# Patient Record
Sex: FEMALE | Race: WHITE | Employment: FULL TIME | URBAN - METROPOLITAN AREA
[De-identification: names, ages, dates, MRNs, and addresses within clinical notes are randomized per-mention and may not be internally consistent; named-entity substitution may affect disease eponyms.]

---

## 2017-02-09 ENCOUNTER — OFFICE VISIT (OUTPATIENT)
Dept: FAMILY MEDICINE CLINIC | Age: 57
End: 2017-02-09

## 2017-02-09 VITALS
SYSTOLIC BLOOD PRESSURE: 97 MMHG | OXYGEN SATURATION: 99 % | HEART RATE: 61 BPM | DIASTOLIC BLOOD PRESSURE: 53 MMHG | BODY MASS INDEX: 28.71 KG/M2 | RESPIRATION RATE: 16 BRPM | WEIGHT: 156 LBS | TEMPERATURE: 97.8 F | HEIGHT: 62 IN

## 2017-02-09 DIAGNOSIS — Z00.00 PHYSICAL EXAM, ANNUAL: Primary | ICD-10-CM

## 2017-02-09 DIAGNOSIS — Z11.59 SCREENING FOR VIRAL DISEASE: ICD-10-CM

## 2017-02-09 DIAGNOSIS — G89.29 CHRONIC HIP PAIN, UNSPECIFIED LATERALITY: ICD-10-CM

## 2017-02-09 DIAGNOSIS — M25.559 CHRONIC HIP PAIN, UNSPECIFIED LATERALITY: ICD-10-CM

## 2017-02-09 RX ORDER — MELOXICAM 15 MG/1
TABLET ORAL
Qty: 30 TAB | Refills: 2 | Status: SHIPPED | OUTPATIENT
Start: 2017-02-09 | End: 2018-01-17

## 2017-02-09 NOTE — PROGRESS NOTES
HISTORY OF PRESENT ILLNESS  Valentino Walls is a 61 y.o. female. HPI Comments: Pt comes in today for CPE. She says that she has been seeing Ortho for her chronic hip pain and has been getting injections. She says that she will see them again next week. She says that she has been seeing GI and will see them again next week. She denies fever, chills, sweats, N/V, chest pain, SOB, dizziness. Physical   Pertinent negatives include no chest pain, no abdominal pain, no headaches and no shortness of breath. Review of Systems   Constitutional: Negative for chills, diaphoresis, fever and malaise/fatigue. HENT: Negative for congestion, ear pain, hearing loss, nosebleeds and sore throat. Eyes: Negative for blurred vision, double vision, pain and redness. Respiratory: Negative for cough, hemoptysis, sputum production, shortness of breath and wheezing. Cardiovascular: Negative for chest pain, palpitations and leg swelling. Gastrointestinal: Negative for abdominal pain, blood in stool, constipation, diarrhea, nausea and vomiting. Genitourinary: Negative for dysuria and hematuria. Musculoskeletal: Positive for joint pain. Negative for back pain, myalgias and neck pain. Skin: Negative for rash. Neurological: Negative for dizziness, tingling, sensory change, seizures, loss of consciousness and headaches. Endo/Heme/Allergies: Does not bruise/bleed easily. Psychiatric/Behavioral: Negative for depression, memory loss and substance abuse. The patient is not nervous/anxious. History reviewed. No pertinent past medical history. History reviewed. No pertinent family history. History reviewed. No pertinent past surgical history. Social History     Social History    Marital status:      Spouse name: N/A    Number of children: N/A    Years of education: N/A     Occupational History    Not on file.      Social History Main Topics    Smoking status: Former Smoker     Years: 10. 00     Types: Cigarettes     Quit date: 11/19/1995    Smokeless tobacco: Never Used    Alcohol use Yes      Comment: occassionally    Drug use: No    Sexual activity: Yes     Partners: Male     Other Topics Concern    Not on file     Social History Narrative       Current Outpatient Prescriptions   Medication Sig Dispense Refill    meloxicam (MOBIC) 15 mg tablet take 1 tablet by mouth once daily with food 30 Tab 2    levothyroxine (SYNTHROID) 75 mcg tablet Take 1 Tab by mouth Daily (before breakfast). 90 Tab 1    simvastatin (ZOCOR) 10 mg tablet Take 1 Tab by mouth nightly. 90 Tab 1    CHOLECALCIFEROL, VITAMIN D3, (VITAMIN D3 PO) Take  by mouth.  omega-3 fatty acids (FISH OIL) cap 3,000 mg.      Glucosamine &Chondroit-MV-Min3 815-097-15-0.5 mg tab Take  by mouth. No Known Allergies    Visit Vitals    BP 97/53 (BP 1 Location: Right arm, BP Patient Position: Sitting)    Pulse 61    Temp 97.8 °F (36.6 °C) (Oral)    Resp 16    Ht 5' 2\" (1.575 m)    Wt 156 lb (70.8 kg)    SpO2 99%    BMI 28.53 kg/m2       Physical Exam   Constitutional: She is oriented to person, place, and time. She appears well-developed and well-nourished. No distress. HENT:   Head: Normocephalic and atraumatic. Right Ear: External ear normal.   Left Ear: External ear normal.   Nose: Nose normal.   Mouth/Throat: Oropharynx is clear and moist. No oropharyngeal exudate. Eyes: Conjunctivae are normal. Pupils are equal, round, and reactive to light. Right eye exhibits no discharge. Left eye exhibits no discharge. Neck: Normal range of motion. Neck supple. No tracheal deviation present. No thyromegaly present. Cardiovascular: Normal rate, regular rhythm and normal heart sounds. Exam reveals no gallop and no friction rub. No murmur heard. Pulmonary/Chest: Effort normal and breath sounds normal. No respiratory distress. She has no wheezes. She has no rales. Abdominal: Soft.  Bowel sounds are normal. She exhibits no distension and no mass. There is no tenderness. There is no rebound and no guarding. Musculoskeletal: Normal range of motion. She exhibits no edema or tenderness. Lymphadenopathy:     She has no cervical adenopathy. Neurological: She is alert and oriented to person, place, and time. Coordination normal.   Skin: Skin is warm and dry. No rash noted. She is not diaphoretic. No erythema. Psychiatric: She has a normal mood and affect. Her behavior is normal. Judgment and thought content normal.       ASSESSMENT and PLAN  1. Physical exam, annual  - VITAMIN D, 25 HYDROXY; Future  - HEMOGLOBIN A1C WITH EAG; Future  - other blood work done in Dec 2016 and was normal    2. Chronic hip pain, unspecified laterality  - meloxicam (MOBIC) 15 mg tablet; take 1 tablet by mouth once daily with food  Dispense: 30 Tab; Refill: 2    3. Screening for viral disease  - HEPATITIS C AB; Future    - will call with results, follow up as needed  - pt up to date on health maintenance    Plan and reference materials were reviewed with the patient and the patient expressed understanding. Patient instructed that if symptoms/condition worsens or fails to resolve to come back to the office or go to the emergency room.     Danilo Quezada

## 2017-02-09 NOTE — PATIENT INSTRUCTIONS
Well Visit, Women 48 to 72: Care Instructions  Your Care Instructions  Physical exams can help you stay healthy. Your doctor has checked your overall health and may have suggested ways to take good care of yourself. He or she also may have recommended tests. At home, you can help prevent illness with healthy eating, regular exercise, and other steps. Follow-up care is a key part of your treatment and safety. Be sure to make and go to all appointments, and call your doctor if you are having problems. It's also a good idea to know your test results and keep a list of the medicines you take. How can you care for yourself at home? · Reach and stay at a healthy weight. This will lower your risk for many problems, such as obesity, diabetes, heart disease, and high blood pressure. · Get at least 30 minutes of exercise on most days of the week. Walking is a good choice. You also may want to do other activities, such as running, swimming, cycling, or playing tennis or team sports. · Do not smoke. Smoking can make health problems worse. If you need help quitting, talk to your doctor about stop-smoking programs and medicines. These can increase your chances of quitting for good. · Protect your skin from too much sun. When you're outdoors from 10 a.m. to 4 p.m., stay in the shade or cover up with clothing and a hat with a wide brim. Wear sunglasses that block UV rays. Even when it's cloudy, put broad-spectrum sunscreen (SPF 30 or higher) on any exposed skin. · See a dentist one or two times a year for checkups and to have your teeth cleaned. · Wear a seat belt in the car. · Limit alcohol to 1 drink a day. Too much alcohol can cause health problems. Follow your doctor's advice about when to have certain tests. These tests can spot problems early. · Cholesterol.  Your doctor will tell you how often to have this done based on your age, family history, or other things that can increase your risk for heart attack and stroke. · Blood pressure. Have your blood pressure checked during a routine doctor visit. Your doctor will tell you how often to check your blood pressure based on your age, your blood pressure results, and other factors. · Mammogram. Ask your doctor how often you should have a mammogram, which is an X-ray of your breasts. A mammogram can spot breast cancer before it can be felt and when it is easiest to treat. · Pap test and pelvic exam. Ask your doctor how often you should have a Pap test. You may not need to have a Pap test as often as you used to. · Vision. Have your eyes checked every year or two or as often as your doctor suggests. Some experts recommend that you have yearly exams for glaucoma and other age-related eye problems starting at age 48. · Hearing. Tell your doctor if you notice any change in your hearing. You can have tests to find out how well you hear. · Diabetes. Ask your doctor whether you should have tests for diabetes. · Colon cancer. You should begin tests for colon cancer at age 48. You may have one of several tests. Your doctor will tell you how often to have tests based on your age and risk. Risks include whether you already had a precancerous polyp removed from your colon or whether your parents, sisters and brothers, or children have had colon cancer. · Thyroid disease. Talk to your doctor about whether to have your thyroid checked as part of a regular physical exam. Women have an increased chance of a thyroid problem. · Osteoporosis. You should begin tests for bone density at age 72. If you are younger than 72, ask your doctor whether you have factors that may increase your risk for this disease. You may want to have this test before age 72. · Heart attack and stroke risk. At least every 4 to 6 years, you should have your risk for heart attack and stroke assessed.  Your doctor uses factors such as your age, blood pressure, cholesterol, and whether you smoke or have diabetes to show what your risk for a heart attack or stroke is over the next 10 years. When should you call for help? Watch closely for changes in your health, and be sure to contact your doctor if you have any problems or symptoms that concern you. Where can you learn more? Go to http://george-rajiv.info/. Enter D206 in the search box to learn more about \"Well Visit, Women 50 to 72: Care Instructions. \"  Current as of: July 19, 2016  Content Version: 11.1  © 6076-2984 [employer]. Care instructions adapted under license by [employer] (which disclaims liability or warranty for this information). If you have questions about a medical condition or this instruction, always ask your healthcare professional. [address] any warranty or liability for your use of this information.

## 2017-02-09 NOTE — MR AVS SNAPSHOT
Visit Information Date & Time Provider Department Dept. Phone Encounter #  
 2/9/2017  3:00 PM Reed Hooker, [address] [facility] [phone] [id number] Follow-up Instructions Return if symptoms worsen or fail to improve. Upcoming Health Maintenance Date Due DTaP/Tdap/Td series (1 - Tdap) 2/23/1980 PAP AKA CERVICAL CYTOLOGY 8/18/2018 BREAST CANCER SCRN MAMMOGRAM 12/14/2018 COLONOSCOPY 6/1/2019 Allergies as of 2/9/2017  Review Complete On: 2/9/2017 By: NAOMI Salmeron No Known Allergies Current Immunizations  Reviewed on 12/12/2016 Name Date Influenza Vaccine 11/20/2014 Influenza Vaccine (Quad) PF 12/12/2016 Zoster Vaccine, Live 11/20/2014 Not reviewed this visit You Were Diagnosed With   
  
 Codes Comments Physical exam, annual    -  Primary ICD-10-CM: Z00.00 ICD-9-CM: V70.0 Chronic hip pain, unspecified laterality     ICD-10-CM: M25.559, G89.29 ICD-9-CM: 719.45, 338.29 Vitals BP Pulse Temp Resp Height(growth percentile) Weight(growth percentile) 97/53 (BP 1 Location: Right arm, BP Patient Position: Sitting) 61 97.8 °F (36.6 °C) (Oral) 16 5' 2\" (1.575 m) 156 lb (70.8 kg) SpO2 BMI OB Status Smoking Status 99% 28.53 kg/m2 Postmenopausal Former Smoker BMI and BSA Data Body Mass Index Body Surface Area 28.53 kg/m 2 1.76 m 2 Preferred Pharmacy Pharmacy Name Phone [address] [phone] Your Updated Medication List  
  
   
This list is accurate as of: 2/9/17  4:02 PM.  Always use your most recent med list.  
  
  
  
  
 Payal Lime Generic drug:  omega-3 fatty acids 3,000 mg.  
  
 Glucosamine &Chondroit-MV-Min3 572-438-49-0.5 mg Tab Take  by mouth.  
  
 levothyroxine 75 mcg tablet Commonly known as:  SYNTHROID Take 1 Tab by mouth Daily (before breakfast). meloxicam 15 mg tablet Commonly known as:  MOBIC  
take 1 tablet by mouth once daily with food  
  
 simvastatin 10 mg tablet Commonly known as:  ZOCOR Take 1 Tab by mouth nightly. VITAMIN D3 PO Take  by mouth. Prescriptions Sent to Pharmacy Refills  
 meloxicam (MOBIC) 15 mg tablet 2 Sig: take 1 tablet by mouth once daily with food Class: Normal  
 Pharmacy: [address] [place] [address] #: [phone] Follow-up Instructions Return if symptoms worsen or fail to improve. To-Do List   
 02/09/2017 Lab:  HEMOGLOBIN A1C WITH EAG   
  
 02/09/2017 Lab:  VITAMIN D, 25 HYDROXY Patient Instructions Well Visit, Women 48 to 72: Care Instructions Your Care Instructions Physical exams can help you stay healthy. Your doctor has checked your overall health and may have suggested ways to take good care of yourself. He or she also may have recommended tests. At home, you can help prevent illness with healthy eating, regular exercise, and other steps. Follow-up care is a key part of your treatment and safety. Be sure to make and go to all appointments, and call your doctor if you are having problems. It's also a good idea to know your test results and keep a list of the medicines you take. How can you care for yourself at home? · Reach and stay at a healthy weight. This will lower your risk for many problems, such as obesity, diabetes, heart disease, and high blood pressure. · Get at least 30 minutes of exercise on most days of the week. Walking is a good choice. You also may want to do other activities, such as running, swimming, cycling, or playing tennis or team sports. · Do not smoke. Smoking can make health problems worse. If you need help quitting, talk to your doctor about stop-smoking programs and medicines. These can increase your chances of quitting for good. · Protect your skin from too much sun. When you're outdoors from 10 a.m. to 4 p.m., stay in the shade or cover up with clothing and a hat with a wide brim. Wear sunglasses that block UV rays. Even when it's cloudy, put broad-spectrum sunscreen (SPF 30 or higher) on any exposed skin. · See a dentist one or two times a year for checkups and to have your teeth cleaned. · Wear a seat belt in the car. · Limit alcohol to 1 drink a day. Too much alcohol can cause health problems. Follow your doctor's advice about when to have certain tests. These tests can spot problems early. · Cholesterol. Your doctor will tell you how often to have this done based on your age, family history, or other things that can increase your risk for heart attack and stroke. · Blood pressure. Have your blood pressure checked during a routine doctor visit. Your doctor will tell you how often to check your blood pressure based on your age, your blood pressure results, and other factors. · Mammogram. Ask your doctor how often you should have a mammogram, which is an X-ray of your breasts. A mammogram can spot breast cancer before it can be felt and when it is easiest to treat. · Pap test and pelvic exam. Ask your doctor how often you should have a Pap test. You may not need to have a Pap test as often as you used to. · Vision. Have your eyes checked every year or two or as often as your doctor suggests. Some experts recommend that you have yearly exams for glaucoma and other age-related eye problems starting at age 48. · Hearing. Tell your doctor if you notice any change in your hearing. You can have tests to find out how well you hear. · Diabetes. Ask your doctor whether you should have tests for diabetes. · Colon cancer. You should begin tests for colon cancer at age 48. You may have one of several tests. Your doctor will tell you how often to have tests based on your age and risk.  Risks include whether you already had a precancerous polyp removed from your colon or whether your parents, sisters and brothers, or children have had colon cancer. · Thyroid disease. Talk to your doctor about whether to have your thyroid checked as part of a regular physical exam. Women have an increased chance of a thyroid problem. · Osteoporosis. You should begin tests for bone density at age 72. If you are younger than 72, ask your doctor whether you have factors that may increase your risk for this disease. You may want to have this test before age 72. · Heart attack and stroke risk. At least every 4 to 6 years, you should have your risk for heart attack and stroke assessed. Your doctor uses factors such as your age, blood pressure, cholesterol, and whether you smoke or have diabetes to show what your risk for a heart attack or stroke is over the next 10 years. When should you call for help? Watch closely for changes in your health, and be sure to contact your doctor if you have any problems or symptoms that concern you. Where can you learn more? Go to http://george-rajiv.info/. Enter G547 in the search box to learn more about \"Well Visit, Women 50 to 72: Care Instructions. \" Current as of: July 19, 2016 Content Version: 11.1 © 1734-7329 [employer]. Care instructions adapted under license by [email or web address] (which disclaims liability or warranty for this information). If you have questions about a medical condition or this instruction, always ask your healthcare professional. [place] [address] any warranty or liability for your use of this information. Introducing [facility]! Dear Kaveh Mahmood: Thank you for requesting a [employer] account. Our records indicate that you have previously registered for a [employer] account but its currently inactive. Please call our [employer] support line at [phone]. Additional Information If you have questions, please visit the Frequently Asked Questions section of the [facility] website at [email or web address] Remember, [facility] is NOT to be used for urgent needs. For medical emergencies, dial 911. Now available from your iPhone and Android! Please provide this summary of care documentation to your next provider. Your primary care clinician is listed as Bakari Augustin. If you have any questions after today's visit, please call [phone].

## 2017-02-09 NOTE — PROGRESS NOTES
Austyn Rowan is a 61 y.o. female presents to office for physical      1. Have you been to the ER, urgent care clinic or hospitalized since your last visit? no  2. Have you seen any other providers outside of [facility] since your last visit? yes  3. Have you had a Flu shot this year?  yes       Health Maintenance items with a due date reviewed with patient:  Health Maintenance Due   Topic Date Due    DTaP/Tdap/Td series (1 - Tdap) 02/23/1980

## 2017-02-10 ENCOUNTER — TELEPHONE (OUTPATIENT)
Dept: FAMILY MEDICINE CLINIC | Age: 57
End: 2017-02-10

## 2017-02-10 LAB
25(OH)D3 SERPL-MCNC: 56 NG/ML (ref 32–100)
AVG GLU, 10930: 117 MG/DL (ref 91–123)
HBA1C MFR BLD HPLC: 5.7 % (ref 4.8–5.9)
HCV AB SER IA-ACNC: NORMAL

## 2017-02-10 NOTE — TELEPHONE ENCOUNTER
----- [facility] at 2/10/2017  1:29 PM EST -----  Please call pt and let her know that her labs are normal. Thanks!

## 2017-03-20 ENCOUNTER — TELEPHONE (OUTPATIENT)
Dept: FAMILY MEDICINE CLINIC | Age: 57
End: 2017-03-20

## 2017-03-20 ENCOUNTER — OFFICE VISIT (OUTPATIENT)
Dept: FAMILY MEDICINE CLINIC | Age: 57
End: 2017-03-20

## 2017-03-20 VITALS
TEMPERATURE: 96.9 F | WEIGHT: 153 LBS | SYSTOLIC BLOOD PRESSURE: 121 MMHG | BODY MASS INDEX: 28.16 KG/M2 | RESPIRATION RATE: 18 BRPM | DIASTOLIC BLOOD PRESSURE: 75 MMHG | HEIGHT: 62 IN | HEART RATE: 63 BPM | OXYGEN SATURATION: 98 %

## 2017-03-20 DIAGNOSIS — H11.31 SUBCONJUNCTIVAL HEMORRHAGE OF RIGHT EYE: Primary | ICD-10-CM

## 2017-03-20 NOTE — TELEPHONE ENCOUNTER
Reporting corneal bleed 2 weeks ago, most recent was yesterday same eye R eye. No pain and no change in vision. Per Dr. Hilaria Miranda, pt can be seen in the office to reassure her that there is nothing else going on in her eye or go straight to optometry and be see. If pt is coming in the office and the provider see something that's needs further evaluation then pt will be sent to an eye doctor anyway. Pt wanted to be seen in the office. She is scheduled with provider Florencio at 3:30.

## 2017-03-20 NOTE — MR AVS SNAPSHOT
Visit Information Date & Time Provider Department Dept. Phone Encounter #  
 3/20/2017  3:30 PM Joaquin Silver NP [address] [phone] [id number] Follow-up Instructions Return if symptoms worsen or fail to improve. Upcoming Health Maintenance Date Due DTaP/Tdap/Td series (1 - Tdap) 2/23/1980 PAP AKA CERVICAL CYTOLOGY 8/18/2018 BREAST CANCER SCRN MAMMOGRAM 12/14/2018 COLONOSCOPY 6/1/2019 Allergies as of 3/20/2017  Review Complete On: 3/20/2017 By: Joaquin Silver NP No Known Allergies Current Immunizations  Reviewed on 12/12/2016 Name Date Influenza Vaccine 11/20/2014 Influenza Vaccine (Quad) PF 12/12/2016 Zoster Vaccine, Live 11/20/2014 Not reviewed this visit You Were Diagnosed With   
  
 Codes Comments Subconjunctival hemorrhage of right eye    -  Primary ICD-10-CM: H11.31 
ICD-9-CM: 372.72 Vitals BP Pulse Temp Resp Height(growth percentile) Weight(growth percentile) 121/75 63 96.9 °F (36.1 °C) (Oral) 18 5' 2.01\" (1.575 m) 153 lb (69.4 kg) SpO2 BMI OB Status Smoking Status 98% 27.98 kg/m2 Postmenopausal Former Smoker Vitals History BMI and BSA Data Body Mass Index Body Surface Area  
 27.98 kg/m 2 1.74 m 2 Preferred Pharmacy Pharmacy Name Phone [address] [phone] Your Updated Medication List  
  
   
This list is accurate as of: 3/20/17  4:15 PM.  Always use your most recent med list.  
  
  
  
  
 ALIGN 4 mg Cap Generic drug:  Bifidobacterium Infantis Take  by mouth. FISH OIL Cap Generic drug:  omega-3 fatty acids 3,000 mg.  
  
 Glucosamine &Chondroit-MV-Min3 480-281-01-0.5 mg Tab Take  by mouth.  
  
 levothyroxine 75 mcg tablet Commonly known as:  SYNTHROID Take 1 Tab by mouth Daily (before breakfast). meloxicam 15 mg tablet Commonly known as:  MOBIC  
take 1 tablet by mouth once daily with food  
  
 simvastatin 10 mg tablet Commonly known as:  ZOCOR Take 1 Tab by mouth nightly. VITAMIN D3 PO Take  by mouth. Follow-up Instructions Return if symptoms worsen or fail to improve. Patient Instructions Subconjunctival Hemorrhage: Care Instructions Your Care Instructions Sometimes small blood vessels in the white of the eye can break, causing a red spot or speck. This is called a subconjunctival hemorrhage. The blood vessels may break when you sneeze, cough, vomit, strain, or bend over. Sometimes there is no clear cause. The blood may look alarming, especially if the spot is large. If there is no pain or vision change, there is usually no reason to worry, and the blood slowly will go away on its own in 2 to 3 weeks. Follow-up care is a key part of your treatment and safety. Be sure to make and go to all appointments, and call your doctor if you are having problems. Its also a good idea to know your test results and keep a list of the medicines you take. How can you care for yourself at home? · Watch for changes in your eye. It is normal for the red spot on your eyeball to change color as it heals. Just like a bruise on your skin, it may change from red to brown to purple to yellow. · Do not take aspirin or products that contain aspirin, which can increase bleeding. Use acetaminophen (Tylenol) if you need pain relief for another problem. · Do not take two or more pain medicines at the same time unless the doctor told you to. Many pain medicines have acetaminophen, which is Tylenol. Too much acetaminophen (Tylenol) can be harmful. When should you call for help? Call your doctor now or seek immediate medical care if: 
· You see blood over the black part of your eye (pupil). · You have any changes or problems in your vision. · You have any pain in your eye. · You have any discharge from your eye. Watch closely for changes in your health, and be sure to contact your doctor if: 
· Your eye color is not steadily returning to normal. 
· The blood has not gone away after 2 to 3 weeks. · You develop bruising or bleeding elsewhere, such as the gums or the skin, or you have nosebleeds. Where can you learn more? Go to http://george-rajiv.info/. Enter K600 in the search box to learn more about \"Subconjunctival Hemorrhage: Care Instructions. \" Current as of: May 23, 2016 Content Version: 11.1 © 9814-6047 [employer]. Care instructions adapted under license by [employer] (which disclaims liability or warranty for this information). If you have questions about a medical condition or this instruction, always ask your healthcare professional. [address] any warranty or liability for your use of this information. Introducing [facility]! Dear Berlin Guzman: Thank you for requesting a [facility] account. Our records indicate that you have previously registered for a [facility] account but its currently inactive. Please call our [facility] support line at [phone]. Additional Information If you have questions, please visit the Frequently Asked Questions section of the [facility] website at [email or web address] Remember, [facility] is NOT to be used for urgent needs. For medical emergencies, dial 911. Now available from your iPhone and Android! Please provide this summary of care documentation to your next provider. Your primary care clinician is listed as Luis Miles. If you have any questions after today's visit, please call [phone].

## 2017-03-20 NOTE — PATIENT INSTRUCTIONS
Subconjunctival Hemorrhage: Care Instructions  Your Care Instructions    Sometimes small blood vessels in the white of the eye can break, causing a red spot or speck. This is called a subconjunctival hemorrhage. The blood vessels may break when you sneeze, cough, vomit, strain, or bend over. Sometimes there is no clear cause. The blood may look alarming, especially if the spot is large. If there is no pain or vision change, there is usually no reason to worry, and the blood slowly will go away on its own in 2 to 3 weeks. Follow-up care is a key part of your treatment and safety. Be sure to make and go to all appointments, and call your doctor if you are having problems. Its also a good idea to know your test results and keep a list of the medicines you take. How can you care for yourself at home? · Watch for changes in your eye. It is normal for the red spot on your eyeball to change color as it heals. Just like a bruise on your skin, it may change from red to brown to purple to yellow. · Do not take aspirin or products that contain aspirin, which can increase bleeding. Use acetaminophen (Tylenol) if you need pain relief for another problem. · Do not take two or more pain medicines at the same time unless the doctor told you to. Many pain medicines have acetaminophen, which is Tylenol. Too much acetaminophen (Tylenol) can be harmful. When should you call for help? Call your doctor now or seek immediate medical care if:  · You see blood over the black part of your eye (pupil). · You have any changes or problems in your vision. · You have any pain in your eye. · You have any discharge from your eye. Watch closely for changes in your health, and be sure to contact your doctor if:  · Your eye color is not steadily returning to normal.  · The blood has not gone away after 2 to 3 weeks. · You develop bruising or bleeding elsewhere, such as the gums or the skin, or you have nosebleeds.   Where can you learn more? Go to http://george-rajiv.info/. Enter F606 in the search box to learn more about \"Subconjunctival Hemorrhage: Care Instructions. \"  Current as of: May 23, 2016  Content Version: 11.1  © 6090-7961 [employer]. Care instructions adapted under license by [employer] (which disclaims liability or warranty for this information). If you have questions about a medical condition or this instruction, always ask your healthcare professional. [address] any warranty or liability for your use of this information.

## 2017-03-20 NOTE — PROGRESS NOTES
1. Have you been to the ER, urgent care clinic since your last visit? Hospitalized since your last visit?no    2. Have you seen or consulted any other health care providers outside of the [address] [place] [address] since your last visit? Include any pap smears or colon screening. No    Patient Dea Alpers is a 61 y.o. female presents today for red eye (right).

## 2017-03-20 NOTE — PROGRESS NOTES
Nathalia Crystal is a 61 y.o.  female and presents with    Chief Complaint   Patient presents with    Red Eye       Subjective:  Ms. America Bowman presents today with complaints of redness that appeared more like bleeding to the right eye approximately 2 weeks ago. The redness went away and then yesterday she noticed the redness started again. She denies itching and tearing to the right eye. She denies light sensitivity. She states when the redness started in her eye she felt pressure. The \"redness\" started in the corner of her eye. She states she has not been coughing or lifting anything heavy. She does not have history of hypertension. Additional Concerns: No         Patient Active Problem List   Diagnosis Code    Hyperlipidemia E78.5    Hypothyroidism E03.9    Vitamin D deficiency E55.9    Vitamin B12 deficiency E53.8    Insomnia G47.00     Current Outpatient Prescriptions   Medication Sig Dispense Refill    Bifidobacterium Infantis (ALIGN) 4 mg cap Take  by mouth.  meloxicam (MOBIC) 15 mg tablet take 1 tablet by mouth once daily with food 30 Tab 2    levothyroxine (SYNTHROID) 75 mcg tablet Take 1 Tab by mouth Daily (before breakfast). 90 Tab 1    simvastatin (ZOCOR) 10 mg tablet Take 1 Tab by mouth nightly. 90 Tab 1    CHOLECALCIFEROL, VITAMIN D3, (VITAMIN D3 PO) Take  by mouth.  omega-3 fatty acids (FISH OIL) cap 3,000 mg.      Glucosamine &Chondroit-MV-Min3 225-309-13-0.5 mg tab Take  by mouth. No Known Allergies  History reviewed. No pertinent past medical history. History reviewed. No pertinent surgical history. History reviewed. No pertinent family history.   Social History   Substance Use Topics    Smoking status: Former Smoker     Years: 10.00     Types: Cigarettes     Quit date: 11/19/1995    Smokeless tobacco: Never Used    Alcohol use Yes      Comment: occassionally       ROS   History obtained from the patient  General ROS: negative for - chills or fever  Ophthalmic ROS: negative for - blurry vision, decreased vision, double vision, excessive tearing, eye pain, loss of vision or photophobia  ENT ROS: positive for - right ear pressure    All other systems reviewed and are negative. Objective:  Vitals:    03/20/17 1555   BP: 121/75   Pulse: 63   Resp: 18   Temp: 96.9 °F (36.1 °C)   TempSrc: Oral   SpO2: 98%   Weight: 153 lb (69.4 kg)   Height: 5' 2.01\" (1.575 m)   PainSc:   0 - No pain       General appearance - alert, well appearing, and in no distress  Eyes - pupils equal and reactive, extraocular eye movements intact, focal flat red region to ocular surface of right eye  Ears - left ear with cerumen impaction; right TM normal        Assessment/Plan:    1. Subconjunctival Hemorrhage- discussed with patient common causes; advised to see Optometrist- patient to make appointment for tomorrow; advised to hold mobic until cleared by optometry as mobic can increase bleeding; discussed signs and symptoms to watch out for which include pain in the eye and loss of vision; she verbalized understanding and has no additional questions or concerns at this time. Lab review: no lab studies available for review at time of visit    Today's Visit: Reassurance; hold Mobic; make appointment with Optometrist    Health Maintenance:     I have discussed the diagnosis with the patient and the intended plan as seen in the above orders. The patient has received an after-visit summary and questions were answered concerning future plans. I have discussed medication side effects and warnings with the patient as well. I have reviewed the plan of care with the patient, accepted their input and they are in agreement with the treatment goals. Follow-up Disposition:  Return if symptoms worsen or fail to improve. More than 1/2 of this 15 minute visit was spent in counseling and coordination of care, as described above.     CHRISTA Cooper

## 2017-04-26 ENCOUNTER — TELEPHONE (OUTPATIENT)
Dept: FAMILY MEDICINE CLINIC | Age: 57
End: 2017-04-26

## 2017-04-26 DIAGNOSIS — R92.8 ABNORMAL MAMMOGRAM: Primary | ICD-10-CM

## 2017-04-26 NOTE — TELEPHONE ENCOUNTER
Pt requesting order for a mammogram be put in .  Pt would like to go to [facility]

## 2017-06-14 ENCOUNTER — TELEPHONE (OUTPATIENT)
Dept: FAMILY MEDICINE CLINIC | Age: 57
End: 2017-06-14

## 2017-06-14 DIAGNOSIS — R92.8 ABNORMAL MAMMOGRAM: Primary | ICD-10-CM

## 2017-06-14 NOTE — TELEPHONE ENCOUNTER
Rec'vd call from [facility] requesting an order for a diagnostic f/u exam. Pt has an appt sheri for 6/15 .  Please fax order to [phone]

## 2017-07-06 DIAGNOSIS — E03.9 HYPOTHYROIDISM (ACQUIRED): ICD-10-CM

## 2017-07-06 RX ORDER — LEVOTHYROXINE SODIUM 75 UG/1
75 TABLET ORAL
Qty: 90 TAB | Refills: 0 | Status: SHIPPED | OUTPATIENT
Start: 2017-07-06 | End: 2017-10-05 | Stop reason: SDUPTHER

## 2017-07-06 NOTE — TELEPHONE ENCOUNTER
Pt calling to request medication refill of:    Requested Prescriptions     Pending Prescriptions Disp Refills    levothyroxine (SYNTHROID) 75 mcg tablet 90 Tab 1     Sig: Take 1 Tab by mouth Daily (before breakfast). be sent to [facility]. Pt has about 2 tabs remaining. Pts last appt was 03/20/17. Advised pt of 72 hour time frame for refill requests. Please advise.

## 2017-07-26 DIAGNOSIS — R92.8 ABNORMAL MAMMOGRAM: ICD-10-CM

## 2017-08-03 DIAGNOSIS — E78.5 HYPERLIPIDEMIA, UNSPECIFIED HYPERLIPIDEMIA TYPE: ICD-10-CM

## 2017-08-07 RX ORDER — SIMVASTATIN 10 MG/1
10 TABLET, FILM COATED ORAL
Qty: 30 TAB | Refills: 0 | Status: SHIPPED | OUTPATIENT
Start: 2017-08-07 | End: 2017-10-05 | Stop reason: SDUPTHER

## 2017-09-22 ENCOUNTER — OFFICE VISIT (OUTPATIENT)
Dept: FAMILY MEDICINE CLINIC | Age: 57
End: 2017-09-22

## 2017-09-22 VITALS
WEIGHT: 144 LBS | OXYGEN SATURATION: 98 % | HEIGHT: 62 IN | SYSTOLIC BLOOD PRESSURE: 117 MMHG | BODY MASS INDEX: 26.5 KG/M2 | DIASTOLIC BLOOD PRESSURE: 74 MMHG | RESPIRATION RATE: 16 BRPM | HEART RATE: 67 BPM | TEMPERATURE: 98.3 F

## 2017-09-22 DIAGNOSIS — Z23 ENCOUNTER FOR IMMUNIZATION: ICD-10-CM

## 2017-09-22 DIAGNOSIS — E78.2 MIXED HYPERLIPIDEMIA: ICD-10-CM

## 2017-09-22 DIAGNOSIS — E03.9 ACQUIRED HYPOTHYROIDISM: Primary | ICD-10-CM

## 2017-09-22 DIAGNOSIS — E55.9 VITAMIN D DEFICIENCY: ICD-10-CM

## 2017-09-22 NOTE — PATIENT INSTRUCTIONS
High Cholesterol: Care Instructions  Your Care Instructions  Cholesterol is a type of fat in your blood. It is needed for many body functions, such as making new cells. Cholesterol is made by your body. It also comes from food you eat. High cholesterol means that you have too much of the fat in your blood. This raises your risk of a heart attack and stroke. LDL and HDL are part of your total cholesterol. LDL is the \"bad\" cholesterol. High LDL can raise your risk for heart disease, heart attack, and stroke. HDL is the \"good\" cholesterol. It helps clear bad cholesterol from the body. High HDL is linked with a lower risk of heart disease, heart attack, and stroke. Your cholesterol levels help your doctor find out your risk for having a heart attack or stroke. You and your doctor can talk about whether you need to lower your risk and what treatment is best for you. A heart-healthy lifestyle along with medicines can help lower your cholesterol and your risk. The way you choose to lower your risk will depend on how high your risk is for heart attack and stroke. It will also depend on how you feel about taking medicines. Follow-up care is a key part of your treatment and safety. Be sure to make and go to all appointments, and call your doctor if you are having problems. It's also a good idea to know your test results and keep a list of the medicines you take. How can you care for yourself at home? · Eat a variety of foods every day. Good choices include fruits, vegetables, whole grains (like oatmeal), dried beans and peas, nuts and seeds, soy products (like tofu), and fat-free or low-fat dairy products. · Replace butter, margarine, and hydrogenated or partially hydrogenated oils with olive and canola oils. (Canola oil margarine without trans fat is fine.)  · Replace red meat with fish, poultry, and soy protein (like tofu). · Limit processed and packaged foods like chips, crackers, and cookies.   · Bake, broil, or steam foods. Don't reed them. · Be physically active. Get at least 30 minutes of exercise on most days of the week. Walking is a good choice. You also may want to do other activities, such as running, swimming, cycling, or playing tennis or team sports. · Stay at a healthy weight or lose weight by making the changes in eating and physical activity listed above. Losing just a small amount of weight, even 5 to 10 pounds, can reduce your risk for having a heart attack or stroke. · Do not smoke. When should you call for help? Watch closely for changes in your health, and be sure to contact your doctor if:  · You need help making lifestyle changes. · You have questions about your medicine. Where can you learn more? Go to http://george-rajiv.info/. Enter Z201 in the search box to learn more about \"High Cholesterol: Care Instructions. \"  Current as of: April 3, 2017  Content Version: 11.3  © 7510-5498 [employer]. Care instructions adapted under license by [employer] (which disclaims liability or warranty for this information). If you have questions about a medical condition or this instruction, always ask your healthcare professional. [address] any warranty or liability for your use of this information. Hypothyroidism: Care Instructions  Your Care Instructions  You have hypothyroidism, which means that your body is not making enough thyroid hormone. This hormone helps your body use energy. If your thyroid level is low, you may feel tired, be constipated, have an increase in your blood pressure, or have dry skin or memory problems. You may also get cold easily, even when it is warm. Women with low thyroid levels may have heavy menstrual periods. A blood test to find your thyroid-stimulating hormone (TSH) level is used to check for hypothyroidism. A high TSH level may mean that you have low thyroid.  When your body is not making enough thyroid hormone, TSH levels rise in an effort to make the body produce more. The treatment for hypothyroidism is to take thyroid hormone pills. You should start to feel better in 1 to 2 weeks. But it can take several months to see changes in the TSH level. You will need regular visits with your doctor to make sure you have the right dose of medicine. Most people need treatment for the rest of their lives. You will need to see your doctor regularly to have blood tests and to make sure you are doing well. Follow-up care is a key part of your treatment and safety. Be sure to make and go to all appointments, and call your doctor if you are having problems. Its also a good idea to know your test results and keep a list of the medicines you take. How can you care for yourself at home? · Take your thyroid hormone medicine exactly as prescribed. Call your doctor if you think you are having a problem with your medicine. Most people do not have side effects if they take the right amount of medicine regularly. ¨ Take the medicine 30 minutes before breakfast, and do not take it with calcium, vitamins, or iron. ¨ Do not take extra doses of your thyroid medicine. It will not help you get better any faster, and it may cause side effects. ¨ If you forget to take a dose, do NOT take a double dose of medicine. Take your usual dose the next day. · Tell your doctor about all prescription, herbal, or over-the-counter products you take. · Take care of yourself. Eat a healthy diet, get enough sleep, and get regular exercise. When should you call for help? Call 911 anytime you think you may need emergency care. For example, call if:  · You passed out (lost consciousness). · You have severe trouble breathing. · You have a very slow heartbeat (less than 60 beats a minute). · You have a low body temperature (95°F or below). Call your doctor now or seek immediate medical care if:  · You feel tired, sluggish, or weak.   · You have trouble remembering things or concentrating. · You do not begin to feel better 2 weeks after starting your medicine. Watch closely for changes in your health, and be sure to contact your doctor if you have any problems. Where can you learn more? Go to http://george-rajiv.info/. Enter Z148 in the search box to learn more about \"Hypothyroidism: Care Instructions. \"  Current as of: July 28, 2016  Content Version: 11.3  © 9114-2938 [employer]. Care instructions adapted under license by [employer] (which disclaims liability or warranty for this information). If you have questions about a medical condition or this instruction, always ask your healthcare professional. [address] any warranty or liability for your use of this information.

## 2017-09-22 NOTE — MR AVS SNAPSHOT
Visit Information Date & Time Provider Department Dept. Phone Encounter #  
 9/22/2017  3:00 PM [place] Louise, [address] [facility] [phone] [id number] Upcoming Health Maintenance Date Due DTaP/Tdap/Td series (1 - Tdap) 2/23/1980 INFLUENZA AGE 9 TO ADULT 8/1/2017 PAP AKA CERVICAL CYTOLOGY 8/18/2018 BREAST CANCER SCRN MAMMOGRAM 6/20/2019 COLONOSCOPY 4/12/2027 Allergies as of 9/22/2017  Review Complete On: 9/22/2017 By: NAOMI Villalobos No Known Allergies Current Immunizations  Reviewed on 12/12/2016 Name Date Influenza Vaccine 11/20/2014 Influenza Vaccine (Quad) PF 12/12/2016 Zoster Vaccine, Live 11/20/2014 Not reviewed this visit You Were Diagnosed With   
  
 Codes Comments Acquired hypothyroidism    -  Primary ICD-10-CM: E03.9 ICD-9-CM: 244.9 Mixed hyperlipidemia     ICD-10-CM: E78.2 ICD-9-CM: 272.2 Vitamin D deficiency     ICD-10-CM: E55.9 ICD-9-CM: 268.9 Vitals BP Pulse Temp Resp Height(growth percentile) Weight(growth percentile) 117/74 67 98.3 °F (36.8 °C) (Oral) 16 5' 2.01\" (1.575 m) 144 lb (65.3 kg) SpO2 BMI OB Status Smoking Status 98% 26.33 kg/m2 Postmenopausal Former Smoker Vitals History BMI and BSA Data Body Mass Index Body Surface Area  
 26.33 kg/m 2 1.69 m 2 Preferred Pharmacy Pharmacy Name Phone [address] [phone] Your Updated Medication List  
  
   
This list is accurate as of: 9/22/17  3:37 PM.  Always use your most recent med list.  
  
  
  
  
 ALIGN 4 mg Cap Generic drug:  Bifidobacterium Infantis Take  by mouth. FISH OIL Cap Generic drug:  omega-3 fatty acids 3,000 mg.  
  
 Glucosamine &Chondroit-MV-Min3 177-604-24-0.5 mg Tab Take  by mouth.  
  
 levothyroxine 75 mcg tablet Commonly known as:  SYNTHROID  
 Take 1 Tab by mouth Daily (before breakfast). meloxicam 15 mg tablet Commonly known as:  MOBIC  
take 1 tablet by mouth once daily with food  
  
 simvastatin 10 mg tablet Commonly known as:  ZOCOR Take 1 Tab by mouth nightly. Pt needs to schedule follow up appointment for more refills. VITAMIN D3 PO Take  by mouth. To-Do List   
 09/22/2017 Lab:  LIPID PANEL   
  
 09/22/2017 Lab:  METABOLIC PANEL, COMPREHENSIVE   
  
 09/22/2017 Lab:  TSH 3RD GENERATION   
  
 09/22/2017 Lab:  VITAMIN B12   
  
 09/22/2017 Lab:  VITAMIN D, 1, 25 DIHYDROXY Patient Instructions High Cholesterol: Care Instructions Your Care Instructions Cholesterol is a type of fat in your blood. It is needed for many body functions, such as making new cells. Cholesterol is made by your body. It also comes from food you eat. High cholesterol means that you have too much of the fat in your blood. This raises your risk of a heart attack and stroke. LDL and HDL are part of your total cholesterol. LDL is the \"bad\" cholesterol. High LDL can raise your risk for heart disease, heart attack, and stroke. HDL is the \"good\" cholesterol. It helps clear bad cholesterol from the body. High HDL is linked with a lower risk of heart disease, heart attack, and stroke. Your cholesterol levels help your doctor find out your risk for having a heart attack or stroke. You and your doctor can talk about whether you need to lower your risk and what treatment is best for you. A heart-healthy lifestyle along with medicines can help lower your cholesterol and your risk. The way you choose to lower your risk will depend on how high your risk is for heart attack and stroke. It will also depend on how you feel about taking medicines. Follow-up care is a key part of your treatment and safety.  Be sure to make and go to all appointments, and call your doctor if you are having problems. It's also a good idea to know your test results and keep a list of the medicines you take. How can you care for yourself at home? · Eat a variety of foods every day. Good choices include fruits, vegetables, whole grains (like oatmeal), dried beans and peas, nuts and seeds, soy products (like tofu), and fat-free or low-fat dairy products. · Replace butter, margarine, and hydrogenated or partially hydrogenated oils with olive and canola oils. (Canola oil margarine without trans fat is fine.) · Replace red meat with fish, poultry, and soy protein (like tofu). · Limit processed and packaged foods like chips, crackers, and cookies. · Bake, broil, or steam foods. Don't reed them. · Be physically active. Get at least 30 minutes of exercise on most days of the week. Walking is a good choice. You also may want to do other activities, such as running, swimming, cycling, or playing tennis or team sports. · Stay at a healthy weight or lose weight by making the changes in eating and physical activity listed above. Losing just a small amount of weight, even 5 to 10 pounds, can reduce your risk for having a heart attack or stroke. · Do not smoke. When should you call for help? Watch closely for changes in your health, and be sure to contact your doctor if: 
· You need help making lifestyle changes. · You have questions about your medicine. Where can you learn more? Go to http://george-rajiv.info/. Enter S107 in the search box to learn more about \"High Cholesterol: Care Instructions. \" Current as of: April 3, 2017 Content Version: 11.3 © 3395-4623 [employer]. Care instructions adapted under license by [employer] (which disclaims liability or warranty for this information).  If you have questions about a medical condition or this instruction, always ask your healthcare professional. [address] any warranty or liability for your use of this information. Hypothyroidism: Care Instructions Your Care Instructions You have hypothyroidism, which means that your body is not making enough thyroid hormone. This hormone helps your body use energy. If your thyroid level is low, you may feel tired, be constipated, have an increase in your blood pressure, or have dry skin or memory problems. You may also get cold easily, even when it is warm. Women with low thyroid levels may have heavy menstrual periods. A blood test to find your thyroid-stimulating hormone (TSH) level is used to check for hypothyroidism. A high TSH level may mean that you have low thyroid. When your body is not making enough thyroid hormone, TSH levels rise in an effort to make the body produce more. The treatment for hypothyroidism is to take thyroid hormone pills. You should start to feel better in 1 to 2 weeks. But it can take several months to see changes in the TSH level. You will need regular visits with your doctor to make sure you have the right dose of medicine. Most people need treatment for the rest of their lives. You will need to see your doctor regularly to have blood tests and to make sure you are doing well. Follow-up care is a key part of your treatment and safety. Be sure to make and go to all appointments, and call your doctor if you are having problems. Its also a good idea to know your test results and keep a list of the medicines you take. How can you care for yourself at home? · Take your thyroid hormone medicine exactly as prescribed. Call your doctor if you think you are having a problem with your medicine. Most people do not have side effects if they take the right amount of medicine regularly. ¨ Take the medicine 30 minutes before breakfast, and do not take it with calcium, vitamins, or iron. ¨ Do not take extra doses of your thyroid medicine. It will not help you get better any faster, and it may cause side effects. ¨ If you forget to take a dose, do NOT take a double dose of medicine. Take your usual dose the next day. · Tell your doctor about all prescription, herbal, or over-the-counter products you take. · Take care of yourself. Eat a healthy diet, get enough sleep, and get regular exercise. When should you call for help? Call 911 anytime you think you may need emergency care. For example, call if: 
· You passed out (lost consciousness). · You have severe trouble breathing. · You have a very slow heartbeat (less than 60 beats a minute). · You have a low body temperature (95°F or below). Call your doctor now or seek immediate medical care if: 
· You feel tired, sluggish, or weak. · You have trouble remembering things or concentrating. · You do not begin to feel better 2 weeks after starting your medicine. Watch closely for changes in your health, and be sure to contact your doctor if you have any problems. Where can you learn more? Go to http://george-rajiv.info/. Enter L800 in the search box to learn more about \"Hypothyroidism: Care Instructions. \" Current as of: July 28, 2016 Content Version: 11.3 © 4236-1377 [employer]. Care instructions adapted under license by [employer] (which disclaims liability or warranty for this information). If you have questions about a medical condition or this instruction, always ask your healthcare professional. [place] [address] any warranty or liability for your use of this information. Introducing [facility]! Dear Cady Rapp: Thank you for requesting a [employer] account. Our records indicate that you have previously registered for a [employer] account but its currently inactive. Please call our [employer] support line at [phone]. Additional Information If you have questions, please visit the Frequently Asked Questions section of the [employer] website at [email or web address] Remember, MyChart is NOT to be used for urgent needs. For medical emergencies, dial 911. Now available from your iPhone and Android! Please provide this summary of care documentation to your next provider. Your primary care clinician is listed as Thedora Bloch. If you have any questions after today's visit, please call [phone].

## 2017-09-22 NOTE — PROGRESS NOTES
Jennifer Mendoza is a 61 y.o. female presents in office to be seen for thyroid problem. Health Maintenance Due   Topic Date Due    DTaP/Tdap/Td series (1 - Tdap) 02/23/1980    INFLUENZA AGE 9 TO ADULT  08/01/2017       1. Have you been to the ER, urgent care clinic since your last visit? Hospitalized since your last visit?no    2. Have you seen or consulted any other health care providers outside of the [address] [place] [address] since your last visit? Include any pap smears or colon screening.  no

## 2017-09-22 NOTE — PROGRESS NOTES
HISTORY OF PRESENT ILLNESS  Juventino Starr is a 61 y.o. female who presents for management for hypothyoirdism and hyperlipidemia. She is currently taking 75 mcg of synthroid and zocor daily and tolerating them well. She also has a history of vitamin D deficiency and takes Vitamin D3 daily. She did have a period of feeling fatigued for a couple of weeks about two months ago. Thyroid Problem   The history is provided by the patient. This is a chronic problem. The problem has not changed since onset. Pertinent negatives include no chest pain, no headaches and no shortness of breath. Associated symptoms comments: Denies cold intolerance or recent fatigue. Nothing aggravates the symptoms. The symptoms are relieved by medications. Cholesterol Problem   The history is provided by the patient. This is a chronic problem. The problem has not changed since onset. Pertinent negatives include no chest pain, no headaches and no shortness of breath. Nothing aggravates the symptoms. The symptoms are relieved by medications. Review of Systems   Constitutional: Negative for chills, fever and weight loss. Eyes: Negative for blurred vision. Respiratory: Negative for shortness of breath. Cardiovascular: Negative for chest pain. Musculoskeletal: Negative for myalgias. Neurological: Negative for dizziness and headaches. Past Medical History  1. Hypothyroidism  2. Hyperlipidemia  3. Vitamin D defiency    Objective  Visit Vitals    /74    Pulse 67    Temp 98.3 °F (36.8 °C) (Oral)    Resp 16    Ht 5' 2.01\" (1.575 m)    Wt 144 lb (65.3 kg)    SpO2 98%    BMI 26.33 kg/m2       Physical Exam   Constitutional: She is oriented to person, place, and time. She appears well-developed and well-nourished. HENT:   Head: Normocephalic. Neck: No thyromegaly present. Cardiovascular: Normal rate and regular rhythm. No murmur heard.   Pulmonary/Chest: Effort normal and breath sounds normal. Lymphadenopathy:     She has no cervical adenopathy. Neurological: She is alert and oriented to person, place, and time. Skin: Skin is warm and dry. No pallor. Psychiatric: She has a normal mood and affect. Her behavior is normal.       ASSESSMENT and PLAN    ICD-10-CM ICD-9-CM    1. Acquired hypothyroidism E03.9 244.9 TSH 3RD GENERATION      VITAMIN B12      TSH 3RD GENERATION      VITAMIN B12   2. Mixed hyperlipidemia E78.2 272.2 LIPID PANEL      METABOLIC PANEL, COMPREHENSIVE      LIPID PANEL      METABOLIC PANEL, COMPREHENSIVE   3. Vitamin D deficiency E55.9 268.9 VITAMIN D, 1, 25 DIHYDROXY      VITAMIN D, 1, 25 DIHYDROXY   4. Encounter for immunization Z23 V03.89 INFLUENZA VIRUS VAC QUAD,SPLIT,PRESV FREE SYRINGE IM      MD IMMUNIZ ADMIN,1 SINGLE/COMB VAC/TOXOID     Above labs drawn. We will await results to see if she will continue on current medication dosages since she has almost a week left of her medications. Vitamin B12 level drawn since she did have the period of fatigue. Patient verbalizes understanding and agrees with plan.

## 2017-10-05 DIAGNOSIS — E03.9 HYPOTHYROIDISM (ACQUIRED): ICD-10-CM

## 2017-10-05 DIAGNOSIS — E78.5 HYPERLIPIDEMIA, UNSPECIFIED HYPERLIPIDEMIA TYPE: ICD-10-CM

## 2017-10-05 RX ORDER — LEVOTHYROXINE SODIUM 75 UG/1
75 TABLET ORAL
Qty: 90 TAB | Refills: 1 | Status: SHIPPED | OUTPATIENT
Start: 2017-10-05 | End: 2018-07-16 | Stop reason: SDUPTHER

## 2017-10-05 RX ORDER — SIMVASTATIN 10 MG/1
10 TABLET, FILM COATED ORAL
Qty: 90 TAB | Refills: 1 | Status: SHIPPED | OUTPATIENT
Start: 2017-10-05 | End: 2018-04-14 | Stop reason: SDUPTHER

## 2017-10-05 NOTE — TELEPHONE ENCOUNTER
Medication has been pended. Requested Prescriptions     Pending Prescriptions Disp Refills    simvastatin (ZOCOR) 10 mg tablet 90 Tab 1     Sig: Take 1 Tab by mouth nightly.  levothyroxine (SYNTHROID) 75 mcg tablet 90 Tab 1     Sig: Take 1 Tab by mouth Daily (before breakfast).

## 2017-11-01 ENCOUNTER — HOSPITAL ENCOUNTER (OUTPATIENT)
Dept: PHYSICAL THERAPY | Age: 57
Discharge: HOME | End: 2017-11-01
Payer: COMMERCIAL

## 2017-11-01 PROCEDURE — 97110 THERAPEUTIC EXERCISES: CPT

## 2017-11-01 PROCEDURE — 97161 PT EVAL LOW COMPLEX 20 MIN: CPT

## 2017-11-01 NOTE — PROGRESS NOTES
[facility] PHYSICAL THERAPY AT [facility] [address], [address] - Phone: [phone]  Fax: [phone] / [address] [place] [address] PHYSICAL THERAPY SERVICES  Patient Name: Leandra Lopez : 1960   Medical   Diagnosis: Cervicalgia [M54.2] Treatment Diagnosis: Neck pain   Onset Date:      Referral Source: Xavier Howard NP Start of Care [facility]): 2017   Prior Hospitalization: See medical history Provider #: [id number]   Prior Level of Function: Work activity without pain, ADLs, IADLs without neck pain   Comorbidities: Prior episode of HA, neck pain in , Cervical disc protrusion    Medications: Verified on Patient Summary List     The Plan of Care and following information is based on the information from the initial evaluation.   ===========================================================================================  Assessment / vasquez information:   Leandra Lopez is a 61 y.o.  yo female with Dx of Cervicalgia [M54.2]. .  She currently rates B neck pain as 7/10 at worst, 1-2/10 at best, primarily located at B upper back and R mid-cervical region. She complains of difficulty and increase pain with computer work, rotating head and prolonged positions. She reports after injury in , seen for PT that relieved symptoms. No symptoms since until this episode that started after altering pillow used for sleeping. Objective Findings:  Cervical ROM: Flx  = 30, Ext = 10, Lat Flx; R = 20, L = 25, Rot: R = 45, L =40 . Manual Muscle Testing: B ER and MT, LT at 4/5  Posture: FHP, B elevated scapula. Alignment:  PIVM: reduced downgliding R midcervical and upper thoracic PA hypomobilitly.  Compression/Distraction Test: Distraction reduced symptoms  Pt instructed in HEP and will f/u in clinic for PT.  ===========================================================================================  Eval Complexity: History LOW Complexity : Zero comorbidities / personal factors that will impact the outcome / POC;  Examination  LOW Complexity : 1-2 Standardized tests and measures addressing body structure, function, activity limitation and / or participation in recreation ; Presentation LOW Complexity : Stable, uncomplicated ;  Decision Making MEDIUM Complexity : FOTO score of 26-74; Overall Complexity LOW   Problem List: pain affecting function, decrease ROM, decrease strength, decrease ADL/ functional abilitiies, decrease activity tolerance and decrease flexibility/ joint mobility   Treatment Plan may include any combination of the following: Therapeutic exercise, Therapeutic activities, Neuromuscular re-education, Physical agent/modality, Manual therapy, Patient education and Other: Cervical traction    Patient / Family readiness to learn indicated by: asking questions, trying to perform skills and interest  Persons(s) to be included in education: patient (P)  Barriers to Learning/Limitations: no  Measures taken: None needed   Patient Goal (s): Work with less pain, avoid further headaches   Rehabilitation Potential: good   Short Term Goals: To be accomplished in  1-2  Weeks:Short Term Goals: To be accomplished in  1-2  weeks:  1. Establish HEP for home traction unit usage, flexibility and joint distraction. 2. Pt will demonstrate functional and nonpainful ROM in cervical region to improve use for ADLs. 3. Patient to report work activity x 4 hours without c/o pain > 2/10.  Long Term Goals: To be accomplished in  3-4  weeks:  1. The patient will be independent in HEP for long-term management of symptoms. 2. Improve functional ability as evidenced by a score of > or = 63 on FOTO. 3. Patient will demonstrate functional and nonpainful B cervical rotation and shoulder ER, IR to improve ability to reach [place] for items.   4. Pt will report pain reduction to < 3/10 x 1 week and verb understanding of posture and body mechanics to reduce compression forces in cervical spine to reduce likelihood of reoccurrence. Frequency / Duration:   Patient to be seen   2  times per week for 3-4  weeks:  Patient / Caregiver education and instruction: self care and exercises  G-Codes (GP): n/a  Therapist Signature: Rajni Cardenas PT Date: 34/6/4770   Certification Period: n/a Time: 4:53 PM   ===========================================================================================  I certify that the above Physical Therapy Services are being furnished while the patient is under my care. I agree with the treatment plan and certify that this therapy is necessary. Physician Signature:        Date:       Time:     Please sign and return to [facility] or you may fax the signed copy to [phone]. Thank you.

## 2017-11-01 NOTE — PROGRESS NOTES
PHYSICAL THERAPY - DAILY TREATMENT NOTE    Patient Name: Soren Cobb        Date: 2017  : 1960   YES Patient  Verified  Visit #:   1   of   8  Insurance: Payor: Morene Rash / Plan: [address] PT / Product Type: Commerical /      In time: 315 Out time: 410   Total Treatment Time: 55     Medicare Time Tracking (below)   Total Timed Codes (min):   1:1 Treatment Time:       TREATMENT AREA =  Cervicalgia [M54.2]    SUBJECTIVE                                                         See IE            OBJECTIVE    Modality rationale: decrease pain to improve the patients ability to perform work      min - Estim, type:                                          -  att     -  unatt     -  w/US     -  w/ice    -  w/heat    min -  Mechanical Traction: type/lbs                                               -  pro   -  sup   -  int   -  cont    min -  Ultrasound, settings/location:      min -  Iontophoresis:  -  take home patch w/ dexamethazone    min                                -  in clinic w/ dexamethazone   10 min -  Ice     -x  Heat     position: supine    min -  Other:    -x Skin assessment post-treatment (if applicable):    x-  intact    -  redness- no adverse reaction     -redness - adverse reaction:        15 min Therapeutic Exercise:  x_  See flow sheet   Rationale:      increase ROM and increase strength to improve the patients ability to perform ADLs      T/o tx min Patient Education:  YES  Reviewed HEP   - A and P related to current DX - LTGs and POC   -  Progressed/Changed HEP based on:         Other Objective/Functional Measures:                                   See IE           ASSESSMENT    -  See Initial Evaluation     PLAN    -  Upgrade activities as tolerated YES Continue plan of care   -  Discharge due to :    -  Other: Pt will return for follow up and to initiate POC     Therapist: Dallas Luevano PT, Jaelyn ASIF 61    Date: 2017 Time: 4:50 PM

## 2017-11-03 ENCOUNTER — HOSPITAL ENCOUNTER (OUTPATIENT)
Dept: PHYSICAL THERAPY | Age: 57
Discharge: HOME | End: 2017-11-03
Payer: COMMERCIAL

## 2017-11-03 PROCEDURE — 97110 THERAPEUTIC EXERCISES: CPT

## 2017-11-03 PROCEDURE — 97140 MANUAL THERAPY 1/> REGIONS: CPT

## 2017-11-03 NOTE — PROGRESS NOTES
PHYSICAL THERAPY - DAILY TREATMENT NOTE    Patient Name: Ash James        Date: 11/3/2017  : 1960   YES Patient  Verified  Visit #:   2   of   -  Insurance: Payor: Henry Chung / Plan: [address] PT / Product Type: Commerical /      In time: 305 Out time: 4   Total Treatment Time: 55     TREATMENT AREA =  Cervicalgia [M54.2]    SUBJECTIVE  Pain Level (on 0 to 10 scale):  2-3  / 10   Medication Changes/New allergies or changes in medical history, any new surgeries or procedures? NO    If yes, update Summary List   Subjective Functional Status/Changes:  []  No changes reported     Functional improvements:feeling progress already. Noting improved rotation after 1st visit. Becoming mindful of neck and head position during day. No HA this day. Functional impairments: HA's, pain with prolonged sitting, rotating head for driving.        OBJECTIVE  Modalities Rationale:     decrease pain to improve patient's ability to perform work   min [] Estim, type/location:                                      []  att     []  unatt     []  w/US     []  w/ice    []  w/heat    min []  Mechanical Traction: type/lbs                   []  pro   []  sup   []  int   []  cont    []  before manual    []  after manual    min []  Ultrasound, settings/location:      min []  Iontophoresis w/ dexamethasone, location:                                               []  take home patch       []  in clinic   10 min []  Ice     [x]  Heat    location/position: R cervical in supine    min []  Vasopneumatic Device, press/temp:     min []  Other:    [x] Skin assessment post-treatment (if applicable):    [x]  intact    []  redness- no adverse reaction     []redness - adverse reaction:        25 min Manual Therapy: Technique:      x- S/DTM -IASTM -PROM - Passive Stretching   -manual TPR    x-Jt manipulation:Gr I-IV  Treatment Area:  R midcervical downglides prone, supine, BARRIE, SOR  Seated rotation B with mobilization with movement   Rationale:      increase ROM, increase tissue extensibility and increase postural awareness to improve patient's ability to perform work    15 min Therapeutic Exercise:  _  See flow sheet   Rationale:      increase ROM and increase strength to improve the patients ability to perform work        throughout therapy min Patient Education:  YES  Reviewed HEP   []  Progressed/Changed HEP based on: Other Objective/Functional Measures:    Cervical AROM:  R to 55, L to 58 with pain at [facility] in cervical region. SB to 24 degrees. Pt with pain during cervical RR, R SB and ext, focus on downgliding for treatment. Upper cervical testing negative. Post Treatment Pain Level (on 0 to 10) scale:   1*  / 10     ASSESSMENT  Assessment/Changes in Function:     Post manual cervical AROM:  R to 68, L to 70  SB nonpainful and improved to 35L , 30 R. []  See Progress Note/Recertification   Patient will continue to benefit from skilled PT services to modify and progress therapeutic interventions, address functional mobility deficits, address ROM deficits, address strength deficits and analyze and address soft tissue restrictions to attain remaining goals.    Progress toward goals / Updated goals:    Pt with good progress to I with HEP and cervical rotation goal.     PLAN  []  Upgrade activities as tolerated YES Continue plan of care   []  Discharge due to :    []  Other:      Therapist: Claudette Gray, PT    Date: 11/3/2017 Time: 4:36 PM     Future Appointments  Date Time Provider Jermaine Tucker   11/7/2017 3:00 PM Amina Camacho, PT [facility]   11/13/2017 4:00 PM Claudette Gray, PT [facility]   11/17/2017 3:00 PM Claudette Gray, PT [facility]   11/20/2017 3:00 PM Beny Preston, PT [facility]

## 2017-11-07 ENCOUNTER — HOSPITAL ENCOUNTER (OUTPATIENT)
Dept: PHYSICAL THERAPY | Age: 57
Discharge: HOME | End: 2017-11-07
Payer: COMMERCIAL

## 2017-11-07 PROCEDURE — 97110 THERAPEUTIC EXERCISES: CPT

## 2017-11-07 PROCEDURE — 97140 MANUAL THERAPY 1/> REGIONS: CPT

## 2017-11-07 NOTE — PROGRESS NOTES
PHYSICAL THERAPY - DAILY TREATMENT NOTE      Patient Name: Felice Lopez        Date: 2017  : 1960   YES Patient  Verified  Visit #:   3   of   12  Insurance: Payor: Libia Screws / Plan: [address] PT / Product Type: Commerical /      In time: 300 Out time: 400   Total Treatment Time: 60     Medicare Time Tracking (below)   Total Timed Codes (min):   1:1 Treatment Time:       TREATMENT AREA =  Cervicalgia [M54.2]    SUBJECTIVE    Pain Level (on 0 to 10 scale):  4  / 10   Medication Changes/New allergies or changes in medical history, any new surgeries or procedures?     NO    If yes, update Summary List   Subjective Functional Status/Changes:  []  No changes reported     Reports soreness in cervical region at onset of visit, limited cervical mobility at onset of visit      OBJECTIVE  Modalities Rationale:     decrease pain and increase tissue extensibility to improve patient's ability to perform ADLs      min [] Estim, type/location:                                      []  att     []  unatt     []  w/US     []  w/ice    []  w/heat    min []  Mechanical Traction: type/lbs                   []  pro   []  sup   []  int   []  cont    []  before manual    []  after manual    min []  Ultrasound, settings/location:      min []  Iontophoresis w/ dexamethasone, location:                                               []  take home patch       []  in clinic   10 min []  Ice     [x]  Heat    location/position:     min []  Vasopneumatic Device, press/temp:     min []  Other:    [x] Skin assessment post-treatment (if applicable):    [x]  intact    [x]  redness- no adverse reaction     []redness - adverse reaction:      20 min Therapeutic Exercise:  [x]  See flow sheet   Rationale:      increase ROM to improve the patients ability to perform ADLs, increase positional tolerance      30 min Manual Therapy: Technique:      [x] S/DTM []IASTM []PROM [] Passive Stretching   [x]manual TPR    []Jt manipulation:Gr I [] II []  III [] IV[x] V[]  Treatment Area:  DTM to (R) suboccipital, (L) anterior middle scalene, (R) Levator scap, mid cervical paraspinals, (R) posterior scalene, MET for ESR OA, LR AA, (R) downglide C4-C5, SNAG for (L) rotation C6-C7, MET for ERSR T1/T2   Rationale:      decrease pain, increase ROM, increase tissue extensibility and decrease trigger points to improve patient's ability to perform ADLs       min Gait Training:    Rationale:        throughout therapy min Patient Education:  YES  Reviewed HEP   []  Progressed/Changed HEP based on: Other Objective/Functional Measures:    Limited cervical rotation at onset of visit (R) > (L) with end range pain, Increase mm hypertonicity with TPs to muscles as noted above, limited OA flexion (L) SBing, limited downglide for mid cervical region with limited (R) side glide to C6-C7     Post Treatment Pain Level (on 0 to 10) scale:   2  / 10     ASSESSMENT    Assessment/Changes in Function:     Pt advised in use of towel roll for cervical support while laying in bed, use of heat PRN for pain control      []  See Progress Note/Recertification   Patient will continue to benefit from skilled PT services to modify and progress therapeutic interventions, address functional mobility deficits, address ROM deficits, address strength deficits, analyze and address soft tissue restrictions and analyze and cue movement patterns to attain remaining goals. Progress toward goals / Updated goals:     Will monitor for change in s/s with use of manual therapy techniques next visit      PLAN    []  Upgrade activities as tolerated YES Continue plan of care   []  Discharge due to :    []  Other:      Therapist: Ko Boo PT    Date: 11/7/2017 Time: 1:02 PM   Future Appointments  Date Time Provider Jermaine Tucker   11/7/2017 3:00 PM Ko Boo PT REHAB CENTER AT [facility]   11/13/2017 4:00  [address], PT REHAB CENTER AT [facility]   11/17/2017 3:00 PM Cinthya Madrid PT REHAB CENTER AT [facility]   11/20/2017 3:00 PM Henny Carranza PT REHAB CENTER AT [facility]

## 2017-11-13 ENCOUNTER — APPOINTMENT (OUTPATIENT)
Dept: PHYSICAL THERAPY | Age: 57
End: 2017-11-13
Payer: COMMERCIAL

## 2017-11-13 ENCOUNTER — HOSPITAL ENCOUNTER (OUTPATIENT)
Dept: PHYSICAL THERAPY | Age: 57
Discharge: HOME | End: 2017-11-13
Payer: COMMERCIAL

## 2017-11-13 PROCEDURE — 97140 MANUAL THERAPY 1/> REGIONS: CPT

## 2017-11-13 PROCEDURE — 97110 THERAPEUTIC EXERCISES: CPT

## 2017-11-13 NOTE — PROGRESS NOTES
PHYSICAL THERAPY - DAILY TREATMENT NOTE      Patient Name: Armen Venegas        Date: 2017  : 1960   YES Patient  Verified  Visit #:    Insurance: Payor: Guerda Beckett / Plan: [address] / Product Type: Commerical /      In time: 410 Out time: 5   Total Treatment Time: 50     Medicare Time Tracking (below)   Total Timed Codes (min):   1:1 Treatment Time:       TREATMENT AREA =  Cervicalgia [M54.2]    SUBJECTIVE    Pain Level (on 0 to 10 scale):  4  / 10   Medication Changes/New allergies or changes in medical history, any new surgeries or procedures? NO    If yes, update Summary List   Subjective Functional Status/Changes:  []  No changes reported     Pt reports less HA, less pain overall. Still difficult to turn her head for driving, looking over her R shoulder.        OBJECTIVE  Modalities Rationale:     decrease pain and increase tissue extensibility to improve patient's ability to perform ADLs      min [] Estim, type/location:                                      []  att     []  unatt     []  w/US     []  w/ice    []  w/heat    min []  Mechanical Traction: type/lbs                   []  pro   []  sup   []  int   []  cont    []  before manual    []  after manual    min []  Ultrasound, settings/location:      min []  Iontophoresis w/ dexamethasone, location:                                               []  take home patch       []  in clinic   10 min []  Ice     [x]  Heat    location/position: Supine to cervical    min []  Vasopneumatic Device, press/temp:     min []  Other:    [x] Skin assessment post-treatment (if applicable):    [x]  intact    [x]  redness- no adverse reaction     []redness - adverse reaction:      25 min Therapeutic Exercise:  [x]  See flow sheet   Rationale:      increase ROM to improve the patients ability to perform ADLs,IADLs      15 min Manual Therapy: Technique:      [x] S/DTM []IASTM []PROM [] Passive Stretching   [x]manual TPR    []Jt manipulation:Gr I [] II []  III [] IV[x] V[]  Treatment Area:    BARRIE, SOR with cervical downglides mid-cervical R, L up glides mid-cervical, STM R UT, scalenes, SCM   Rationale:      decrease pain, increase ROM, increase tissue extensibility and decrease trigger points to improve patient's ability to perform ADLs, IADLs. min Gait Training:    Rationale:        throughout therapy min Patient Education:  Linda Nix   []  Progressed/Changed HEP based on: Other Objective/Functional Measures:    Cervical rotation to 45-50 degrees R, L 60 degrees at onset of tx. D/W pt postural considerations with cervical ROM, work, and function. Post Treatment Pain Level (on 0 to 10) scale:   2  / 10     ASSESSMENT    Assessment/Changes in Function:     Pt cervical R to 60 and L 68.     []  See Progress Note/Recertification   Patient will continue to benefit from skilled PT services to modify and progress therapeutic interventions, address functional mobility deficits, address ROM deficits, address strength deficits, analyze and address soft tissue restrictions and analyze and cue movement patterns to attain remaining goals. Progress toward goals / Updated goals:    Pt with good progress to STG, LTGs. I with initial HEP.        PLAN    [x]  Upgrade activities as tolerated YES Continue plan of care   []  Discharge due to :    []  Other:      Therapist: Umm Sibley PT    Date: 11/13/2017 Time: 2:42 PM     Future Appointments  Date Time Provider Jermaine Tucker   11/17/2017 3:00 PM Umm Sibley PT REHAB CENTER AT [facility]   11/20/2017 3:00 PM Jan Franco PT REHAB CENTER AT [facility]

## 2017-11-16 ENCOUNTER — HOSPITAL ENCOUNTER (OUTPATIENT)
Dept: PHYSICAL THERAPY | Age: 57
Discharge: HOME | End: 2017-11-16
Payer: COMMERCIAL

## 2017-11-16 PROCEDURE — 97140 MANUAL THERAPY 1/> REGIONS: CPT

## 2017-11-16 PROCEDURE — 97110 THERAPEUTIC EXERCISES: CPT

## 2017-11-16 NOTE — PROGRESS NOTES
PHYSICAL THERAPY - DAILY TREATMENT NOTE      Patient Name: Ted Main        Date: 2017  : 1960   YES Patient  Verified  Visit #:      of     Insurance: Payor: Aundrea Fernandez / Plan: [address] PT / Product Type: Commerical /      In time: 305 Out time: 350   Total Treatment Time: 45     Medicare Time Tracking (below)   Total Timed Codes (min):   1:1 Treatment Time:       TREATMENT AREA =  Cervicalgia [M54.2]    SUBJECTIVE    Pain Level (on 0 to 10 scale):  2  / 10   Medication Changes/New allergies or changes in medical history, any new surgeries or procedures?     NO    If yes, update Summary List   Subjective Functional Status/Changes:  []  No changes reported     Reports soreness in (R) cervical region in (R) UT/LS region      OBJECTIVE  Modalities Rationale:     decrease inflammation and decrease pain to improve patient's ability to perform ADLs      min [] Estim, type/location:                                      []  att     []  unatt     []  w/US     []  w/ice    []  w/heat    min []  Mechanical Traction: type/lbs                   []  pro   []  sup   []  int   []  cont    []  before manual    []  after manual    min []  Ultrasound, settings/location:      min []  Iontophoresis w/ dexamethasone, location:                                               []  take home patch       []  in clinic   10 min []  Ice     [x]  Heat    location/position: Cs region, (R) shld region     min []  Vasopneumatic Device, press/temp:     min []  Other:    [x] Skin assessment post-treatment (if applicable):    [x]  intact    []  no adverse reaction     []redness - adverse reaction:      25 min Therapeutic Exercise:  [x]  See flow sheet   Rationale:      increase ROM and increase strength to improve the patients ability to perform ADLs     10 min Manual Therapy: Technique:      [x] S/DTM []IASTM []PROM [] Passive Stretching   [x]manual TPR    []Jt manipulation:Gr I [] II []  III [x] IV[] V[]  Treatment Area:  (R) posterior scalene, (R) LS, (L) suboccipitals    Rationale:      decrease pain, increase ROM, increase tissue extensibility and decrease trigger points to improve patient's ability to perform ADLs      throughout therapy min Patient Education:  YES  Reviewed HEP   []  Progressed/Changed HEP based on: Other Objective/Functional Measures:    Demonstrates soreness in (R) upper trap, (R) posterior scalene     Post Treatment Pain Level (on 0 to 10) scale:   0  / 10     ASSESSMENT    Assessment/Changes in Function:     Progressing with overall function and ADL tolerance      []  See Progress Note/Recertification   Patient will continue to benefit from skilled PT services to modify and progress therapeutic interventions, address functional mobility deficits, address ROM deficits, address strength deficits, analyze and address soft tissue restrictions, analyze and cue movement patterns and analyze and modify body mechanics/ergonomics to attain remaining goals. Progress toward goals / Updated goals:     Will monitor and progress as able     PLAN    []  Upgrade activities as tolerated YES Continue plan of care   []  Discharge due to :    []  Other:      Therapist: Miguelangel Fischer PT    Date: 11/16/2017 Time: 4:17 PM   Future Appointments  Date Time Provider Jermaine Tucker   11/20/2017 3:00 PM Danilo Alfonso, PT REHAB CENTER AT [facility]   11/27/2017 3:00 PM iCnthya Madrid, PT REHAB CENTER AT [facility]

## 2017-11-17 ENCOUNTER — APPOINTMENT (OUTPATIENT)
Dept: PHYSICAL THERAPY | Age: 57
End: 2017-11-17
Payer: COMMERCIAL

## 2017-11-20 ENCOUNTER — HOSPITAL ENCOUNTER (OUTPATIENT)
Dept: PHYSICAL THERAPY | Age: 57
Discharge: HOME | End: 2017-11-20
Payer: COMMERCIAL

## 2017-11-20 PROCEDURE — 97110 THERAPEUTIC EXERCISES: CPT

## 2017-11-20 PROCEDURE — 97140 MANUAL THERAPY 1/> REGIONS: CPT
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
## 2017-11-27 ENCOUNTER — HOSPITAL ENCOUNTER (OUTPATIENT)
Dept: PHYSICAL THERAPY | Age: 57
Discharge: HOME | End: 2017-11-27
Payer: COMMERCIAL

## 2017-11-27 PROCEDURE — 97110 THERAPEUTIC EXERCISES: CPT

## 2017-11-27 PROCEDURE — 97140 MANUAL THERAPY 1/> REGIONS: CPT

## 2017-11-27 NOTE — PROGRESS NOTES
PHYSICAL THERAPY - DAILY TREATMENT NOTE      Patient Name: Hugo Chacko        Date: 2017  : 1960   YES Patient  Verified  Visit #:     Insurance: Payor: Juan Moore / Plan: [address] [place] [address] Rd PT / Product Type: Commerical /      In time: 3:00 Out time: 3:50   Total Treatment Time: 50     Medicare Time Tracking (below)   Total Timed Codes (min):  n/a 1:1 Treatment Time:  n/a     TREATMENT AREA =  Cervicalgia [M54.2]    SUBJECTIVE    Pain Level (on 0 to 10 scale):  3  / 10   Medication Changes/New allergies or changes in medical history, any new surgeries or procedures?     NO    If yes, update Summary List   Subjective Functional Status/Changes:  []  No changes reported       Functional improvements: none reports  Functional impairments: pain with ADLs         OBJECTIVE  Modalities Rationale:     decrease pain to improve patient's ability to complete ADLs      min [] Estim, type/location:                                      []  att     []  unatt     []  w/US     []  w/ice    []  w/heat    min []  Mechanical Traction: type/lbs                   []  pro   []  sup   []  int   []  cont    []  before manual    []  after manual    min []  Ultrasound, settings/location:      min []  Iontophoresis w/ dexamethasone, location:                                               []  take home patch       []  in clinic   10 min []  Ice     [x]  Heat    location/position: Supine cervical    min []  Vasopneumatic Device, press/temp:     min []  Other:    [x] Skin assessment post-treatment (if applicable):    [x]  intact    []  redness- no adverse reaction     []redness - adverse reaction:      25 min Therapeutic Exercise:  [x]  See flow sheet   Rationale:      increase ROM and increase strength to improve the patients ability to complete ADLs     15 min Manual Therapy: Technique:      [] S/DTM []IASTM []PROM [] Passive Stretching   []manual TPR    []Jt manipulation:Gr I [] II []  III [] IV[] V[]  Treatment Area:  SOR, DTM cervical paraspinals, UT, levator; manual traction, UT stretch   Rationale:      decrease pain, increase ROM and increase tissue extensibility to improve patient's ability to complete ADLs       min Patient Education:  YES  Reviewed HEP   []  Progressed/Changed HEP based on: Other Objective/Functional Measures:    Patient continuing to report 3/10 pain with ADLs. Post Treatment Pain Level (on 0 to 10) scale:   2  / 10     ASSESSMENT    Assessment/Changes in Function:     Awaiting cervical DARREN.     []  See Progress Note/Recertification   Patient will continue to benefit from skilled PT services to modify and progress therapeutic interventions, address functional mobility deficits, address ROM deficits, address strength deficits, analyze and address soft tissue restrictions, analyze and cue movement patterns, analyze and modify body mechanics/ergonomics and assess and modify postural abnormalities to attain remaining goals. to attain remaining goals. Progress toward goals / Updated goals:    Progress limited by pain. PLAN    [x]  Upgrade activities as tolerated YES Continue plan of care   []  Discharge due to :    []  Other:      Therapist: Kahlil Melendez PT    Date: 11/27/2017 Time: 4:25 PM   No future appointments.

## 2017-11-29 ENCOUNTER — APPOINTMENT (OUTPATIENT)
Dept: PHYSICAL THERAPY | Age: 57
End: 2017-11-29
Payer: COMMERCIAL

## 2018-01-11 ENCOUNTER — HOSPITAL ENCOUNTER (OUTPATIENT)
Dept: PHYSICAL THERAPY | Age: 58
Discharge: HOME | End: 2018-01-11
Payer: COMMERCIAL

## 2018-01-11 PROCEDURE — 97161 PT EVAL LOW COMPLEX 20 MIN: CPT | Performed by: PHYSICAL THERAPIST

## 2018-01-11 NOTE — PROGRESS NOTES
.[facility] [address] THERAPY  [address] [place] [address] 51, [address],[facility], [address] - Phone: [phone]  Fax: [phone] / [address] [address] PHYSICAL THERAPY SERVICES  Patient Name: Chad San : 1960   Medical   Diagnosis: Chronic neck pain Treatment Diagnosis: Neck pain [M54.2]   Onset Date:      Referral Source: Stella Sanchez NP Start of Care [facility]): 2018   Prior Hospitalization: See medical history Provider #: [id number]   Prior Level of Function: full   Comorbidities: Arthritis, thyroid problems   Medications: Verified on Patient Summary List   The Plan of Care and following information is based on the information from the initial evaluation.   ===========================================================================================  Assessment / key information:  61 RHD F arrives to clinic with c/o chronic neck pain after hitting her forehead in . Since onset has intermittent episodes of neck and B shoulder pain and HA. Previous treatment has included injections, meds, PT and chiropractic care. Steroid injections have provided the most relief and  reduced R sided pain 5/`0 at worst, L 9/10 with prolonged activities. Objective findings: c/s ext, rr 50% loss, l rotation 25% loss B SB wnl +R spurllings, t/s arom reduced in rr with poor scpaulothoracic rhythm.  Reduced mid and low trap activation will attempt PT to address problem list below.   ===========================================================================================  Eval Complexity: History MEDIUM  Complexity : 1-2 comorbidities / personal factors will impact the outcome/ POC ;  Examination  HIGH Complexity : 4+ Standardized tests and measures addressing body structure, function, activity limitation and / or participation in recreation ; Presentation LOW Complexity : Stable, uncomplicated ;  Decision Making MEDIUM Complexity : FOTO score of 26-74; Overall Complexity MEDIUM  Problem List: pain affecting function, decrease ROM, decrease strength, decrease ADL/ functional abilitiies, decrease activity tolerance, decrease flexibility/ joint mobility and decrease transfer abilities   Treatment Plan may include any combination of the following: Therapeutic exercise, Therapeutic activities, Neuromuscular re-education, Physical agent/modality, Gait/balance training, Manual therapy, Patient education, Self Care training and Functional mobility training  Patient / Family readiness to learn indicated by: asking questions, trying to perform skills and interest  Persons(s) to be included in education: patient (P)  Barriers to Learning/Limitations: None  Measures taken:    Patient Goal (s): Decrease pain   Patient self reported health status: good  Rehabilitation Potential: good   Short Term Goals: To be accomplished in  2  weeks:  1. Pt will be compliant with hep  2. Pt will be able to perform supine upper cervical flexion in order to improve mobility for adls such as reading  3. Pt will note daily max pain </=7/10 in order to increase participation     Long Term Goals: To be accomplished in  4  weeks:  1. Pt will increase FOTO by  5 points in order to show functional improvement  2. Pt will note daily max pain </=4/10 in order to increase participation in adls  3. Pt will note 40% reduction in s/s in order to increase participation in adls  Frequency / Duration:   Patient to be seen  2  times per week for 4  weeks:  Patient / Caregiver education and instruction: self care and activity modification  G-Codes (GP): chelsea  Therapist Signature: William Mcclain PT, DPT, CMT Date: 0/00/1079   Certification Period: na Time: 3:44 PM   ===========================================================================================  I certify that the above Physical Therapy Services are being furnished while the patient is under my care.   I agree with the treatment plan and certify that this therapy is necessary. Physician Signature:        Date:       Time:     Please sign and return to [facility] at [facility], [facility]. or you may fax the signed copy to [phone]. Thank you.

## 2018-01-11 NOTE — PROGRESS NOTES
[facility] PHYSICAL THERAPY - DAILY TREATMENT NOTE    Patient Name: Reinaldo Hirsch        Date: 2018  : 1960   yes Patient  Verified  Visit #:   1   of   9  Insurance: Payor: Cierra Gallardo / Plan: Lidya Price PPO / Product Type: PPO /      In time: 300 Out time: 330   Total Treatment Time: 30     Medicare Time Tracking (below)   Total Timed Codes (min):  na 1:1 Treatment Time:  na     TREATMENT AREA =  Neck pain [M54.2]    SUBJECTIVE  Pain Level (on 0 to 10 scale):  ie  / 10   Medication Changes/New allergies or changes in medical history, any new surgeries or procedures?    no  If yes, update Summary List   Subjective Functional Status/Changes:  []  No changes reported     See ie          OBJECTIVE     min Patient Education:  yes  Reviewed HEP   []  Progressed/Changed HEP based on: Other Objective/Functional Measures:    Demo hep without an increase in pain  See ie     Post Treatment Pain Level (on 0 to 10) scale:   ie  / 10     ASSESSMENT  Assessment/Changes in Function:     See ie     []  See Progress Note/Recertification   Patient will continue to benefit from skilled PT services to modify and progress therapeutic interventions, address functional mobility deficits, address ROM deficits, address strength deficits, analyze and address soft tissue restrictions, analyze and cue movement patterns, analyze and modify body mechanics/ergonomics, assess and modify postural abnormalities and instruct in home and community integration to attain remaining goals.    Progress toward goals / Updated goals:    See ie     PLAN  []  Upgrade activities as tolerated yes Continue plan of care   []  Discharge due to :    []  Other:      Therapist: Marco A Couch PT    Date: 2018 Time: 3:45 PM     Future Appointments  Date Time Provider Jermaine Tucker   2018 3:00 PM Marco A Couch PT [facility]   2018 3:00 PM Marco A Couch PT [facility]   2018 2:30 PM Marco A Couch PT [facility]   2018 2:30 PM William Mcclain, PT [facility]   1/30/2018 3:00 PM William Mcclain, PT [facility]   2/1/2018 2:30 PM William Mcclain, PT [facility]   2/6/2018 3:00 PM William Mcclain, PT [facility]   2/8/2018 2:30 PM William Mcclain, PT [facility]

## 2018-01-12 ENCOUNTER — OFFICE VISIT (OUTPATIENT)
Dept: FAMILY MEDICINE CLINIC | Age: 58
End: 2018-01-12

## 2018-01-12 VITALS
WEIGHT: 143 LBS | TEMPERATURE: 97.2 F | HEIGHT: 62 IN | OXYGEN SATURATION: 98 % | RESPIRATION RATE: 16 BRPM | SYSTOLIC BLOOD PRESSURE: 121 MMHG | DIASTOLIC BLOOD PRESSURE: 72 MMHG | BODY MASS INDEX: 26.31 KG/M2 | HEART RATE: 68 BPM

## 2018-01-12 DIAGNOSIS — Z01.419 WELL WOMAN EXAM WITH ROUTINE GYNECOLOGICAL EXAM: Primary | ICD-10-CM

## 2018-01-12 NOTE — PATIENT INSTRUCTIONS
Pelvic Exam: Care Instructions  Your Care Instructions    When your doctor examines all of your pelvic organs, it's called a pelvic exam. Two good reasons to have this kind of exam are to check for sexually transmitted infections (STIs) and to get a Pap test. A Pap test is also called a Pap smear. It checks for early changes that can lead to cancer of the cervix. Sometimes a pelvic exam is part of a regular checkup. In this case, you can do some things to make your test results as accurate as possible. · Try to schedule the exam when you don't have your period. · Don't use douches, tampons, or vaginal medicines, sprays, or powders for 24 hours before your exam.  · Don't have sex for 24 hours before your exam.  Other times, women have this kind of exam at any time of the month. This is because they have pelvic pain, bleeding, or discharge. Or they may have another pelvic problem. Before your exam, it's important to share some information with your doctor. For example, if you are a survivor of rape or sexual abuse, you can talk about any concerns you may have. Your doctor will also want to know if you are pregnant or use birth control. And he or she will want to hear about any problems, surgeries, or procedures you have had in your pelvic area. You will also need to tell your doctor when your last period was. Follow-up care is a key part of your treatment and safety. Be sure to make and go to all appointments, and call your doctor if you are having problems. It's also a good idea to know your test results and keep a list of the medicines you take. How is a pelvic exam done? · During a pelvic exam, you will:  ¨ Take off your clothes below the waist. You will get a paper or cloth cover to put over the lower half of your body. Nasrin Moore on your back on an exam table. Your feet will be raised above you. Stirrups will support your feet. · The doctor will:  Marc Pod you to relax your knees.  Your knees need to lean out, toward the walls. ¨ Check the opening of your vagina for sores or swelling. ¨ Gently put a tool called a speculum into your vagina. It opens the vagina a little bit. You will feel some pressure. But if you are relaxed, it will not hurt. It lets your doctor see inside the vagina. ¨ Use a small brush, spatula, or swab to get a sample of cells, if you are having a Pap test or culture. The doctor then removes the speculum. ¨ Put on gloves and put one or two fingers of one hand into your vagina. The other hand goes on your lower belly. This lets your doctor feel your pelvic organs. You will probably feel some pressure. Try to stay relaxed. ¨ Put one gloved finger into your rectum and one into your vagina, if needed. This can also help check your pelvic organs. This exam takes about 10 minutes. At the end, you will get a washcloth or tissue to clean your vaginal area. It's normal to have some discharge after this exam. You can then get dressed. Some test results may be ready right away. But results from a culture or a Pap test may take several days or a few weeks. Why should you have a pelvic exam?  · You want to have recommended screening tests. This includes a Pap test.  · You think you have a vaginal infection. Signs include itching, burning, or unusual discharge. · You might have been exposed to a sexually transmitted infection (STI), such as chlamydia or herpes. · You have vaginal bleeding that is not part of your normal menstrual period. · You have pain in your belly or pelvis. · You have been sexually assaulted. A pelvic exam lets your doctor collect evidence and check for STIs. · You are pregnant. · You are having trouble getting pregnant. What are the risks of a pelvic exam?  There are no risks from a pelvic exam.  When should you call for help? Watch closely for changes in your health, and be sure to contact your doctor if you have any problems. Where can you learn more?   Go to http://george-rajiv.info/. Enter W698 in the search box to learn more about \"Pelvic Exam: Care Instructions. \"  Current as of: October 13, 2016  Content Version: 11.4  © 5898-4995 Healthwise, [employer]. Care instructions adapted under license by [employer] (which disclaims liability or warranty for this information). If you have questions about a medical condition or this instruction, always ask your healthcare professional. [place] [address] any warranty or liability for your use of this information.

## 2018-01-12 NOTE — MR AVS SNAPSHOT
Visit Information Date & Time Provider Department Dept. Phone Encounter #  
 1/12/2018  2:45 PM Norma Aguila, [address] [facility] [phone] [id number] Upcoming Health Maintenance Date Due  
 PAP AKA CERVICAL CYTOLOGY 8/18/2018 BREAST CANCER SCRN MAMMOGRAM 6/20/2019 COLONOSCOPY 4/12/2027 DTaP/Tdap/Td series (2 - Td) 1/12/2028 Allergies as of 1/12/2018  Review Complete On: 1/12/2018 By: Mike Negron LPN No Known Allergies Current Immunizations  Reviewed on 9/22/2017 Name Date Influenza Vaccine 11/20/2014 Influenza Vaccine (Quad) PF 9/22/2017, 12/12/2016 Zoster Vaccine, Live 11/20/2014 Not reviewed this visit You Were Diagnosed With   
  
 Codes Comments Well woman exam with routine gynecological exam    -  Primary ICD-10-CM: W00.635 ICD-9-CM: V72.31 Vitals BP Pulse Temp Resp Height(growth percentile) Weight(growth percentile) 121/72 68 97.2 °F (36.2 °C) (Oral) 16 5' 2.01\" (1.575 m) 143 lb (64.9 kg) SpO2 BMI OB Status Smoking Status 98% 26.15 kg/m2 Postmenopausal Former Smoker Vitals History BMI and BSA Data Body Mass Index Body Surface Area  
 26.15 kg/m 2 1.69 m 2 Preferred Pharmacy Pharmacy Name Phone [address] [phone] Your Updated Medication List  
  
   
This list is accurate as of: 1/12/18  4:13 PM.  Always use your most recent med list.  
  
  
  
  
 ALIGN 4 mg Cap Generic drug:  Bifidobacterium Infantis Take  by mouth. FISH OIL Cap Generic drug:  omega-3 fatty acids 3,000 mg.  
  
 Glucosamine &Chondroit-MV-Min3 754-665-94-0.5 mg Tab Take  by mouth. HAIR, SKIN, NAILS WITH BIOTIN PO Take  by mouth.  
  
 levothyroxine 75 mcg tablet Commonly known as:  SYNTHROID Take 1 Tab by mouth Daily (before breakfast). meloxicam 15 mg tablet Commonly known as:  MOBIC  
take 1 tablet by mouth once daily with food PRESERVISION AREDS PO Take  by mouth. simvastatin 10 mg tablet Commonly known as:  ZOCOR Take 1 Tab by mouth nightly. VITAMIN D3 PO Take  by mouth. To-Do List   
 01/12/2018 Lab:  [facility] VAGINITIS PLUS   
  
 01/12/2018 Pathology:  PAP IG, APTIMA HPV AND RFX 16/18,45 ([id number])   
  
 01/16/2018 3:00 PM  
  Appointment with Nancy Marrero PT at [address] [place] [address] ([phone])  
  
 01/17/2018 3:00 PM  
  Appointment with Nancy Marrero PT at [address] ([phone])  
  
 01/23/2018 2:30 PM  
  Appointment with Nancy Marrero PT at [address] ([phone])  
  
 01/24/2018 2:30 PM  
  Appointment with Nancy Marrero PT at [address] ([phone])  
  
 01/30/2018 3:00 PM  
  Appointment with Nancy Marrero PT at [address] ([phone])  
  
 02/01/2018 2:30 PM  
  Appointment with Nancy Marrero PT at [address] [place] [address] ([phone]) 02/06/2018 3:00 PM  
  Appointment with Nancy Marrero PT at [address] [place] [address] ([phone]) 02/08/2018 2:30 PM  
  Appointment with Nancy Marrero PT at [address] [place] [address] ([phone]) Patient Instructions Pelvic Exam: Care Instructions Your Care Instructions When your doctor examines all of your pelvic organs, it's called a pelvic exam. Two good reasons to have this kind of exam are to check for sexually transmitted infections (STIs) and to get a Pap test. A Pap test is also called a Pap smear. It checks for early changes that can lead to cancer of the cervix. Sometimes a pelvic exam is part of a regular checkup. In this case, you can do some things to make your test results as accurate as possible. · Try to schedule the exam when you don't have your period.  
· Don't use douches, tampons, or vaginal medicines, sprays, or powders for 24 hours before your exam. 
· Don't have sex for 24 hours before your exam. 
Other times, women have this kind of exam at any time of the month. This is because they have pelvic pain, bleeding, or discharge. Or they may have another pelvic problem. Before your exam, it's important to share some information with your doctor. For example, if you are a survivor of rape or sexual abuse, you can talk about any concerns you may have. Your doctor will also want to know if you are pregnant or use birth control. And he or she will want to hear about any problems, surgeries, or procedures you have had in your pelvic area. You will also need to tell your doctor when your last period was. Follow-up care is a key part of your treatment and safety. Be sure to make and go to all appointments, and call your doctor if you are having problems. It's also a good idea to know your test results and keep a list of the medicines you take. How is a pelvic exam done? · During a pelvic exam, you will: ¨ Take off your clothes below the waist. You will get a paper or cloth cover to put over the lower half of your body. Roman Adin on your back on an exam table. Your feet will be raised above you. Stirrups will support your feet. · The doctor will: ¨ Ask you to relax your knees. Your knees need to lean out, toward the walls. ¨ Check the opening of your vagina for sores or swelling. ¨ Gently put a tool called a speculum into your vagina. It opens the vagina a little bit. You will feel some pressure. But if you are relaxed, it will not hurt. It lets your doctor see inside the vagina. ¨ Use a small brush, spatula, or swab to get a sample of cells, if you are having a Pap test or culture. The doctor then removes the speculum. ¨ Put on gloves and put one or two fingers of one hand into your vagina. The other hand goes on your lower belly. This lets your doctor feel your pelvic organs. You will probably feel some pressure. Try to stay relaxed. ¨ Put one gloved finger into your rectum and one into your vagina, if needed. This can also help check your pelvic organs. This exam takes about 10 minutes. At the end, you will get a washcloth or tissue to clean your vaginal area. It's normal to have some discharge after this exam. You can then get dressed. Some test results may be ready right away. But results from a culture or a Pap test may take several days or a few weeks. Why should you have a pelvic exam? 
· You want to have recommended screening tests. This includes a Pap test. 
· You think you have a vaginal infection. Signs include itching, burning, or unusual discharge. · You might have been exposed to a sexually transmitted infection (STI), such as chlamydia or herpes. · You have vaginal bleeding that is not part of your normal menstrual period. · You have pain in your belly or pelvis. · You have been sexually assaulted. A pelvic exam lets your doctor collect evidence and check for STIs. · You are pregnant. · You are having trouble getting pregnant. What are the risks of a pelvic exam? 
There are no risks from a pelvic exam. 
When should you call for help? Watch closely for changes in your health, and be sure to contact your doctor if you have any problems. Where can you learn more? Go to http://george-rajiv.info/. Enter A812 in the search box to learn more about \"Pelvic Exam: Care Instructions. \" Current as of: October 13, 2016 Content Version: 11.4 © 3324-9642 [employer]. Care instructions adapted under license by [employer] (which disclaims liability or warranty for this information). If you have questions about a medical condition or this instruction, always ask your healthcare professional. [place] [address] any warranty or liability for your use of this information. Introducing [facility]! Dear Jorje [employer]: Thank you for requesting a [employer] account. Our records indicate that you have previously registered for a [employer] account but its currently inactive. Please call our [employer] support line at [phone]. Additional Information If you have questions, please visit the Frequently Asked Questions section of the [employer] website at [email or web address] Remember, [employer] is NOT to be used for urgent needs. For medical emergencies, dial 911. Now available from your iPhone and Android! Please provide this summary of care documentation to your next provider. Your primary care clinician is listed as Charlott Dancer. If you have any questions after today's visit, please call [phone].

## 2018-01-16 ENCOUNTER — HOSPITAL ENCOUNTER (OUTPATIENT)
Dept: PHYSICAL THERAPY | Age: 58
Discharge: HOME | End: 2018-01-16
Payer: COMMERCIAL

## 2018-01-16 LAB
BACTERIAL VAGINOSIS, NAA: ABNORMAL
CANDIDA ALBICANS, NAA, 180056: NEGATIVE
CANDIDA GLABRATA, NAA, 180057: NEGATIVE
CANDIDA KRUSEI, NAA, 180016: NEGATIVE
CHLAMYDIA TRACHOMATIS, NAA, 180097: NEGATIVE
NEISSERIA GONORRHOEAE, NAA, 180104: NEGATIVE
TRICH VAG BY NAA, 180087: NEGATIVE

## 2018-01-16 PROCEDURE — 97140 MANUAL THERAPY 1/> REGIONS: CPT | Performed by: PHYSICAL THERAPIST

## 2018-01-16 PROCEDURE — 97110 THERAPEUTIC EXERCISES: CPT | Performed by: PHYSICAL THERAPIST

## 2018-01-16 NOTE — PROGRESS NOTES
[address] [facility]  THERAPY  [facility] [address] [place], [address] - Phone: [phone]  Fax: [phone]  []  PROGRESS NOTE  [x]  DISCHARGE SUMMARY  Patient Name: Myla Wright : 1960   Treatment Diagnosis: Cervicalgia [M54.2]     Referral Source: Jaswinder Garza NP     Date of Initial Visit: 17 Attended Visits: 7 Missed Visits: 0     SUMMARY OF TREATMENT  Myla Wright has been seen at our clinic 2-3x/wk for a total of 7 visits. Pt treatment has consisted of NMR for postural awareness, therapeutic exercise for cervical ROM, strength, and manual therapy (MFR, deep tissue mobilizations and cervical/thoracic mobilizations). CURRENT STATUS  Pt had a good response to physical therapy treatment. She did not return for follow up scheduled. Will D/C at this time to HEP and follow up with MD.  Current status is unknown. RECOMMENDATIONS  Discontinue therapy due to lack of attendance or compliance. If you have any questions/comments please contact us directly at [phone]. Thank you for allowing us to assist in the care of your patient.     Therapist Signature: Risa Shipman PT Date: 17   Reporting Period: 17-17 Time: 2:36 PM

## 2018-01-16 NOTE — PROGRESS NOTES
[facility] PHYSICAL THERAPY - DAILY TREATMENT NOTE    Patient Name: Reinaldo Hirsch        Date: 2018  : 1960   yes Patient  Verified  Visit #:   2   of   9  Insurance: Payor: Cierra Gallardo / Plan: Lidya Price PPO / Product Type: PPO /      In time: 300 Out time: 335   Total Treatment Time: 35     Medicare Time Tracking (below)   Total Timed Codes (min):  na 1:1 Treatment Time:  na     TREATMENT AREA =  Neck pain [M54.2]    SUBJECTIVE  Pain Level (on 0 to 10 scale):  0  / 10   Medication Changes/New allergies or changes in medical history, any new surgeries or procedures?    no  If yes, update Summary List   Subjective Functional Status/Changes:  []  No changes reported     Nothing new since last visit          OBJECTIVE      15 min Therapeutic Exercise:  [x]  See flow sheet   Rationale:      increase ROM and increase strength to improve the patients ability to complete adls     20 min Manual Therapy: Sor, c1 L SG, c1 R PA mob, codmans , manual tx, dtm c/s paraspinals   Rationale:      decrease pain, increase ROM, increase tissue extensibility and decrease trigger points to improve patient's ability to complete adls         min Patient Education:  yes  Reviewed HEP   []  Progressed/Changed HEP based on:        Other Objective/Functional Measures:    Significant tenderness along L c2/3 and R c1 and B sor  Completed te as per flow sheet with cues required for appropriate scapular retraction      Post Treatment Pain Level (on 0 to 10) scale:   0  / 10     ASSESSMENT  Assessment/Changes in Function:     No increase in pain following initial te session      []  See Progress Note/Recertification   Patient will continue to benefit from skilled PT services to modify and progress therapeutic interventions, address functional mobility deficits, address ROM deficits, address strength deficits, analyze and address soft tissue restrictions, analyze and cue movement patterns, analyze and modify body mechanics/ergonomics, assess and modify postural abnormalities and instruct in home and community integration to attain remaining goals.    Progress toward goals / Updated goals:    Pt to return tomorrow so held on advancing hep     PLAN  []  Upgrade activities as tolerated yes Continue plan of care   []  Discharge due to :    []  Other:      Therapist: Paulina Amador PT    Date: 1/16/2018 Time: 4:07 PM     Future Appointments  Date Time Provider Jermaine Tucker   1/17/2018 3:00 PM Paulina Amador, PT [facility]   1/23/2018 2:30 PM Paulina Amador, PT [facility]   1/24/2018 2:30 PM Paulina Amador, PT [facility]   1/30/2018 3:00 PM Paulina Amador, PT [facility]   2/1/2018 2:30 PM Paulina Amador, PT [facility]   2/6/2018 3:00 PM Paulina Amador, PT [facility]   2/8/2018 2:30 PM Paulina Amador, PT [facility]   3/9/2018 3:00 PM Jermaine High

## 2018-01-17 ENCOUNTER — APPOINTMENT (OUTPATIENT)
Dept: PHYSICAL THERAPY | Age: 58
End: 2018-01-17
Payer: COMMERCIAL

## 2018-01-17 NOTE — PROGRESS NOTES
Subjective:   61 y.o. female for Well Woman Check. No LMP recorded. Patient is postmenopausal.    Social History: not sexually active. Pertinent past medical hstory: hyperlipidemia, hypothyroidism, vitamin D deficiency. Current Outpatient Prescriptions   Medication Sig Dispense Refill    ASCORBIC ACID/VITAMIN E/BIOTIN (HAIR, SKIN, NAILS WITH BIOTIN PO) Take  by mouth.  VIT A/VIT C/VIT E/ZINC/COPPER (PRESERVISION AREDS PO) Take  by mouth.  simvastatin (ZOCOR) 10 mg tablet Take 1 Tab by mouth nightly. 90 Tab 1    levothyroxine (SYNTHROID) 75 mcg tablet Take 1 Tab by mouth Daily (before breakfast). 90 Tab 1    CHOLECALCIFEROL, VITAMIN D3, (VITAMIN D3 PO) Take  by mouth.  omega-3 fatty acids (FISH OIL) cap 3,000 mg.      Glucosamine &Chondroit-MV-Min3 820-190-01-0.5 mg tab Take  by mouth.  Bifidobacterium Infantis (ALIGN) 4 mg cap Take  by mouth.  meloxicam (MOBIC) 15 mg tablet take 1 tablet by mouth once daily with food 30 Tab 2        ROS:  Feeling well. No dyspnea or chest pain on exertion. No abdominal pain, change in bowel habits, black or bloody stools. No urinary tract symptoms. GYN ROS: no breast pain or new or enlarging lumps on self exam, no discharge or pelvic pain. No neurological complaints. Objective:     Visit Vitals    /72    Pulse 68    Temp 97.2 °F (36.2 °C) (Oral)    Resp 16    Ht 5' 2.01\" (1.575 m)    Wt 143 lb (64.9 kg)    SpO2 98%    BMI 26.15 kg/m2     The patient appears well, alert, oriented x 3, in no distress. ENT normal.  Neck supple. No adenopathy or thyromegaly. Lungs are clear, good air entry, no wheezes, rhonchi or rales. S1 and S2 normal, no murmurs, regular rate and rhythm. Abdomen soft without tenderness, guarding, mass or organomegaly. Extremities show no edema, normal peripheral pulses.     BREAST EXAM: breasts appear normal, no suspicious masses, no skin or nipple changes or axillary nodes    PELVIC EXAM: normal external genitalia, vulva and vagina. Cervix is non erythematous and without lesions. No adnexal tenderness or palpable masses. Assessment/Plan:   well woman  mammogram  Repeat pap smear in 3-5 years unless otherwise indicated      ICD-10-CM ICD-9-CM    1. Well woman exam with routine gynecological exam Z01.419 V72.31 PAP IG, APTIMA HPV AND RFX 16/18,45 ([id number])      [facility] VAGINITIS PLUS      PAP IG, APTIMA HPV AND RFX 16/18,45 ([id number])      NUSWAB VAGINITIS PLUS   .

## 2018-01-18 LAB
HPV HIGH RISK, 507303: NEGATIVE
PAP IMAGE GUIDED, 8900296: NORMAL

## 2018-01-23 ENCOUNTER — TELEPHONE (OUTPATIENT)
Dept: FAMILY MEDICINE CLINIC | Age: 58
End: 2018-01-23

## 2018-01-23 ENCOUNTER — HOSPITAL ENCOUNTER (OUTPATIENT)
Dept: PHYSICAL THERAPY | Age: 58
Discharge: HOME | End: 2018-01-23
Payer: COMMERCIAL

## 2018-01-23 PROCEDURE — 97110 THERAPEUTIC EXERCISES: CPT | Performed by: PHYSICAL THERAPIST

## 2018-01-23 PROCEDURE — 97140 MANUAL THERAPY 1/> REGIONS: CPT | Performed by: PHYSICAL THERAPIST

## 2018-01-23 NOTE — PROGRESS NOTES
[facility] PHYSICAL [facility]ER[facility] - DAILY TREATMENT NOTE    Patient Name: Mervat Henderson        Date: 2018  : 1960   yes Patient  Verified  Visit #:   3   of   9  Insurance: Payor: Corinna Daugherty / Plan: Leticia Anaya PPO / Product Type: PPO /      In time: 228 Out time: 307   Total Treatment Time: 37     Medicare Time Tracking (below)   Total Timed Codes (min):  na 1:1 Treatment Time:  na     TREATMENT AREA =  Neck pain [M54.2]    SUBJECTIVE  Pain Level (on 0 to 10 scale):  0  / 10   Medication Changes/New allergies or changes in medical history, any new surgeries or procedures?    no  If yes, update Summary List   Subjective Functional Status/Changes:  []  No changes reported     I had a baby shower to go to yesterday and I had my head turned ot the left for 3 hours. By the end of that I had a ha and one again yesterday. OBJECTIVE      17 min Therapeutic Exercise:  [x]  See flow sheet   Rationale:      increase ROM and increase strength to improve the patients ability to complete adls     20 min Manual Therapy: Sor, c1 L SG, c1 R PA mob, codmans , manual tx, dtm c/s paraspinals   Rationale:      decrease pain, increase ROM, increase tissue extensibility and decrease trigger points to improve patient's ability to complete adls         min Patient Education:  yes  Reviewed HEP   []  Progressed/Changed HEP based on:        Other Objective/Functional Measures:    Increased soft tissue restrictions along L SOR and LS compared to previous session  Reduced R sb noted as well with L sided pain reported  Palpable trp throughout     Post Treatment Pain Level (on 0 to 10) scale:   0  / 10     ASSESSMENT  Assessment/Changes in Function:     Reviewed at length to avoid repetitive or sustain rotation especially to L  Denied any increase in s/s after session but advised to continue with current meds as prescribed by md      []  See Progress Note/Recertification   Patient will continue to benefit from skilled PT services to modify and progress therapeutic interventions, address functional mobility deficits, address ROM deficits, address strength deficits, analyze and address soft tissue restrictions, analyze and cue movement patterns, analyze and modify body mechanics/ergonomics, assess and modify postural abnormalities and instruct in home and community integration to attain remaining goals.    Progress toward goals / Updated goals:    Pt given hep - assess compliance     PLAN  []  Upgrade activities as tolerated yes Continue plan of care   []  Discharge due to :    []  Other:      Therapist: Sonia Robins PT    Date: 1/23/2018 Time: 4:07 PM     Future Appointments  Date Time Provider Jermaine Tucker   1/30/2018 3:00 PM Sonia Robins PT [facility]   2/1/2018 2:30 PM Sonia Robins PT [facility]   2/6/2018 3:00 PM Sonia Robins PT [facility]   2/8/2018 2:30 PM Sonia Robins, PT [facility]   3/9/2018 3:00 PM Jermaine Davila

## 2018-01-23 NOTE — TELEPHONE ENCOUNTER
----- Message from Julia Coyle [place] sent at 1/23/2018 12:40 AM EST -----  Please call pt to inform her of normal pap smear results and neg HPV

## 2018-01-24 ENCOUNTER — APPOINTMENT (OUTPATIENT)
Dept: PHYSICAL THERAPY | Age: 58
End: 2018-01-24
Payer: COMMERCIAL

## 2018-01-30 ENCOUNTER — HOSPITAL ENCOUNTER (OUTPATIENT)
Dept: PHYSICAL THERAPY | Age: 58
Discharge: HOME | End: 2018-01-30
Payer: COMMERCIAL

## 2018-01-30 PROCEDURE — 97140 MANUAL THERAPY 1/> REGIONS: CPT | Performed by: PHYSICAL THERAPIST

## 2018-01-30 PROCEDURE — 97110 THERAPEUTIC EXERCISES: CPT | Performed by: PHYSICAL THERAPIST

## 2018-01-30 NOTE — PROGRESS NOTES
[facility] PHYSICAL THERAPY - DAILY TREATMENT NOTE    Patient Name: Jovi Rodríguez        Date: 2018  : 1960   yes Patient  Verified  Visit #:   4   of   9  Insurance: Payor: Arden Poon / Plan: Belinda Radyolanda PPO / Product Type: PPO /      In time: 300 Out time: 400   Total Treatment Time: 60     Medicare Time Tracking (below)   Total Timed Codes (min):  na 1:1 Treatment Time:  na     TREATMENT AREA =  Neck pain [M54.2]    SUBJECTIVE  Pain Level (on 0 to 10 scale):  0  / 10   Medication Changes/New allergies or changes in medical history, any new surgeries or procedures?    no  If yes, update Summary List   Subjective Functional Status/Changes:  []  No changes reported     I felt fine after last time but over the last week I have had two really bad HA on that L side.  I have no idea why they just come on and I have to just sleep it off          OBJECTIVE  Modalities Rationale:     decrease pain and increase tissue extensibility to improve patient's ability to complete adls   min [] Estim, type/location:                                      []  att     []  unatt     []  w/US     []  w/ice    []  w/heat    min []  Mechanical Traction: type/lbs                   []  pro   []  sup   []  int   []  cont    []  before manual    []  after manual    min []  Ultrasound, settings/location:      min []  Iontophoresis w/ dexamethasone, location:                                               []  take home patch       []  in clinic   10 min []  Ice     [x]  Heat    location/position: Supine with bolster    min []  Vasopneumatic Device, press/temp:     min []  Other:    [x] Skin assessment post-treatment (if applicable):    [x]  intact    []  redness- no adverse reaction     []redness - adverse reaction:        25 min Therapeutic Exercise:  [x]  See flow sheet   Rationale:      increase ROM and increase strength to improve the patients ability to complete adls     25 min Manual Therapy: Mfr/stm c/s and t/s paraspinals, U, LS, SCM, L fist rib depression, B OA SG, L C1 PA mob c3-5 L upglide R downglide   Rationale:      decrease pain, increase ROM, increase tissue extensibility and decrease trigger points to improve patient's ability to complete adls         min Patient Education:  yes  Reviewed HEP   []  Progressed/Changed HEP based on: Other Objective/Functional Measures:    Pt arrived to session with reduced and painful RR/ext by 75%, L rotation 50%   Significant tenderness along L UT insertion at scapula   Reduced gross OA mobility     Post Treatment Pain Level (on 0 to 10) scale:   0  / 10     ASSESSMENT  Assessment/Changes in Function:     Significant increase in soft tissue tightness and reduction in arom all directions compared to previous session. Pt reports that only able to perform hep 1x/day at minimum - hep performed in clinic were done with poor form I - required constant cues to accurately perform      []  See Progress Note/Recertification   Patient will continue to benefit from skilled PT services to modify and progress therapeutic interventions, address functional mobility deficits, address ROM deficits, address strength deficits, analyze and address soft tissue restrictions, analyze and cue movement patterns, analyze and modify body mechanics/ergonomics, assess and modify postural abnormalities and instruct in home and community integration to attain remaining goals.    Progress toward goals / Updated goals:    Intermittent compliant with hep freqeuncy     PLAN  []  Upgrade activities as tolerated yes Continue plan of care   []  Discharge due to :    []  Other:      Therapist: Rafael Fields PT    Date: 1/30/2018 Time: 5:31 PM     Future Appointments  Date Time Provider Jemraine Tucker   2/6/2018 3:00 PM Rafael Fields PT [facility]   3/9/2018 3:00 PM Jermaine Hensley

## 2018-02-01 ENCOUNTER — APPOINTMENT (OUTPATIENT)
Dept: PHYSICAL THERAPY | Age: 58
End: 2018-02-01
Payer: COMMERCIAL

## 2018-02-06 ENCOUNTER — HOSPITAL ENCOUNTER (OUTPATIENT)
Dept: PHYSICAL THERAPY | Age: 58
Discharge: HOME | End: 2018-02-06
Payer: COMMERCIAL

## 2018-02-06 PROCEDURE — 97110 THERAPEUTIC EXERCISES: CPT | Performed by: PHYSICAL THERAPIST

## 2018-02-06 PROCEDURE — 97140 MANUAL THERAPY 1/> REGIONS: CPT | Performed by: PHYSICAL THERAPIST

## 2018-02-06 NOTE — PROGRESS NOTES
[facility] PHYSICAL THERAPY - DAILY TREATMENT NOTE    Patient Name: Lizzy Wells        Date: 2018  : 1960   yes Patient  Verified  Visit #:   5   of   9  Insurance: Payor: Johnny Fu / Plan: Saqib Reagan PPO / Product Type: PPO /      In time: 300 Out time: 350   Total Treatment Time: 50     Medicare Time Tracking (below)   Total Timed Codes (min):  na 1:1 Treatment Time:  na     TREATMENT AREA =  Neck pain [M54.2]    SUBJECTIVE  Pain Level (on 0 to 10 scale):  0  / 10   Medication Changes/New allergies or changes in medical history, any new surgeries or procedures?    no  If yes, update Summary List   Subjective Functional Status/Changes:  []  No changes reported     I have not had a ha since last time and my pain is much better.         OBJECTIVE  Modalities Rationale:     decrease pain and increase tissue extensibility to improve patient's ability to complete adls   min [] Estim, type/location:                                      []  att     []  unatt     []  w/US     []  w/ice    []  w/heat    min []  Mechanical Traction: type/lbs                   []  pro   []  sup   []  int   []  cont    []  before manual    []  after manual    min []  Ultrasound, settings/location:      min []  Iontophoresis w/ dexamethasone, location:                                               []  take home patch       []  in clinic   10 min []  Ice     [x]  Heat    location/position: Supine with bolster    min []  Vasopneumatic Device, press/temp:     min []  Other:    [x] Skin assessment post-treatment (if applicable):    [x]  intact    []  redness- no adverse reaction     []redness - adverse reaction:        25 min Therapeutic Exercise:  [x]  See flow sheet   Rationale:      increase ROM and increase strength to improve the patients ability to complete adls     15 min Manual Therapy: Mfr/stm c/s and t/s paraspinals, U, LS, SCM, L fist rib depression, B OA SG, L C1 PA mob c3-5 L upglide R downglide   Rationale:      decrease pain, increase ROM, increase tissue extensibility and decrease trigger points to improve patient's ability to complete adls         min Patient Education:  yes  Reviewed HEP   []  Progressed/Changed HEP based on: Other Objective/Functional Measures:    Reduction in muscular tightness compared to previous session. Continues with limited oa/aa mobility limiting ability to turn head and look up      Post Treatment Pain Level (on 0 to 10) scale:   0  / 10     ASSESSMENT  Assessment/Changes in Function:     Pt has had nearly one full week of reduction in pain and HA while still participating in normal routine     []  See Progress Note/Recertification   Patient will continue to benefit from skilled PT services to modify and progress therapeutic interventions, address functional mobility deficits, address ROM deficits, address strength deficits, analyze and address soft tissue restrictions, analyze and cue movement patterns, analyze and modify body mechanics/ergonomics, assess and modify postural abnormalities and instruct in home and community integration to attain remaining goals. Progress toward goals / Updated goals:    Steady progress towards goal in the last week.       PLAN  []  Upgrade activities as tolerated yes Continue plan of care   []  Discharge due to :    []  Other:      Therapist: Caitlin Stanley PT    Date: 2/6/2018 Time: 5:31 PM     Future Appointments  Date Time Provider Jermaine Tucker   2/12/2018 3:00 PM Caitlin Stanley, PT [facility]   2/15/2018 4:00 PM Caitlin Stanley, PT [facility]   2/20/2018 4:00 PM Caitlin Stanley PT [facility]   2/22/2018 4:00 PM Caitlin Stanley, PT [facility]   2/26/2018 3:00 PM Caitlin Stanley, PT [facility]   2/28/2018 3:00 PM Caitlin Stanley PT [facility]   3/9/2018 3:00 PM Jermaine Joy

## 2018-02-08 ENCOUNTER — APPOINTMENT (OUTPATIENT)
Dept: PHYSICAL THERAPY | Age: 58
End: 2018-02-08
Payer: COMMERCIAL

## 2018-02-12 ENCOUNTER — HOSPITAL ENCOUNTER (OUTPATIENT)
Dept: PHYSICAL THERAPY | Age: 58
Discharge: HOME | End: 2018-02-12
Payer: COMMERCIAL

## 2018-02-12 PROCEDURE — 97140 MANUAL THERAPY 1/> REGIONS: CPT | Performed by: PHYSICAL THERAPIST

## 2018-02-12 PROCEDURE — 97110 THERAPEUTIC EXERCISES: CPT | Performed by: PHYSICAL THERAPIST

## 2018-02-12 NOTE — PROGRESS NOTES
[facility]   PHYSICAL THERAPY - DAILY TREATMENT NOTE    Patient Name: Yony Kessler        Date: 2018  : 1960   yes Patient  Verified  Visit #:   6   of   9  Insurance: Payor: Tash Solorio / Plan: Kaveh Benitez PPO / Product Type: PPO /      In time: 300 Out time: 405   Total Treatment Time: 60     Medicare Time Tracking (below)   Total Timed Codes (min):  na 1:1 Treatment Time:  na     TREATMENT AREA =  Neck pain [M54.2]    SUBJECTIVE  Pain Level (on 0 to 10 scale):  0  / 10   Medication Changes/New allergies or changes in medical history, any new surgeries or procedures?    no  If yes, update Summary List   Subjective Functional Status/Changes:  []  No changes reported     See pn       OBJECTIVE  Modalities Rationale:     decrease pain and increase tissue extensibility to improve patient's ability to complete adls   min [] Estim, type/location:                                      []  att     []  unatt     []  w/US     []  w/ice    []  w/heat    min []  Mechanical Traction: type/lbs                   []  pro   []  sup   []  int   []  cont    []  before manual    []  after manual    min []  Ultrasound, settings/location:      min []  Iontophoresis w/ dexamethasone, location:                                               []  take home patch       []  in clinic   10 min []  Ice     [x]  Heat    location/position: Supine with bolster    min []  Vasopneumatic Device, press/temp:     min []  Other:    [x] Skin assessment post-treatment (if applicable):    [x]  intact    []  redness- no adverse reaction     []redness - adverse reaction:        30 min Therapeutic Exercise:  [x]  See flow sheet   Rationale:      increase ROM and increase strength to improve the patients ability to complete adls     20 min Manual Therapy: Mfr/stm c/s and t/s paraspinals, U, LS, SCM, L fist rib depression, B OA SG, L C1 PA mob c3-5 L upglide R downglide   Rationale:      decrease pain, increase ROM, increase tissue extensibility and decrease trigger points to improve patient's ability to complete adls         min Patient Education:  yes  Reviewed HEP   []  Progressed/Changed HEP based on: Other Objective/Functional Measures:    See   pn     Post Treatment Pain Level (on 0 to 10) scale:   0  / 10     ASSESSMENT  Assessment/Changes in Function:   See pn     []  See Progress Note/Recertification   Patient will continue to benefit from skilled PT services to modify and progress therapeutic interventions, address functional mobility deficits, address ROM deficits, address strength deficits, analyze and address soft tissue restrictions, analyze and cue movement patterns, analyze and modify body mechanics/ergonomics, assess and modify postural abnormalities and instruct in home and community integration to attain remaining goals.    Progress toward goals / Updated goals:    seepn     PLAN  []  Upgrade activities as tolerated yes Continue plan of care   []  Discharge due to :    []  Other:      Therapist: Terrance Templeton PT    Date: 2/12/2018 Time: 5:31 PM     Future Appointments  Date Time Provider Jermaine Tucker   2/20/2018 4:00 PM Terrance Templeton PT [facility]   2/22/2018 4:00 PM Terrance Templeton PT [facility]   2/26/2018 3:00 PM Terrance Templeton PT [facility]   2/28/2018 3:00 PM Terrance Templeton, PT [facility]   3/9/2018 3:00 PM Jermaine Tobias

## 2018-02-12 NOTE — PROGRESS NOTES
.[facility] [address] THERAPY  [address] [place] [address],[facility], [facility] - Phone: [phone]  Fax: [phone]  PROGRESS NOTE  Patient Name: Syed Mabry : 1960   Treatment/Medical Diagnosis: Neck pain [M54.2]   Referral Source: Amarjit Perez NP     Date of Initial Visit: 18 Attended Visits: 6 Missed Visits: 2     SUMMARY OF TREATMENT  Pt was seen for IE and 5 f/u visits with treatment consisting of therapeutic exercises for cervical/thoracic mobility and sarah-scapular strengthening, manual therapy (prom/mobs/stm) and modalities prn. In addition pt was instructed on hep and activity modification. CURRENT STATUS  Pt has made good progress with PT over the last month. She has increased her cervical ext and l rotation to full but still continues to lack full cervical right rotation by 25%. Her AA mobility as improved but is still limited with PA on R which is a contributing factor to SOR tightness. Due to the progress gained in just 5 visits would like to continue with therapy an additional month to address goals below    Goal/Measure of Progress Goal Met? 1. Pt will increase FOTO by 5 points in order to show functional improvement   Status at last Eval: 65/100 Current Status: 63/100 progressing   2. Pt will note daily max pain </=4/10 in order to increase participation in adls   Status at last Eval: 9/10 Current Status: 6/10 progressing   3. Pt will report 40% reduction in s/s (including HA) in order to increase participation in adls   Status at last Eval: na Current Status: 60% yes     New Goals to be achieved in __4__  weeks:  1. Cont as above  2. Pt will note + 5 on GROC in order to show functional improvement  3. RECOMMENDATIONS  Cont PT an additional 2x/4 weeks   If you have any questions/comments please contact us directly at [phone]. Thank you for allowing us to assist in the care of your patient.     Therapist Signature: Phuong Pac, PT, DPT, CMT Date: 2/12/2018     Time: 12:37 PM   NOTE TO PHYSICIAN:  PLEASE COMPLETE THE ORDERS BELOW AND FAX TO   [facility]: [phone]  If you are unable to process this request in 24 hours please contact our office: [phone]    ___ I have read the above report and request that my patient continue as recommended.   ___ I have read the above report and request that my patient continue therapy with the following changes/special instructions:_________________________________________________________   ___ I have read the above report and request that my patient be discharged from therapy.      Physician Signature:        Date:       Time:

## 2018-02-14 ENCOUNTER — CLINICAL SUPPORT (OUTPATIENT)
Dept: FAMILY MEDICINE CLINIC | Age: 58
End: 2018-02-14

## 2018-02-14 VITALS
SYSTOLIC BLOOD PRESSURE: 101 MMHG | RESPIRATION RATE: 16 BRPM | OXYGEN SATURATION: 98 % | HEART RATE: 73 BPM | TEMPERATURE: 98 F | DIASTOLIC BLOOD PRESSURE: 69 MMHG

## 2018-02-14 DIAGNOSIS — Z23 ENCOUNTER FOR IMMUNIZATION: Primary | ICD-10-CM

## 2018-02-14 NOTE — PROGRESS NOTES
Galina Rg is a 61 y.o. female who presents for routine immunizations. She denies any symptoms , reactions or allergies that would exclude them from being immunized today. Risks and adverse reactions were discussed and the VIS was given to them. All questions were addressed. She was observed for 5 min post injection. There were no reactions observed.     Juanita Hughes LPN

## 2018-02-15 ENCOUNTER — APPOINTMENT (OUTPATIENT)
Dept: PHYSICAL THERAPY | Age: 58
End: 2018-02-15
Payer: COMMERCIAL

## 2018-02-20 ENCOUNTER — HOSPITAL ENCOUNTER (OUTPATIENT)
Dept: PHYSICAL THERAPY | Age: 58
Discharge: HOME | End: 2018-02-20
Payer: COMMERCIAL

## 2018-02-20 PROCEDURE — 97110 THERAPEUTIC EXERCISES: CPT | Performed by: PHYSICAL THERAPIST

## 2018-02-20 PROCEDURE — 97140 MANUAL THERAPY 1/> REGIONS: CPT | Performed by: PHYSICAL THERAPIST

## 2018-02-20 NOTE — PROGRESS NOTES
[facility] PHYSICAL THERAPY - DAILY TREATMENT NOTE    Patient Name: Milagros Orozco        Date: 2018  : 1960   yes Patient  Verified  Visit #:     Insurance: Payor: Keila Faria / Plan: Jennifer Marie PPO / Product Type: PPO /      In time: 400 Out time: 455   Total Treatment Time: 55     Medicare Time Tracking (below)   Total Timed Codes (min):  na 1:1 Treatment Time:  na     TREATMENT AREA =  Neck pain [M54.2]    SUBJECTIVE  Pain Level (on 0 to 10 scale):  0  / 10   Medication Changes/New allergies or changes in medical history, any new surgeries or procedures?    no  If yes, update Summary List   Subjective Functional Status/Changes:  []  No changes reported   I was sore after the exercises when I had to lay my stomach but overall my pain is much better and I have had not had a ha since last time.  The big thing I am having trouble with is turning my head while I am driving       OBJECTIVE  Modalities Rationale:     time constraint to improve patient's ability to complete adls   min [] Estim, type/location:                                      []  att     []  unatt     []  w/US     []  w/ice    []  w/heat    min []  Mechanical Traction: type/lbs                   []  pro   []  sup   []  int   []  cont    []  before manual    []  after manual    min []  Ultrasound, settings/location:      min []  Iontophoresis w/ dexamethasone, location:                                               []  take home patch       []  in clinic    min []  Ice     []  Heat    location/position:     min []  Vasopneumatic Device, press/temp:     min []  Other:    [] Skin assessment post-treatment (if applicable):    []  intact    []  redness- no adverse reaction     []redness - adverse reaction:        35 min Therapeutic Exercise:  [x]  See flow sheet   Rationale:      increase ROM and increase strength to improve the patients ability to complete adls     20 min Manual Therapy: Mfr/stm c/s and t/s paraspinals, U, LS, SCM, L fist rib depression, B OA SG, L C1 PA mob c3-5 L upglide R downglide   Rationale:      decrease pain, increase ROM, increase tissue extensibility and decrease trigger points to improve patient's ability to complete adls         min Patient Education:  yes  Reviewed HEP   []  Progressed/Changed HEP based on: Other Objective/Functional Measures:  Arrived to session with reduced RR by 50% with pain on R at end range - reduced A aa pa mobility with palpable trp at R mid ut muscle belly   Modified t and y to standing without UT compensation or pain reported       Post Treatment Pain Level (on 0 to 10) scale:   0  / 10     ASSESSMENT  Assessment/Changes in Function:   Continues to report significant reduction in pain and Ha with regular adl participation      []  See Progress Note/Recertification   Patient will continue to benefit from skilled PT services to modify and progress therapeutic interventions, address functional mobility deficits, address ROM deficits, address strength deficits, analyze and address soft tissue restrictions, analyze and cue movement patterns, analyze and modify body mechanics/ergonomics, assess and modify postural abnormalities and instruct in home and community integration to attain remaining goals.    Progress toward goals / Updated goals:  Steady progress towards consistent pain reduction       PLAN  []  Upgrade activities as tolerated yes Continue plan of care   []  Discharge due to :    []  Other:      Therapist: Dania Montalvo PT    Date: 2/20/2018 Time: 5:31 PM     Future Appointments  Date Time Provider Jermaine Tucker   2/20/2018 4:00 PM [address] [place] [address] [address]   2/22/2018 4:00 PM Dania Montalvo PT [facility]   2/26/2018 3:00 PM Dania Montalvo PT [facility]   2/28/2018 3:00 PM [address] [place] [address] [address]   3/9/2018 3:00 PM Jermaine Duffy

## 2018-02-21 NOTE — PROGRESS NOTES
KALINA Garnica   [place] [address], [place] [address],[facility], [address] [facility] - Phone: [phone]  Fax: [phone]  Request for use of Dry Needling/Intramuscular Manual Therapy  Patient Name: Yana Morales : 1960   Treatment/Medical Diagnosis: Neck pain [M54.2]   Referral Source: Poli Guevara NP     Date of Initial Visit: 18 Attended Visits: 7 Missed Visits: 2     Based on findings from the physical therapy examination and evaluation, the evaluating therapist believes the patient, Yana Morales would benefit from including Dry Needling as part of the plan of care. Dry needling is an effective treatment technique utilized in conjunction with other physical therapy interventions to inactivate myofascial trigger points and the pain and dysfunction they cause. It involves the use of a very fine (usually 0.3 mm/30 gauge) solid filament sterile needle (also used for acupuncture) which is inserted into the skin and directly into a myofascial trigger point. Repeated strokes or movements of the needle without completely withdrawing it help to inactive the trigger point, all of which may take approximately 30 - 60 seconds at each site. Benefits include inactivation of trigger points, decreased pain, increased muscle length, improved movement patterns, and restoration of function. Potential risks include the following: post-needling soreness, infection, bruising/bleeding, penetration of a nerve, and pneumothorax. All treating therapist have been thoroughly educated in ways to avoid the adverse reactions. Dry Needling is an advanced procedure that requires additional training including greater than 54 hours of intensive course work.  Most treatment programs will consist of one session of dry needling each week and a possible second treatment to consist of muscle re-education, flexibility, strengthening and other manual techniques to facilitate the benefits from the dry needling therapy. If you agree with this recommendation, please sign the attached prescription form and fax it to us at [phone]. If you have questions or concerns regarding dry needling or any other treatment we may be providing, please contact us at [phone]. Thank you for allowing us to assist in the care of your patient. Therapist Signature: Eliazar Miranda, PT, DPT, CMT Date: 2/21/2018     Time: 7:22 AM   NOTE TO PHYSICIAN:  PLEASE COMPLETE THE ORDERS BELOW AND FAX TO   [facility]: [phone]  If you are unable to process this request in 24 hours please contact our office: [phone] I have read the above request and AGREE to the recommendation of including dry needling as part of the plan of care. ? I have read the above request and DO NOT AGREE to including dry needling as part of the plan of care.    ? I have read the above report and request that my patient continue therapy with the following changes/special instructions: _________________________________________________     Physician Signature:        Date:       Time:

## 2018-02-22 ENCOUNTER — APPOINTMENT (OUTPATIENT)
Dept: PHYSICAL THERAPY | Age: 58
End: 2018-02-22
Payer: COMMERCIAL

## 2018-02-26 ENCOUNTER — HOSPITAL ENCOUNTER (OUTPATIENT)
Dept: PHYSICAL THERAPY | Age: 58
Discharge: HOME | End: 2018-02-26
Payer: COMMERCIAL

## 2018-02-26 PROCEDURE — 97140 MANUAL THERAPY 1/> REGIONS: CPT | Performed by: PHYSICAL THERAPIST

## 2018-02-26 PROCEDURE — 97110 THERAPEUTIC EXERCISES: CPT | Performed by: PHYSICAL THERAPIST

## 2018-02-26 NOTE — PROGRESS NOTES
[facility] PHYSICAL THERAPY - DAILY TREATMENT NOTE    Patient Name: Deepak Rice        Date: 2018  : 1960   yes Patient  Verified  Visit #:     Insurance: Payor: Shahzad Pope / Plan: [place] / Product Type: PPO /      In time: 300 Out time: 355   Total Treatment Time: 55     Medicare Time Tracking (below)   Total Timed Codes (min):  na 1:1 Treatment Time:  na     TREATMENT AREA =  Neck pain [M54.2]    SUBJECTIVE  Pain Level (on 0 to 10 scale):  0  / 10   Medication Changes/New allergies or changes in medical history, any new surgeries or procedures?    no  If yes, update Summary List   Subjective Functional Status/Changes:  []  No changes reported   I have had no pain and no HA for last week. It has been a good week.         OBJECTIVE  Modalities Rationale:     increase tissue extensibility to improve patient's ability to complete adls   min [] Estim, type/location:                                      []  att     []  unatt     []  w/US     []  w/ice    []  w/heat    min []  Mechanical Traction: type/lbs                   []  pro   []  sup   []  int   []  cont    []  before manual    []  after manual    min []  Ultrasound, settings/location:      min []  Iontophoresis w/ dexamethasone, location:                                               []  take home patch       []  in clinic   10 min []  Ice     [x]  Heat    location/position: Supine with bolster     min []  Vasopneumatic Device, press/temp:     min []  Other:    [x] Skin assessment post-treatment (if applicable):    [x]  intact    []  redness- no adverse reaction     []redness - adverse reaction:        30 min Therapeutic Exercise:  [x]  See flow sheet   Rationale:      increase ROM and increase strength to improve the patients ability to complete adls     15 min Manual Therapy: Mfr/stm c/s and t/s paraspinals, U, LS, SCM, L fist rib depression, B OA SG, L C1 PA mob c3-5 L upglide R downglide   Rationale:      decrease pain, increase ROM, increase tissue extensibility and decrease trigger points to improve patient's ability to complete adls         min Patient Education:  yes  Reviewed HEP   []  Progressed/Changed HEP based on: Other Objective/Functional Measures:    ttp along R c/s paraspinals but denied any sx with sor  Still continues with reduced rotation      Post Treatment Pain Level (on 0 to 10) scale:   0  / 10     ASSESSMENT  Assessment/Changes in Function:   Continues to have difficulty turning head while driving     []  See Progress Note/Recertification   Patient will continue to benefit from skilled PT services to modify and progress therapeutic interventions, address functional mobility deficits, address ROM deficits, address strength deficits, analyze and address soft tissue restrictions, analyze and cue movement patterns, analyze and modify body mechanics/ergonomics, assess and modify postural abnormalities and instruct in home and community integration to attain remaining goals.    Progress toward goals / Updated goals:  Excellent progress towards all goals        PLAN  []  Upgrade activities as tolerated yes Continue plan of care   []  Discharge due to :    []  Other:      Therapist: Raquel Miranda PT    Date: 2/26/2018 Time: 5:31 PM     Future Appointments  Date Time Provider Jermaine Tucker   2/26/2018 3:00 PM Raquel Miranda PT [facility]   2/28/2018 3:00 PM Raquel Miranda PT [facility]   3/9/2018 3:00 PM Jermaine Walton

## 2018-02-28 ENCOUNTER — HOSPITAL ENCOUNTER (OUTPATIENT)
Dept: PHYSICAL THERAPY | Age: 58
Discharge: HOME | End: 2018-02-28
Payer: COMMERCIAL

## 2018-02-28 PROCEDURE — 97140 MANUAL THERAPY 1/> REGIONS: CPT | Performed by: PHYSICAL THERAPIST

## 2018-02-28 PROCEDURE — 97110 THERAPEUTIC EXERCISES: CPT | Performed by: PHYSICAL THERAPIST

## 2018-02-28 NOTE — PROGRESS NOTES
[facility] PHYSICAL THERAPY - DAILY TREATMENT NOTE    Patient Name: Ani Balbuena        Date: 2018  : 1960   yes Patient  Verified  Visit #:     Insurance: Payor: Tomasz Postal / Plan: [place] [employer] / Product Type: PPO /      In time: 300 Out time: 355   Total Treatment Time: 55     Medicare Time Tracking (below)   Total Timed Codes (min):  na 1:1 Treatment Time:  na     TREATMENT AREA =  Neck pain [M54.2]    SUBJECTIVE  Pain Level (on 0 to 10 scale):  2  / 10   Medication Changes/New allergies or changes in medical history, any new surgeries or procedures?    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  No ha      OBJECTIVE  Modalities Rationale:     increase tissue extensibility to improve patient's ability to complete adls   min [] Estim, type/location:                                      []  att     []  unatt     []  w/US     []  w/ice    []  w/heat    min []  Mechanical Traction: type/lbs                   []  pro   []  sup   []  int   []  cont    []  before manual    []  after manual    min []  Ultrasound, settings/location:      min []  Iontophoresis w/ dexamethasone, location:                                               []  take home patch       []  in clinic   10 min []  Ice     [x]  Heat    location/position: Supine with bolster     min []  Vasopneumatic Device, press/temp:     min []  Other:    [x] Skin assessment post-treatment (if applicable):    [x]  intact    []  redness- no adverse reaction     []redness - adverse reaction:        25 min Therapeutic Exercise:  [x]  See flow sheet   Rationale:      increase ROM and increase strength to improve the patients ability to complete adls     20 min Manual Therapy: Mfr/stm c/s and t/s paraspinals, SOR with L rotation, R first rib depression, R UT/LS stretch B OA SG, r c2-4 upglides    Rationale:      decrease pain, increase ROM, increase tissue extensibility and decrease trigger points to improve patient's ability to complete adls         min Patient Education:  yes  Reviewed HEP   []  Progressed/Changed HEP based on: Other Objective/Functional Measures:  Arrived to clinic with reduced L rotation and pain reported along R c/s region. Required increased time with STM to this area to reduce. Denied HA. Noted reduction in pain at end of session        Post Treatment Pain Level (on 0 to 10) scale:   0  / 10     ASSESSMENT  Assessment/Changes in Function:   Rotational adls still seem to exacerbate pain     []  See Progress Note/Recertification   Patient will continue to benefit from skilled PT services to modify and progress therapeutic interventions, address functional mobility deficits, address ROM deficits, address strength deficits, analyze and address soft tissue restrictions, analyze and cue movement patterns, analyze and modify body mechanics/ergonomics, assess and modify postural abnormalities and instruct in home and community integration to attain remaining goals.    Progress toward goals / Updated goals:    Steady progress with consistent pain reduction      PLAN  []  Upgrade activities as tolerated yes Continue plan of care   []  Discharge due to :    []  Other:      Therapist: Bishop Rinne, PT    Date: 2/28/2018 Time: 5:31 PM     Future Appointments  Date Time Provider Jermaine Tucker   2/28/2018 3:00 PM Bishop Rinne, PT [facility]   3/8/2018 3:00 PM Bishop Rinne, PT [facility]   3/9/2018 3:00 PM NAOMI Ascencio [address] [facility]   3/12/2018 3:00 PM Bishop Rinne, PT [facility]   3/20/2018 2:30 PM Bishop Rinne, PT [facility]   3/26/2018 3:00 PM Bishop Rinne, PT [facility]

## 2018-03-08 ENCOUNTER — HOSPITAL ENCOUNTER (OUTPATIENT)
Dept: PHYSICAL THERAPY | Age: 58
Discharge: HOME | End: 2018-03-08
Payer: COMMERCIAL

## 2018-03-08 PROCEDURE — 97110 THERAPEUTIC EXERCISES: CPT | Performed by: PHYSICAL THERAPIST

## 2018-03-08 PROCEDURE — 97140 MANUAL THERAPY 1/> REGIONS: CPT | Performed by: PHYSICAL THERAPIST

## 2018-03-08 PROCEDURE — 97012 MECHANICAL TRACTION THERAPY: CPT | Performed by: PHYSICAL THERAPIST

## 2018-03-08 NOTE — PROGRESS NOTES
[facility] PHYSICAL THERAPY - DAILY TREATMENT NOTE    Patient Name: Marciano Clause        Date: 3/8/2018  : 1960   yes Patient  Verified  Visit #:   10   of   13  Insurance: Payor: Yazan Morocho / Plan: [place] [employer]PO / Product Type: PPO /      In time: 305 Out time: 410   Total Treatment Time: 65     Medicare Time Tracking (below)   Total Timed Codes (min):  na 1:1 Treatment Time:  na     TREATMENT AREA =  Neck pain [M54.2]    SUBJECTIVE  Pain Level (on 0 to 10 scale):  1  / 10   Medication Changes/New allergies or changes in medical history, any new surgeries or procedures?    no  If yes, update Summary List   Subjective Functional Status/Changes:  []  No changes reported     I had some injections yesterday by a different pain management and it feels pretty good.            OBJECTIVE  Modalities Rationale:     decrease inflammation, decrease pain and increase tissue extensibility to improve patient's ability to turn head   min [] Estim, type/location:                                      []  att     []  unatt     []  w/US     []  w/ice    []  w/heat   10 min [x]  Mechanical Traction: type/lbs 16 on 60\"/ 0\" for 20\"                   []  pro   []  sup   [x]  int   []  cont    []  before manual    [x]  after manual    min []  Ultrasound, settings/location:      min []  Iontophoresis w/ dexamethasone, location:                                               []  take home patch       []  in clinic    min []  Ice     []  Heat    location/position:     min []  Vasopneumatic Device, press/temp:     min []  Other:    [] Skin assessment post-treatment (if applicable):    []  intact    []  redness- no adverse reaction     []redness - adverse reaction:        30 min Therapeutic Exercise:  [x]  See flow sheet   Rationale:      increase ROM and increase strength to improve the patients ability to complete adls     20 min Manual Therapy: dtm c/s paraspinals, ut, ls, scm and scalenes, sor, R first rib depression, ut stretch Rationale:      decrease pain, increase ROM, increase tissue extensibility and decrease trigger points to improve patient's ability to complete adls     min Patient Education:  yes  Reviewed HEP   []  Progressed/Changed HEP based on: Other Objective/Functional Measures:    Still continues with limited rotation and ext by 25% - denies c/o but during ext pt reports \"neck feels like it cannot support the weight of my head\" - due to above sx added in tx      Post Treatment Pain Level (on 0 to 10) scale:   0  / 10     ASSESSMENT  Assessment/Changes in Function:     Left clinic without sx     []  See Progress Note/Recertification   Patient will continue to benefit from skilled PT services to modify and progress therapeutic interventions, address functional mobility deficits, address ROM deficits, address strength deficits, analyze and address soft tissue restrictions, analyze and cue movement patterns, analyze and modify body mechanics/ergonomics, assess and modify postural abnormalities and instruct in home and community integration to attain remaining goals.    Progress toward goals / Updated goals:    Steady progress towards consistent pain reduction     PLAN  []  Upgrade activities as tolerated yes Continue plan of care   []  Discharge due to :    []  Other:      Therapist: Ani Parrish PT    Date: 3/8/2018 Time: 5:43 PM     Future Appointments  Date Time Provider Jermaine Tucker   3/9/2018 3:00 PM NAOMI Mazariegos [address] [facility]   3/12/2018 3:00 PM Ani Parrish PT [facility]   3/20/2018 2:30 PM Ani Parrish PT [facility]   3/26/2018 3:00 PM Ani Parrish PT [facility]

## 2018-03-09 ENCOUNTER — OFFICE VISIT (OUTPATIENT)
Dept: FAMILY MEDICINE CLINIC | Age: 58
End: 2018-03-09

## 2018-03-09 VITALS — OXYGEN SATURATION: 98 % | WEIGHT: 142 LBS | HEIGHT: 62 IN | RESPIRATION RATE: 16 BRPM | BODY MASS INDEX: 26.13 KG/M2

## 2018-03-09 DIAGNOSIS — Z00.00 PHYSICAL EXAM, ANNUAL: Primary | ICD-10-CM

## 2018-03-09 NOTE — PROGRESS NOTES
Mat Pardo is a 60 y.o. female presents in office to be seen for physical.    There are no preventive care reminders to display for this patient. 1. Have you been to the ER, urgent care clinic since your last visit? Hospitalized since your last visit?no    2. Have you seen or consulted any other health care providers outside of the [address] [place] [address] since your last visit? Include any pap smears or colon screening.  no

## 2018-03-09 NOTE — MR AVS SNAPSHOT
[address] [address] 
[phone] Patient: Gerson Guardado MRN: [id number]:[id number] Visit Information Date & Time Provider Department Dept. Phone Encounter #  
 3/9/2018  3:00 PM Mehran Grier, [address] [facility] [phone] [id number] Upcoming Health Maintenance Date Due  
 BREAST CANCER SCRN MAMMOGRAM 6/20/2019 PAP AKA CERVICAL CYTOLOGY 1/12/2021 COLONOSCOPY 4/12/2027 DTaP/Tdap/Td series (2 - Td) 2/14/2028 Allergies as of 3/9/2018  Review Complete On: 3/9/2018 By: NAOMI Duffy No Known Allergies Current Immunizations  Reviewed on 2/14/2018 Name Date Influenza Vaccine 11/20/2014 Influenza Vaccine (Quad) PF 9/22/2017, 12/12/2016 Tdap 2/14/2018 Zoster Vaccine, Live 11/20/2014 Not reviewed this visit You Were Diagnosed With   
  
 Codes Comments Physical exam, annual    -  Primary ICD-10-CM: Z00.00 ICD-9-CM: V70.0 Vitals BP Pulse Temp Resp Height(growth percentile) Weight(growth percentile) (P) 116/66 (P) 60 (P) 96.3 °F (35.7 °C) (Oral) 16 5' 2.01\" (1.575 m) 142 lb (64.4 kg) SpO2 BMI OB Status Smoking Status 98% 25.97 kg/m2 Postmenopausal Former Smoker BMI and BSA Data Body Mass Index Body Surface Area  
 25.97 kg/m 2 1.68 m 2 Preferred Pharmacy Pharmacy Name Phone [address] [phone] Your Updated Medication List  
  
   
This list is accurate as of 3/9/18 11:59 PM.  Always use your most recent med list.  
  
  
  
  
 Darlene Mendenhall Generic drug:  omega-3 fatty acids 3,000 mg.  
  
 Glucosamine &Chondroit-MV-Min3 [phone] mg Tab Take  by mouth. HAIR, SKIN, NAILS WITH BIOTIN PO Take  by mouth.  
  
 levothyroxine 75 mcg tablet Commonly known as:  SYNTHROID  
 Take 1 Tab by mouth Daily (before breakfast). PRESERVISION AREDS PO Take  by mouth. simvastatin 10 mg tablet Commonly known as:  ZOCOR Take 1 Tab by mouth nightly. VITAMIN D3 PO Take  by mouth. We Performed the Following CBC W/O DIFF [26897 CPT(R)] LIPID PANEL [58203 CPT(R)] METABOLIC PANEL, COMPREHENSIVE [08063 CPT(R)] REFERRAL TO DERMATOLOGY [REF19 Custom] Comments:  
 Please evaluate patient for chronic intermittent scalp itching TSH 3RD GENERATION [49167 CPT(R)] To-Do List   
 03/09/2018 Imaging:  [facility] SCREENING INCL CAD   
  
 03/10/2018 Lab:  CBC W/O DIFF   
  
 03/10/2018 Lab:  LIPID PANEL   
  
 03/10/2018 Lab:  METABOLIC PANEL, COMPREHENSIVE   
  
 03/10/2018 Lab:  TSH 3RD GENERATION   
  
 03/12/2018 3:00 PM  
  Appointment with Shania Castillo PT at [address] ([phone])  
  
 03/20/2018 2:30 PM  
  Appointment with Shania Castillo PT at [address] ([phone])  
  
 03/26/2018 3:00 PM  
  Appointment with Shania Castillo PT at [address] [place] [address] ([phone]) Referral Information Referral ID Referred By Referred To  
  
 [id number] Georgi Dacia Not Available Visits Status Start Date End Date 1 New Request 3/9/18 3/9/19 If your referral has a status of pending review or denied, additional information will be sent to support the outcome of this decision. Introducing [facility]! Dear Clare Cruz: Thank you for requesting a [employer] account. Our records indicate that you have previously registered for a brick[employer]mobile account but its currently inactive. Please call our brick[employer]mobile support line at [phone]. Additional Information If you have questions, please visit the Frequently Asked Questions section of the [employer]mobile website at [email or web address] Remember, [employer] is NOT to be used for urgent needs.  For medical emergencies, dial 911. Now available from your iPhone and Android! Please provide this summary of care documentation to your next provider. Your primary care clinician is listed as Mik Randle. If you have any questions after today's visit, please call [phone].

## 2018-03-10 LAB
A-G RATIO,AGRAT: 1.8 RATIO (ref 1.1–2.6)
ALBUMIN SERPL-MCNC: 4.4 G/DL (ref 3.5–5)
ALP SERPL-CCNC: 61 U/L (ref 25–115)
ALT SERPL-CCNC: 14 U/L (ref 5–40)
ANION GAP SERPL CALC-SCNC: 15 MMOL/L
AST SERPL W P-5'-P-CCNC: 12 U/L (ref 10–37)
BILIRUB SERPL-MCNC: 0.3 MG/DL (ref 0.2–1.2)
BUN SERPL-MCNC: 13 MG/DL (ref 6–22)
CALCIUM SERPL-MCNC: 9.5 MG/DL (ref 8.4–10.5)
CHLORIDE SERPL-SCNC: 103 MMOL/L (ref 98–110)
CHOLEST SERPL-MCNC: 215 MG/DL (ref 110–200)
CO2 SERPL-SCNC: 24 MMOL/L (ref 20–32)
CREAT SERPL-MCNC: 0.5 MG/DL (ref 0.5–1.2)
ERYTHROCYTE [DISTWIDTH] IN BLOOD BY AUTOMATED COUNT: 12.8 % (ref 10–16)
GFRAA, 66117: >60
GFRNA, 66118: >60
GLOBULIN,GLOB: 2.5 G/DL (ref 2–4)
GLUCOSE SERPL-MCNC: 112 MG/DL (ref 70–99)
HCT VFR BLD AUTO: 40.3 % (ref 35.1–48)
HDLC SERPL-MCNC: 3.5 MG/DL (ref 0–5)
HDLC SERPL-MCNC: 61 MG/DL (ref 40–59)
HGB BLD-MCNC: 13.5 G/DL (ref 11.7–16)
LDLC SERPL CALC-MCNC: 135 MG/DL (ref 50–99)
MCH RBC QN AUTO: 34 PG (ref 26–34)
MCHC RBC AUTO-ENTMCNC: 34 G/DL (ref 32–36)
MCV RBC AUTO: 100 FL (ref 80–95)
PLATELET # BLD AUTO: 246 K/UL (ref 140–440)
PMV BLD AUTO: 11.9 FL (ref 6–10.8)
POTASSIUM SERPL-SCNC: 4.4 MMOL/L (ref 3.5–5.5)
PROT SERPL-MCNC: 6.9 G/DL (ref 6.4–8.3)
RBC # BLD AUTO: 4.03 M/UL (ref 3.8–5.2)
SODIUM SERPL-SCNC: 142 MMOL/L (ref 133–145)
TRIGL SERPL-MCNC: 95 MG/DL (ref 40–149)
TSH SERPL DL<=0.005 MIU/L-ACNC: 0.54 MCU/ML (ref 0.27–4.2)
VLDLC SERPL CALC-MCNC: 19 MG/DL (ref 8–30)
WBC # BLD AUTO: 7.3 K/UL (ref 4–11)

## 2018-03-12 ENCOUNTER — HOSPITAL ENCOUNTER (OUTPATIENT)
Dept: PHYSICAL THERAPY | Age: 58
Discharge: HOME | End: 2018-03-12
Payer: COMMERCIAL

## 2018-03-12 PROCEDURE — 97012 MECHANICAL TRACTION THERAPY: CPT | Performed by: PHYSICAL THERAPIST

## 2018-03-12 PROCEDURE — 97140 MANUAL THERAPY 1/> REGIONS: CPT | Performed by: PHYSICAL THERAPIST

## 2018-03-12 PROCEDURE — 97110 THERAPEUTIC EXERCISES: CPT | Performed by: PHYSICAL THERAPIST

## 2018-03-12 NOTE — PROGRESS NOTES
[facility]   PHYSICAL THERAPY - DAILY TREATMENT NOTE    Patient Name: Joi Scott        Date: 3/12/2018  : 1960   yes Patient  Verified  Visit #:     Insurance: Payor: Chuyita Willoughby / Plan: [place] PPO / Product Type: PPO /      In time: 300 Out time: 405   Total Treatment Time: 60     Medicare Time Tracking (below)   Total Timed Codes (min):  na 1:1 Treatment Time:  na     TREATMENT AREA =  Neck pain [M54.2]    SUBJECTIVE  Pain Level (on 0 to 10 scale):  0  / 10   Medication Changes/New allergies or changes in medical history, any new surgeries or procedures?    no  If yes, update Summary List   Subjective Functional Status/Changes:  []  No changes reported     See pn          OBJECTIVE  Modalities Rationale:     decrease inflammation, decrease pain and increase tissue extensibility to improve patient's ability to complete adls   min [] Estim, type/location:                                      []  att     []  unatt     []  w/US     []  w/ice    []  w/heat   10 min [x]  Mechanical Traction: type/lbs 16# 60\", 20# 20\"                  []  pro   []  sup   [x]  int   []  cont    []  before manual    [x]  after manual    min []  Ultrasound, settings/location:      min []  Iontophoresis w/ dexamethasone, location:                                               []  take home patch       []  in clinic    min []  Ice     [x]  Heat    location/position: Supine with bolster during tx    min []  Vasopneumatic Device, press/temp:     min []  Other:    [x] Skin assessment post-treatment (if applicable):    []  intact    [x]  redness- no adverse reaction     []redness - adverse reaction:        30 min Therapeutic Exercise:  [x]  See flow sheet   Rationale:      increase ROM and increase strength to improve the patients ability to compete adls     15 min Manual Therapy: dtm c/s paraspinals, scm, scalenes, sor, grade I-ii pa mobs c5-7   Rationale:      decrease pain, increase ROM, increase tissue extensibility and decrease trigger points to improve patient's ability to complete adls        min Patient Education:  yes  Reviewed HEP   []  Progressed/Changed HEP based on: Other Objective/Functional Measures:    See pn     Post Treatment Pain Level (on 0 to 10) scale:   0  / 10     ASSESSMENT  Assessment/Changes in Function:     See pn     []  See Progress Note/Recertification   Patient will continue to benefit from skilled PT services to modify and progress therapeutic interventions, address functional mobility deficits, address ROM deficits, address strength deficits, analyze and address soft tissue restrictions, analyze and cue movement patterns, analyze and modify body mechanics/ergonomics, assess and modify postural abnormalities and instruct in home and community integration to attain remaining goals.    Progress toward goals / Updated goals:    See pn     PLAN  []  Upgrade activities as tolerated yes Continue plan of care   []  Discharge due to :    []  Other:      Therapist: Silvia Adorno PT    Date: 3/12/2018 Time: 3:07 PM     Future Appointments  Date Time Provider Jermaine Tucker   3/20/2018 2:30 PM Silvia Adorno PT [facility]   3/26/2018 3:00 PM Silvia Adorno PT [facility]

## 2018-03-12 NOTE — PROGRESS NOTES
.[address] THERAPY  [address] [place] [address], [facility], [address] [facility] - Phone: [phone]  Fax: [phone]  PROGRESS NOTE  Patient Name: Joi Scott : 1960   Treatment/Medical Diagnosis: Neck pain [M54.2]   Referral Source: Meme Fuentes NP     Date of Initial Visit: 18 Attended Visits: 11 Missed Visits: 1     SUMMARY OF TREATMENT  Pt was seen for IE And 10 f/u sessions with treatment consisting of therapeutic exercises for cervical/thoracic mobility and sarah-scapular strengthening, manual therapy (prom/mobs/stm), modalities including mechanical tx and education on hep/activity modification. CURRENT STATUS  Pt has made good progress with PT thus far. Rates pain 2/10 max at end of day and has denied a HA in 3 weeks. We have recently added in mechanical tx and this in addition to [facility] has helped improved her rom. She has 2 more weeks of PT schedule and believe she would benefit from attendance to ensure independence with advanced hep    Goal/Measure of Progress Goal Met? 1. Pt will increase FOTO by 5 points in order to show functional improvement   Status at last Eval: 63/100 Current Status: 70/100 yes   2. Pt will note daily max pain </=4/10 in order to increase participation in adls   Status at last Eval: 6/10 Current Status: 2/10 yes   3. Pt will note +5 on GROC in order to show functional improvement   Status at last Eval: na Current Status: +5 yes     New Goals to be achieved in __2__  weeks:  1. Pt will be I with advanced hep in order to prepare for dc  2. Pt will be able to self manage sx in order to prepare for dc  3. RECOMMENDATIONS  Cont therapy 1-2x/2 weeks  If you have any questions/comments please contact us directly at [phone]. Thank you for allowing us to assist in the care of your patient.     Therapist Signature: Theodora Devlin, PT, DPT, CMT Date: 3/12/2018     Time: 4:09 PM   NOTE TO PHYSICIAN:  PLEASE COMPLETE THE ORDERS BELOW AND FAX TO   [facility] Physical Therapy: [phone]  If you are unable to process this request in 24 hours please contact our office: [phone]    ___ I have read the above report and request that my patient continue as recommended.   ___ I have read the above report and request that my patient continue therapy with the following changes/special instructions:_________________________________________________________   ___ I have read the above report and request that my patient be discharged from therapy.      Physician Signature:        Date:       Time:

## 2018-03-20 ENCOUNTER — APPOINTMENT (OUTPATIENT)
Dept: PHYSICAL THERAPY | Age: 58
End: 2018-03-20
Payer: COMMERCIAL

## 2018-03-21 NOTE — PROGRESS NOTES
History of Present Illness  Patient presents for complete physical and has no complaints. Denies any pertinent PMH, not on any prescription daily medications, nor had any recent illnesses. Denies any CP or SOB with physical activity. Past Medical History:   Diagnosis Date    Macular degeneration      Current Outpatient Prescriptions on File Prior to Visit   Medication Sig Dispense Refill    ASCORBIC ACID/VITAMIN E/BIOTIN (HAIR, SKIN, NAILS WITH BIOTIN PO) Take  by mouth.  VIT A/VIT C/VIT E/ZINC/COPPER (PRESERVISION AREDS PO) Take  by mouth.  simvastatin (ZOCOR) 10 mg tablet Take 1 Tab by mouth nightly. 90 Tab 1    levothyroxine (SYNTHROID) 75 mcg tablet Take 1 Tab by mouth Daily (before breakfast). 90 Tab 1    CHOLECALCIFEROL, VITAMIN D3, (VITAMIN D3 PO) Take  by mouth.  omega-3 fatty acids (FISH OIL) cap 3,000 mg.      Glucosamine &Chondroit-MV-Min3 968-151-02-0.5 mg tab Take  by mouth. No current facility-administered medications on file prior to visit.       No Known Allergies    Visit Vitals    BP (P) 116/66    Pulse (P) 60    Temp (P) 96.3 °F (35.7 °C) (Oral)    Resp 16    Ht 5' 2.01\" (1.575 m)    Wt 142 lb (64.4 kg)    SpO2 98%    BMI 25.97 kg/m2       Review of Systems   History obtained from the patient  General: negative for - chills, fatigue or fever  Psychological: negative for - anxiety or depression  Ophthalmic: negative for - double vision, photophobia or uses glasses  ENT: negative for - epistaxis, headaches, vertigo or visual changes  Hematological and Lymphatic: negative for - bleeding problems, night sweats or swollen lymph nodes  Endocrine: negative for - breast changes, malaise/lethargy, palpitations or polydipsia/polyuria  Respiratory: no cough, shortness of breath, or wheezing  Cardiovascular: no chest pain or dyspnea on exertion  Gastrointestinal: no abdominal pain, change in bowel habits  Genito-Urinary: no dysuria, trouble voiding, or hematuria  Musculoskeletal: negative for - muscular weakness  Dermatological: negative for - eczema or rash    Physical Exam  General appearance - alert, well appearing, and in no distress  Mental status - alert, oriented to person, place, and time  Eyes - pupils equal and reactive, extraocular eye movements intact  Ears - bilateral TM's and external ear canals normal  Nose - normal and patent, no erythema, discharge or polyps  Mouth - mucous membranes moist, pharynx normal without lesions  Neck - supple, no significant adenopathy  Chest - clear to auscultation, no wheezes, rales or rhonchi, symmetric air entry  Heart - normal rate, regular rhythm, normal S1, S2, no murmurs, rubs, clicks or gallops  Abdomen - soft, nontender, nondistended, no masses or organomegaly  Neurological - alert, oriented, normal speech  Musculoskeletal - no joint tenderness, deformity or swelling, DTRs 2/3 bialterally  Extremities - peripheral pulses normal, no pedal edema, no clubbing or cyanosis  Skin - normal coloration and turgor, no rashes, no suspicious skin lesions noted    Assessment and Plan  Patient is an excellent health for her age. No abnormalities on exam.  Labs ordered and drawn today.

## 2018-03-26 ENCOUNTER — HOSPITAL ENCOUNTER (OUTPATIENT)
Dept: PHYSICAL THERAPY | Age: 58
Discharge: HOME | End: 2018-03-26
Payer: COMMERCIAL

## 2018-03-26 PROCEDURE — 97140 MANUAL THERAPY 1/> REGIONS: CPT | Performed by: PHYSICAL THERAPIST

## 2018-03-26 PROCEDURE — 97110 THERAPEUTIC EXERCISES: CPT | Performed by: PHYSICAL THERAPIST

## 2018-03-26 NOTE — PROGRESS NOTES
[facility] - DAILY TREATMENT NOTE    Patient Name: Nereida Olivarez        Date: 3/26/2018  : 1960   yes Patient  Verified  Visit #:     Insurance: Payor: Debora Valencia / Plan: [place] [employer] [place] / Product Type: PPO /      In time: 300 Out time: 351   Total Treatment Time: 50     Medicare Time Tracking (below)   Total Timed Codes (min):  na 1:1 Treatment Time:  na     TREATMENT AREA =  Neck pain [M54.2]    SUBJECTIVE  Pain Level (on 0 to 10 scale):  0  / 10   Medication Changes/New allergies or changes in medical history, any new surgeries or procedures?    no  If yes, update Summary List   Subjective Functional Status/Changes:  []  No changes reported     See dc        OBJECTIVE      30 min Therapeutic Exercise:  [x]  See flow sheet   Rationale:      increase ROM and increase strength to improve the patients ability to compete adls     20 min Manual Therapy: dtm c/s paraspinals, scm, scalenes, sor, grade I-ii pa mobs c4-7, B shoulder girdle depression, manual tx with L SB   Rationale:      decrease pain, increase ROM, increase tissue extensibility and decrease trigger points to improve patient's ability to complete adls        min Patient Education:  yes  Reviewed HEP   []  Progressed/Changed HEP based on: Other Objective/Functional Measures:    See dc     Post Treatment Pain Level (on 0 to 10) scale:   0  / 10     ASSESSMENT  Assessment/Changes in Function:     See dc     []  See Progress Note/Recertification   Patient will continue to benefit from skilled PT services to see dc   Progress toward goals / Updated goals:    See dc     PLAN  []  Upgrade activities as tolerated no Continue plan of care   [x]  Discharge due to : Goals achieved reports 90% improvement in s/s    []  Other:      Therapist: Ani Parrish, PT    Date: 3/26/2018 Time: 3:07 PM     No future appointments.

## 2018-04-04 DIAGNOSIS — E03.9 ACQUIRED HYPOTHYROIDISM: Primary | ICD-10-CM

## 2018-04-04 RX ORDER — LEVOTHYROXINE SODIUM 75 UG/1
75 TABLET ORAL
Qty: 90 TAB | Refills: 0 | Status: SHIPPED | OUTPATIENT
Start: 2018-04-04 | End: 2019-08-01 | Stop reason: SDUPTHER

## 2018-04-04 NOTE — PROGRESS NOTES
Please call pt to inform her that her lipid panel was overall good. The LDL was elevated as well as the HDL, which is a balance. The total chol was elevated, but not by much and glucose slightly elevated. Advise to repeat labs in 3 months as well as A1c. Encourage to eat low chol diet and eat plenty of veggies. Also, inform her that tsh level was normal, so continue current dose of synthroid. Prescription will be faxed to pharmacy.

## 2018-04-05 DIAGNOSIS — E78.5 HYPERLIPIDEMIA, UNSPECIFIED HYPERLIPIDEMIA TYPE: ICD-10-CM

## 2018-04-05 RX ORDER — SIMVASTATIN 10 MG/1
10 TABLET, FILM COATED ORAL
Qty: 90 TAB | Refills: 1 | Status: CANCELLED | OUTPATIENT
Start: 2018-04-05

## 2018-04-05 NOTE — TELEPHONE ENCOUNTER
Pt calling to request medication refill of:    Requested Prescriptions     Pending Prescriptions Disp Refills    simvastatin (ZOCOR) 10 mg tablet 90 Tab 1     Sig: Take 1 Tab by mouth nightly. be sent to [facility] on [address]. Pt has about  2 weeks remaining    Pts last appt was 3/9/18  Advised pt of 72 hour time frame for refill requests. Please advise.

## 2018-04-10 NOTE — TELEPHONE ENCOUNTER
----- [facility] at 4/4/2018  5:44 PM EDT -----  Please call pt to inform her that her lipid panel was overall good. The LDL was elevated as well as the HDL, which is a balance. The total chol was elevated, but not by much and glucose slightly elevated. Advise to repeat labs in 3 months as well as A1c. Encourage to eat low chol diet and eat plenty of veggies. Also, inform her that tsh level was normal, so continue current dose of synthroid. Prescription will be faxed to pharmacy.

## 2018-04-14 DIAGNOSIS — E78.5 HYPERLIPIDEMIA, UNSPECIFIED HYPERLIPIDEMIA TYPE: ICD-10-CM

## 2018-04-14 RX ORDER — SIMVASTATIN 10 MG/1
TABLET, FILM COATED ORAL
Qty: 90 TAB | Refills: 1 | Status: SHIPPED | OUTPATIENT
Start: 2018-04-14 | End: 2018-07-16 | Stop reason: SDUPTHER

## 2018-05-15 NOTE — PROGRESS NOTES
.[place] [address] [facility] [address]  THERAPY  [address] [facility] 201,[facility], 70 [facility] - Phone: [phone]  Fax: [phone]  DISCHARGE  NOTE  Patient Name: Doris Palomares : 1960   Treatment/Medical Diagnosis: Neck pain [M54.2]   Referral Source: Emily Whitaker NP     Date of Initial Visit: 18 Attended Visits: 12 Missed Visits: 1     SUMMARY OF TREATMENT  Pt was seen for IE And 11 f/u sessions with treatment consisting of therapeutic exercises for cervical/thoracic mobility and sarah-scapular strengthening, manual therapy (prom/mobs/stm), modalities including mechanical tx and education on hep/activity modification. CURRENT STATUS  Pt max pain 2/10 with all adls. She has restore cervical and thoracic rom/mobility wfl all directions. Goal/Measure of Progress Goal Met? 1. Pt will be I with advanced hep in order to prepare for dc   Status at last Eval: na Current Status:  yes   2. Pt will be able to self manage sx in order to prepare for dc    Status at last Eval: na Current Status: na yes                  RECOMMENDATIONS  D/c with hep   If you have any questions/comments please contact us directly at [phone]. Thank you for allowing us to assist in the care of your patient. Therapist Signature: Judy Brown, PT, DPT, [facility] Date: 5/15/2018     Time: 4:09 PM   NOTE TO PHYSICIAN:  PLEASE COMPLETE THE ORDERS BELOW AND FAX TO   [facility] Physical Therapy: [phone]  If you are unable to process this request in 24 hours please contact our office: [phone]    ___ I have read the above report and request that my patient continue as recommended.   ___ I have read the above report and request that my patient continue therapy with the following changes/special instructions:_________________________________________________________   ___ I have read the above report and request that my patient be discharged from therapy.      Physician Signature:        Date:       Time:

## 2018-06-28 ENCOUNTER — TELEPHONE (OUTPATIENT)
Dept: FAMILY MEDICINE CLINIC | Age: 58
End: 2018-06-28

## 2018-06-28 DIAGNOSIS — R73.09 ELEVATED GLUCOSE: ICD-10-CM

## 2018-06-28 DIAGNOSIS — E78.5 ELEVATED LIPIDS: Primary | ICD-10-CM

## 2018-06-28 NOTE — TELEPHONE ENCOUNTER
Pt calling to stating that she was told to return in 6 months for lab work but to call to make sure the lab work was ordered prior to coming in. No lab work currently ordered and no f/u appointment scheduled.      LOV 3/09/18    Please advise

## 2018-06-28 NOTE — TELEPHONE ENCOUNTER
Please call pt to inform her that labs are ordered and to make an appointment for a few days beforehand to get them done.

## 2018-07-01 LAB
AVG GLU, 10930: 105 MG/DL (ref 91–123)
CHOLEST SERPL-MCNC: 198 MG/DL (ref 110–200)
HBA1C MFR BLD HPLC: 5.3 % (ref 4.8–5.9)
HDLC SERPL-MCNC: 2.9 MG/DL (ref 0–5)
HDLC SERPL-MCNC: 69 MG/DL (ref 40–59)
LDLC SERPL CALC-MCNC: 116 MG/DL (ref 50–99)
TRIGL SERPL-MCNC: 65 MG/DL (ref 40–149)
VLDLC SERPL CALC-MCNC: 13 MG/DL (ref 8–30)

## 2018-07-16 ENCOUNTER — TELEPHONE (OUTPATIENT)
Dept: FAMILY MEDICINE CLINIC | Age: 58
End: 2018-07-16

## 2018-07-16 DIAGNOSIS — E78.5 HYPERLIPIDEMIA, UNSPECIFIED HYPERLIPIDEMIA TYPE: ICD-10-CM

## 2018-07-16 DIAGNOSIS — E03.9 HYPOTHYROIDISM (ACQUIRED): ICD-10-CM

## 2018-07-16 NOTE — TELEPHONE ENCOUNTER
Pt was inquiring about lab results. A1C was in range, LDL was slightly elevated. Pt requested refills on her medication. Please advise.

## 2018-07-18 NOTE — PROGRESS NOTES
Please call pt to inform her htat her HDL level is above normal, which is great. Her LDL is elevated, but it is lower than previous levels. So there is a balance between the two, indicating better overall cholesterol. Continue to eat healthy and stay active. Her A1C is in a great range also.

## 2018-07-19 RX ORDER — SIMVASTATIN 10 MG/1
TABLET, FILM COATED ORAL
Qty: 90 TAB | Refills: 1 | Status: SHIPPED | OUTPATIENT
Start: 2018-07-19 | End: 2019-02-25 | Stop reason: SDUPTHER

## 2018-07-19 RX ORDER — LEVOTHYROXINE SODIUM 75 UG/1
75 TABLET ORAL
Qty: 90 TAB | Refills: 1 | Status: SHIPPED | OUTPATIENT
Start: 2018-07-19 | End: 2019-01-09 | Stop reason: SDUPTHER

## 2018-08-20 ENCOUNTER — OFFICE VISIT (OUTPATIENT)
Dept: FAMILY MEDICINE CLINIC | Age: 58
End: 2018-08-20

## 2018-08-20 VITALS
TEMPERATURE: 97.1 F | WEIGHT: 141 LBS | DIASTOLIC BLOOD PRESSURE: 64 MMHG | SYSTOLIC BLOOD PRESSURE: 95 MMHG | BODY MASS INDEX: 25.95 KG/M2 | OXYGEN SATURATION: 96 % | HEART RATE: 76 BPM | HEIGHT: 62 IN | RESPIRATION RATE: 18 BRPM

## 2018-08-20 DIAGNOSIS — J06.9 URTI (ACUTE UPPER RESPIRATORY INFECTION): Primary | ICD-10-CM

## 2018-08-20 NOTE — PROGRESS NOTES
Maura Key is a 60 y.o. female (: 1960) presenting to address:    Chief Complaint   Patient presents with    Cold Symptoms     x4 days       Vitals:    18 1321   BP: 95/64   Pulse: 76   Resp: 18   Temp: 97.1 °F (36.2 °C)   TempSrc: Oral   SpO2: 96%   Weight: 141 lb (64 kg)   Height: 5' 2.01\" (1.575 m)   PainSc:   0 - No pain       Hearing/Vision:   No exam data present    Learning Assessment:     Learning Assessment 2015   PRIMARY LEARNER Patient   PRIMARY LANGUAGE ENGLISH   LEARNER PREFERENCE PRIMARY DEMONSTRATION   ANSWERED BY Patient   RELATIONSHIP SELF     Depression Screening:     PHQ over the last two weeks 2018   Little interest or pleasure in doing things Not at all   Feeling down, depressed, irritable, or hopeless Not at all   Total Score PHQ 2 0     Fall Risk Assessment:     Fall Risk Assessment, last 12 mths 2018   Able to walk? Yes   Fall in past 12 months? No     Abuse Screening:     Abuse Screening Questionnaire 2018   Do you ever feel afraid of your partner? N   Are you in a relationship with someone who physically or mentally threatens you? N   Is it safe for you to go home? Y     Coordination of Care Questionaire:   1. Have you been to the ER, urgent care clinic since your last visit? Hospitalized since your last visit? NO    2. Have you seen or consulted any other health care providers outside of the [facility] since your last visit? Include any pap smears or colon screening.  NO

## 2018-08-20 NOTE — PROGRESS NOTES
Dea Alpers     Chief Complaint   Patient presents with   Jossy Huang Symptoms     x4 days     Vitals:    08/20/18 1321   BP: 95/64   Pulse: 76   Resp: 18   Temp: 97.1 °F (36.2 °C)   TempSrc: Oral   SpO2: 96%   Weight: 141 lb (64 kg)   Height: 5' 2.01\" (1.575 m)   PainSc:   0 - No pain         HPI patient is here today complaining about cold symptoms for the last 4 days, she has been feeling tired fatigue, chills, no high fever. She had runny nose and cough mild   sore throat, no difficulty swallowing. She has some headache some dizziness and sinus pressure. She has been using DayQuil and NyQuil to relieve her symptoms. No sick contacts. No headache no nausea vomiting, no abdominal pain no diarrhea no chest pain or shortness of breath. Past Medical History:   Diagnosis Date    Macular degeneration      No past surgical history on file. Social History   Substance Use Topics    Smoking status: Former Smoker     Years: 10.00     Types: Cigarettes     Quit date: 11/19/1995    Smokeless tobacco: Never Used    Alcohol use Yes      Comment: occassionally       No family history on file. Review of Systems   Constitutional: Positive for malaise/fatigue. Negative for chills, diaphoresis, fever and weight loss. HENT: Positive for sinus pain and sore throat. Negative for congestion, ear discharge, ear pain, hearing loss, nosebleeds and tinnitus. Eyes: Negative for blurred vision, double vision, photophobia and pain. Respiratory: Positive for cough. Negative for hemoptysis, sputum production, shortness of breath and wheezing. Cardiovascular: Negative for chest pain, palpitations and leg swelling. Gastrointestinal: Negative for abdominal pain, constipation, nausea and vomiting. Genitourinary: Negative for dysuria, frequency and urgency. Musculoskeletal: Positive for myalgias. Negative for back pain, joint pain and neck pain. Skin: Negative for itching and rash.    Neurological: Positive for dizziness and headaches. Negative for tingling, sensory change, speech change, focal weakness and weakness. Psychiatric/Behavioral: Negative for depression, hallucinations, memory loss, substance abuse and suicidal ideas. The patient is not nervous/anxious and does not have insomnia. Physical Exam   Constitutional: She is oriented to person, place, and time. She appears well-developed and well-nourished. No distress. HENT:   Head: Normocephalic and atraumatic. Mouth/Throat: Oropharynx is clear and moist. No oropharyngeal exudate. Eyes: Conjunctivae are normal. Pupils are equal, round, and reactive to light. Right eye exhibits no discharge. Left eye exhibits no discharge. No scleral icterus. Neck: Normal range of motion. Neck supple. No thyromegaly present. Cardiovascular: Normal rate, regular rhythm and normal heart sounds. No murmur heard. Pulmonary/Chest: Effort normal and breath sounds normal. No respiratory distress. She has no wheezes. She has no rales. She exhibits no tenderness. Abdominal: Soft. Bowel sounds are normal. She exhibits no distension. There is no tenderness. There is no rebound. Musculoskeletal: Normal range of motion. She exhibits no edema, tenderness or deformity. Lymphadenopathy:     She has no cervical adenopathy. Neurological: She is alert and oriented to person, place, and time. No cranial nerve deficit. Coordination normal.   Skin: Skin is warm and dry. No rash noted. She is not diaphoretic. No erythema. No pallor. Psychiatric: She has a normal mood and affect. Her behavior is normal. Judgment and thought content normal.        Assessment and plan     1. URTI (acute upper respiratory infection)  Viral URTI, supportive care continue over-the-counter medication, increase fluid intake, home remedies honey, natural vitamin C. Return to clinic if there is no improvement or her symptoms are worse.     Current Outpatient Prescriptions   Medication Sig Dispense Refill    simvastatin (ZOCOR) 10 mg tablet take 1 tablet by mouth NIGHTLY 90 Tab 1    levothyroxine (SYNTHROID) 75 mcg tablet Take 1 Tab by mouth Daily (before breakfast). 90 Tab 1    ASCORBIC ACID/VITAMIN E/BIOTIN (HAIR, SKIN, NAILS WITH BIOTIN PO) Take  by mouth.  VIT A/VIT C/VIT E/ZINC/COPPER (PRESERVISION AREDS PO) Take  by mouth.  CHOLECALCIFEROL, VITAMIN D3, (VITAMIN D3 PO) Take  by mouth.  omega-3 fatty acids (FISH OIL) cap 3,000 mg.      Glucosamine &Chondroit-MV-Min3 018-879-80-0.5 mg tab Take  by mouth. Patient Active Problem List    Diagnosis Date Noted    Vitamin D deficiency 01/29/2016    Vitamin B12 deficiency 01/29/2016    Insomnia 01/29/2016    Hyperlipidemia 06/04/2015    Hypothyroidism 06/04/2015     Results for orders placed or performed in visit on 06/28/18   LIPID PANEL   Result Value Ref Range    Triglyceride 65 40 - 149 mg/dL    HDL Cholesterol 69 (H) 40 - 59 mg/dL    Cholesterol, total 198 110 - 200 mg/dL    CHOLESTEROL/HDL 2.9 0.0 - 5.0    LDL, calculated 116 (H) 50 - 99 mg/dL    VLDL, calculated 13 8 - 30 mg/dL   HEMOGLOBIN A1C W/O EAG   Result Value Ref Range    Hemoglobin A1c 5.3 4.8 - 5.9 %    AVG  91 - 123 mg/dL     Orders Only on 06/28/2018   Component Date Value Ref Range Status    Triglyceride 06/30/2018 65  40 - 149 mg/dL Final    HDL Cholesterol 06/30/2018 69* 40 - 59 mg/dL Final    Cholesterol, total 06/30/2018 198  110 - 200 mg/dL Final    CHOLESTEROL/HDL 06/30/2018 2.9  0.0 - 5.0 Final    LDL, calculated 06/30/2018 116* 50 - 99 mg/dL Final    VLDL, calculated 06/30/2018 13  8 - 30 mg/dL Final    Comment: Test includes cholesterol, HDL cholesterol, triglycerides and LDL. Cholesterol Recommended NCEP guidelines in mg/dL:  Less than 200            Desirable  200 - 239                Borderline High  Greater than or  = 240   High  Please Note:  Total Chol/HDL Ratio                   Men     Women  1/2 Avg.  Risk    3.4     3.3      Avg. Risk    5.0     4.4  2X  Avg. Risk    9.6     7.1  3X  Avg. Risk   23.4    11.0      Hemoglobin A1c 06/30/2018 5.3  4.8 - 5.9 % Final    AVG GLU 06/30/2018 105  91 - 123 mg/dL Final          Follow-up Disposition:  Return if symptoms worsen or fail to improve.

## 2018-08-29 DIAGNOSIS — Z00.00 PHYSICAL EXAM, ANNUAL: ICD-10-CM

## 2019-01-09 DIAGNOSIS — E03.9 HYPOTHYROIDISM (ACQUIRED): ICD-10-CM

## 2019-01-09 NOTE — TELEPHONE ENCOUNTER
Pharmacy faxed a refill request for,  Requested Prescriptions     Pending Prescriptions Disp Refills    levothyroxine (SYNTHROID) 75 mcg tablet 90 Tab 1     Sig: Take 1 Tab by mouth Daily (before breakfast).      Last office visit 8/20/18,  No f/u scheduled    Please advise

## 2019-01-10 RX ORDER — LEVOTHYROXINE SODIUM 75 UG/1
75 TABLET ORAL
Qty: 90 TAB | Refills: 0 | Status: SHIPPED | OUTPATIENT
Start: 2019-01-10 | End: 2019-03-21 | Stop reason: SDUPTHER

## 2019-01-19 NOTE — TELEPHONE ENCOUNTER
Current Outpatient Medications   Medication Sig    levothyroxine (SYNTHROID) 75 mcg tablet Take 1 Tab by mouth Daily (before breakfast). Closing Encounter. Refill already completed. Last office visit  8/20/2018Venus Bustamante  No future appointments.

## 2019-02-25 DIAGNOSIS — E78.5 HYPERLIPIDEMIA, UNSPECIFIED HYPERLIPIDEMIA TYPE: ICD-10-CM

## 2019-02-25 NOTE — TELEPHONE ENCOUNTER
Requested Prescriptions     Pending Prescriptions Disp Refills    simvastatin (ZOCOR) 10 mg tablet 90 Tab 1     Sig: take 1 tablet by mouth NIGHTLY     [facility] [address] [facility] [address]      They have 1  tablets remaining. Pts last appt was 8/20/18, Next appt is scheduled for 03/21/19. Pt aware of 72 hours time frame. Pt has scheduled an [facility] care appt. with [facility] 3/21.

## 2019-02-28 RX ORDER — SIMVASTATIN 10 MG/1
TABLET, FILM COATED ORAL
Qty: 90 TAB | Refills: 1 | Status: SHIPPED | OUTPATIENT
Start: 2019-02-28 | End: 2019-03-21 | Stop reason: SDUPTHER

## 2019-03-21 ENCOUNTER — OFFICE VISIT (OUTPATIENT)
Dept: FAMILY MEDICINE CLINIC | Age: 59
End: 2019-03-21

## 2019-03-21 VITALS
TEMPERATURE: 98.2 F | HEIGHT: 62 IN | DIASTOLIC BLOOD PRESSURE: 70 MMHG | SYSTOLIC BLOOD PRESSURE: 105 MMHG | HEART RATE: 62 BPM | RESPIRATION RATE: 17 BRPM | OXYGEN SATURATION: 98 % | WEIGHT: 157 LBS | BODY MASS INDEX: 28.89 KG/M2

## 2019-03-21 DIAGNOSIS — E03.9 HYPOTHYROIDISM (ACQUIRED): ICD-10-CM

## 2019-03-21 DIAGNOSIS — E78.5 HYPERLIPIDEMIA, UNSPECIFIED HYPERLIPIDEMIA TYPE: ICD-10-CM

## 2019-03-21 LAB
A-G RATIO,AGRAT: 2.1 RATIO (ref 1.1–2.6)
ALBUMIN SERPL-MCNC: 4.6 G/DL (ref 3.5–5)
ALP SERPL-CCNC: 72 U/L (ref 40–120)
ALT SERPL-CCNC: 12 U/L (ref 5–40)
ANION GAP SERPL CALC-SCNC: 15 MMOL/L
AST SERPL W P-5'-P-CCNC: 12 U/L (ref 10–37)
BILIRUB SERPL-MCNC: 0.2 MG/DL (ref 0.2–1.2)
BUN SERPL-MCNC: 20 MG/DL (ref 6–22)
CALCIUM SERPL-MCNC: 10.1 MG/DL (ref 8.4–10.5)
CHLORIDE SERPL-SCNC: 102 MMOL/L (ref 98–110)
CHOLEST SERPL-MCNC: 178 MG/DL (ref 110–200)
CO2 SERPL-SCNC: 27 MMOL/L (ref 20–32)
CREAT SERPL-MCNC: 0.5 MG/DL (ref 0.8–1.4)
GFRAA, 66117: >60
GFRNA, 66118: >60
GLOBULIN,GLOB: 2.2 G/DL (ref 2–4)
GLUCOSE SERPL-MCNC: 84 MG/DL (ref 70–99)
HDLC SERPL-MCNC: 2.9 MG/DL (ref 0–5)
HDLC SERPL-MCNC: 62 MG/DL (ref 40–59)
LDLC SERPL CALC-MCNC: 93 MG/DL (ref 50–99)
POTASSIUM SERPL-SCNC: 4.7 MMOL/L (ref 3.5–5.5)
PROT SERPL-MCNC: 6.8 G/DL (ref 6.2–8.1)
SODIUM SERPL-SCNC: 144 MMOL/L (ref 133–145)
TRIGL SERPL-MCNC: 118 MG/DL (ref 40–149)
TSH SERPL DL<=0.005 MIU/L-ACNC: 3.09 MCU/ML (ref 0.27–4.2)
VLDLC SERPL CALC-MCNC: 24 MG/DL (ref 8–30)

## 2019-03-21 RX ORDER — LEVOTHYROXINE SODIUM 75 UG/1
75 TABLET ORAL
Qty: 90 TAB | Refills: 0 | Status: SHIPPED | OUTPATIENT
Start: 2019-03-21 | End: 2019-07-31 | Stop reason: SDUPTHER

## 2019-03-21 RX ORDER — SIMVASTATIN 10 MG/1
TABLET, FILM COATED ORAL
Qty: 90 TAB | Refills: 1 | Status: SHIPPED | OUTPATIENT
Start: 2019-03-21 | End: 2019-09-19 | Stop reason: SDUPTHER

## 2019-04-24 ENCOUNTER — OFFICE VISIT (OUTPATIENT)
Dept: FAMILY MEDICINE CLINIC | Age: 59
End: 2019-04-24

## 2019-04-24 VITALS
TEMPERATURE: 97.8 F | HEIGHT: 62 IN | SYSTOLIC BLOOD PRESSURE: 117 MMHG | RESPIRATION RATE: 17 BRPM | OXYGEN SATURATION: 98 % | HEART RATE: 56 BPM | DIASTOLIC BLOOD PRESSURE: 75 MMHG | BODY MASS INDEX: 28.16 KG/M2 | WEIGHT: 153 LBS

## 2019-04-24 DIAGNOSIS — J01.00 ACUTE NON-RECURRENT MAXILLARY SINUSITIS: ICD-10-CM

## 2019-04-24 DIAGNOSIS — J20.9 ACUTE BRONCHITIS, UNSPECIFIED ORGANISM: Primary | ICD-10-CM

## 2019-04-24 RX ORDER — AMOXICILLIN AND CLAVULANATE POTASSIUM 875; 125 MG/1; MG/1
1 TABLET, FILM COATED ORAL 2 TIMES DAILY
Qty: 20 TAB | Refills: 0 | Status: SHIPPED | OUTPATIENT
Start: 2019-04-24 | End: 2019-05-04

## 2019-04-24 NOTE — PROGRESS NOTES
Ash James     Chief Complaint   Patient presents with    Cough    Nasal Congestion     Vitals:    19 0954   BP: 117/75   Pulse: (!) 56   Resp: 17   Temp: 97.8 °F (36.6 °C)   TempSrc: Oral   SpO2: 98%   Weight: 153 lb (69.4 kg)   Height: 5' 2\" (1.575 m)   PainSc:   0 - No pain         HPI: Patient is here complaining about nasal congestion, cough, productive sputum sinus pain and headaches for over 7 days     No fever no chills no shortness of breath no chest pain. Patient has used over-the-counter medication with no much help. Her symptoms are not improving actually getting worse        Past Medical History:   Diagnosis Date    Macular degeneration      History reviewed. No pertinent surgical history. Social History     Tobacco Use    Smoking status: Former Smoker     Years: 10.00     Types: Cigarettes     Last attempt to quit: 1995     Years since quittin.4    Smokeless tobacco: Never Used   Substance Use Topics    Alcohol use: Yes     Comment: occassionally       History reviewed. No pertinent family history. Review of Systems   Constitutional: Negative for chills, fever, malaise/fatigue and weight loss. HENT: Positive for congestion and sinus pain. Negative for ear discharge, ear pain, hearing loss and nosebleeds. Eyes: Negative for blurred vision, double vision and discharge. Respiratory: Positive for cough, sputum production and wheezing. Negative for hemoptysis and shortness of breath. Cardiovascular: Negative for chest pain, palpitations, claudication and leg swelling. Gastrointestinal: Negative for abdominal pain, constipation, diarrhea, nausea and vomiting. Genitourinary: Negative for dysuria, frequency and urgency. Musculoskeletal: Negative for myalgias. Skin: Negative for itching and rash. Neurological: Positive for headaches. Negative for dizziness, tingling, sensory change, speech change, focal weakness and weakness.        Physical Exam Constitutional: She is oriented to person, place, and time. She appears well-developed and well-nourished. No distress. HENT:   Head: Normocephalic and atraumatic. Mouth/Throat: Oropharynx is clear and moist. No oropharyngeal exudate. Eyes: Pupils are equal, round, and reactive to light. Conjunctivae are normal. Right eye exhibits no discharge. Left eye exhibits no discharge. No scleral icterus. Neck: Normal range of motion. Neck supple. No thyromegaly present. Cardiovascular: Normal rate, regular rhythm and normal heart sounds. No murmur heard. Pulmonary/Chest: Effort normal and breath sounds normal. No respiratory distress. She has no wheezes. She has no rales. She exhibits no tenderness. Abdominal: Soft. She exhibits no distension. There is no tenderness. There is no rebound. Musculoskeletal: Normal range of motion. She exhibits no edema, tenderness or deformity. Lymphadenopathy:     She has no cervical adenopathy. Neurological: She is alert and oriented to person, place, and time. No cranial nerve deficit. Coordination normal.   Skin: Skin is warm and dry. No rash noted. She is not diaphoretic. No erythema. No pallor. Psychiatric: She has a normal mood and affect. Her behavior is normal. Judgment and thought content normal.   Nursing note and vitals reviewed. Assessment and plan     Plan of care has been discussed with the patient, he agrees to the plan and verbalized understanding. All his questions were answered  More than 50% of the time spent in this visit was counseling the patient about  illness and treatment options     She is advised about increase liquid intake and also using saline nasal spray for sinus rinse. Natural sources of vitamin C are helpful    1. Acute bronchitis, unspecified organism    - amoxicillin-clavulanate (AUGMENTIN) 875-125 mg per tablet; Take 1 Tab by mouth two (2) times a day for 10 days. Dispense: 20 Tab; Refill: 0    2.  Acute non-recurrent maxillary sinusitis    - amoxicillin-clavulanate (AUGMENTIN) 875-125 mg per tablet; Take 1 Tab by mouth two (2) times a day for 10 days. Dispense: 20 Tab; Refill: 0    Current Outpatient Medications   Medication Sig Dispense Refill    amoxicillin-clavulanate (AUGMENTIN) 875-125 mg per tablet Take 1 Tab by mouth two (2) times a day for 10 days. 20 Tab 0    simvastatin (ZOCOR) 10 mg tablet take 1 tablet by mouth NIGHTLY 90 Tab 1    levothyroxine (SYNTHROID) 75 mcg tablet Take 1 Tab by mouth Daily (before breakfast). 90 Tab 0    ASCORBIC ACID/VITAMIN E/BIOTIN (HAIR, SKIN, NAILS WITH BIOTIN PO) Take  by mouth.  VIT A/VIT C/VIT E/ZINC/COPPER (PRESERVISION AREDS PO) Take  by mouth.  CHOLECALCIFEROL, VITAMIN D3, (VITAMIN D3 PO) Take  by mouth.  omega-3 fatty acids (FISH OIL) cap 3,000 mg.      Glucosamine &Chondroit-MV-Min3 252-467-43-0.5 mg tab Take  by mouth.  levothyroxine (SYNTHROID) 75 mcg tablet Take 1 Tab by mouth Daily (before breakfast) for 90 days. 90 Tab 0       Patient Active Problem List    Diagnosis Date Noted    Vitamin D deficiency 01/29/2016    Vitamin B12 deficiency 01/29/2016    Insomnia 01/29/2016    Hyperlipidemia 06/04/2015    Hypothyroidism 06/04/2015     Results for orders placed or performed in visit on 92/53/23   METABOLIC PANEL, COMPREHENSIVE   Result Value Ref Range    Glucose 84 70 - 99 mg/dL    BUN 20 6 - 22 mg/dL    Creatinine 0.5 (L) 0.8 - 1.4 mg/dL    Sodium 144 133 - 145 mmol/L    Potassium 4.7 3.5 - 5.5 mmol/L    Chloride 102 98 - 110 mmol/L    CO2 27 20 - 32 mmol/L    AST (SGOT) 12 10 - 37 U/L    ALT (SGPT) 12 5 - 40 U/L    Alk.  phosphatase 72 40 - 120 U/L    Bilirubin, total 0.2 0.2 - 1.2 mg/dL    Calcium 10.1 8.4 - 10.5 mg/dL    Protein, total 6.8 6.2 - 8.1 g/dL    Albumin 4.6 3.5 - 5.0 g/dL    A-G Ratio 2.1 1.1 - 2.6 ratio    Globulin 2.2 2.0 - 4.0 g/dL    Anion gap 15.0 mmol/L    GFRAA >60.0 >60.0    GFRNA >60.0 >60.0   LIPID PANEL   Result Value Ref Range    Triglyceride 118 40 - 149 mg/dL    HDL Cholesterol 62 (H) 40 - 59 mg/dL    Cholesterol, total 178 110 - 200 mg/dL    CHOLESTEROL/HDL 2.9 0.0 - 5.0    LDL, calculated 93 50 - 99 mg/dL    VLDL, calculated 24 8 - 30 mg/dL   TSH 3RD GENERATION   Result Value Ref Range    TSH 3.09 0.27 - 4.20 mcU/mL     Office Visit on 03/21/2019   Component Date Value Ref Range Status    Glucose 03/21/2019 84  70 - 99 mg/dL Final    BUN 03/21/2019 20  6 - 22 mg/dL Final    Creatinine 03/21/2019 0.5* 0.8 - 1.4 mg/dL Final    Sodium 03/21/2019 144  133 - 145 mmol/L Final    Potassium 03/21/2019 4.7  3.5 - 5.5 mmol/L Final    Chloride 03/21/2019 102  98 - 110 mmol/L Final    CO2 03/21/2019 27  20 - 32 mmol/L Final    AST (SGOT) 03/21/2019 12  10 - 37 U/L Final    ALT (SGPT) 03/21/2019 12  5 - 40 U/L Final    Alk. phosphatase 03/21/2019 72  40 - 120 U/L Final    Bilirubin, total 03/21/2019 0.2  0.2 - 1.2 mg/dL Final    Calcium 03/21/2019 10.1  8.4 - 10.5 mg/dL Final    Protein, total 03/21/2019 6.8  6.2 - 8.1 g/dL Final    Albumin 03/21/2019 4.6  3.5 - 5.0 g/dL Final    A-G Ratio 03/21/2019 2.1  1.1 - 2.6 ratio Final    Globulin 03/21/2019 2.2  2.0 - 4.0 g/dL Final    Anion gap 03/21/2019 15.0  mmol/L Final    Comment: Test includes Albumin, Alkaline Phosphatase, ALT, AST, BUN, Calcium, CO2,  Chloride, Creatinine, Glucose, Potassium, Sodium, Total Bilirubin and Total  Protein. Estimated GFR results are reported in mL/min/1.73 sq.m. by the MDRD equation. This eGFR is validated for stable chronic renal failure patients. This   equation  is unreliable in acute illness or patients with normal renal function.       GFRAA 03/21/2019 >60.0  >60.0 Final    GFRNA 03/21/2019 >60.0  >60.0 Final    Triglyceride 03/21/2019 118  40 - 149 mg/dL Final    HDL Cholesterol 03/21/2019 62* 40 - 59 mg/dL Final    Cholesterol, total 03/21/2019 178  110 - 200 mg/dL Final    CHOLESTEROL/HDL 03/21/2019 2.9  0.0 - 5.0 Final    LDL, calculated 03/21/2019 93  50 - 99 mg/dL Final    VLDL, calculated 03/21/2019 24  8 - 30 mg/dL Final    Comment: Test includes cholesterol, HDL cholesterol, triglycerides and LDL. Cholesterol Recommended NCEP guidelines in mg/dL:  Less than 200            Desirable  200 - 239                Borderline High  Greater than or  = 240   High  Please Note:  Total Chol/HDL Ratio                   Men     Women  1/2 Avg. Risk    3.4     3.3      Avg. Risk    5.0     4.4  2X  Avg. Risk    9.6     7.1  3X  Avg. Risk   23.4    11.0      TSH 03/21/2019 3.09  0.27 - 4.20 mcU/mL Final          Follow-up and Dispositions    · Return if symptoms worsen or fail to improve.

## 2019-04-24 NOTE — PROGRESS NOTES
Soren Cobb is a 60 y.o. female presents to office for cough and congestion about a week    Medication list has been reviewed with Soren Cobb and updated as of today's date     Health Maintenance items with a due date reviewed with patient:  Health Maintenance Due   Topic Date Due    Shingrix Vaccine Age 49> (1 of 2) 02/23/2009

## 2019-05-28 ENCOUNTER — OFFICE VISIT (OUTPATIENT)
Dept: FAMILY MEDICINE CLINIC | Age: 59
End: 2019-05-28

## 2019-05-28 VITALS
WEIGHT: 146 LBS | SYSTOLIC BLOOD PRESSURE: 106 MMHG | BODY MASS INDEX: 26.87 KG/M2 | OXYGEN SATURATION: 99 % | DIASTOLIC BLOOD PRESSURE: 70 MMHG | HEIGHT: 62 IN | TEMPERATURE: 98.3 F | HEART RATE: 66 BPM | RESPIRATION RATE: 16 BRPM

## 2019-05-28 DIAGNOSIS — H10.12 ALLERGIC CONJUNCTIVITIS OF LEFT EYE: ICD-10-CM

## 2019-05-28 DIAGNOSIS — H92.03 OTALGIA OF BOTH EARS: ICD-10-CM

## 2019-05-28 DIAGNOSIS — J02.9 SORE THROAT: Primary | ICD-10-CM

## 2019-05-28 DIAGNOSIS — J02.0 STREP PHARYNGITIS: ICD-10-CM

## 2019-05-28 LAB
S PYO AG THROAT QL: POSITIVE
VALID INTERNAL CONTROL?: YES

## 2019-05-28 RX ORDER — AZITHROMYCIN 250 MG/1
TABLET, FILM COATED ORAL
Qty: 6 TAB | Refills: 0 | Status: SHIPPED | OUTPATIENT
Start: 2019-05-28 | End: 2019-06-02

## 2019-05-28 NOTE — PROGRESS NOTES
Ash James     Chief Complaint   Patient presents with    Ear Pain    Eye Problem     Vitals:    19 0938   BP: 106/70   Pulse: 66   Resp: 16   Temp: 98.3 °F (36.8 °C)   TempSrc: Oral   SpO2: 99%   Weight: 146 lb (66.2 kg)   Height: 5' 2\" (1.575 m)   PainSc:   3         HPI: Patient is here complaining about sore throat for over 10 days started with a cough, cough has subsided patient sore throat and painful swallowing no fever no chills, she had tried gargling with salt and water which slightly improved momentarily, patient was treated for sinus infection last month, and patient felt she never got all better than she had body aches and fatigue for the whole time. She is taking care of her elderly parents and her father just got diagnosed with pneumonia. Patient also noticed left eye redness and tearing and itching, slightly improved with cold compression, there is no blurred vision and no eye discharge. Past Medical History:   Diagnosis Date    Macular degeneration      History reviewed. No pertinent surgical history. Social History     Tobacco Use    Smoking status: Former Smoker     Years: 10.00     Types: Cigarettes     Last attempt to quit: 1995     Years since quittin.5    Smokeless tobacco: Never Used   Substance Use Topics    Alcohol use: Yes     Comment: occassionally       History reviewed. No pertinent family history. Review of Systems   Constitutional: Negative for chills, fever, malaise/fatigue and weight loss. HENT: Positive for ear pain and sore throat. Negative for congestion, ear discharge, hearing loss, nosebleeds and sinus pain. Eyes: Negative for blurred vision, double vision and discharge. Respiratory: Negative for cough, hemoptysis, sputum production, shortness of breath and wheezing. Cardiovascular: Negative for chest pain, palpitations, claudication and leg swelling.    Gastrointestinal: Negative for abdominal pain, constipation, diarrhea, nausea and vomiting. Genitourinary: Positive for frequency. Negative for dysuria and urgency. Musculoskeletal: Positive for myalgias. Skin: Negative for itching and rash. Neurological: Negative for dizziness, tingling, sensory change, speech change, focal weakness, weakness and headaches. Physical Exam   Constitutional: She is oriented to person, place, and time. She appears well-developed and well-nourished. No distress. HENT:   Head: Normocephalic and atraumatic. Right Ear: External ear normal.   Left Ear: External ear normal.   Mouth/Throat: Oropharynx is clear and moist. No oropharyngeal exudate. Eyes: Pupils are equal, round, and reactive to light. Conjunctivae are normal. Right eye exhibits no discharge. Left eye exhibits no discharge. No scleral icterus. Left eye conjunctival erythema mild   Neck: Normal range of motion. Neck supple. No thyromegaly present. Cardiovascular: Normal rate, regular rhythm and normal heart sounds. No murmur heard. Pulmonary/Chest: Effort normal and breath sounds normal. No respiratory distress. She has no wheezes. She has no rales. She exhibits no tenderness. Abdominal: Soft. She exhibits no distension. There is no tenderness. There is no rebound. Musculoskeletal: Normal range of motion. She exhibits no edema or deformity. Lymphadenopathy:     She has no cervical adenopathy. Neurological: She is alert and oriented to person, place, and time. No cranial nerve deficit. Coordination normal.   Skin: Skin is warm and dry. No rash noted. She is not diaphoretic. No erythema. No pallor. Psychiatric: She has a normal mood and affect. Her behavior is normal. Judgment and thought content normal.   Nursing note and vitals reviewed. Assessment and plan     Plan of care has been discussed with the patient, he agrees to the plan and verbalized understanding.   All his questions were answered  More than 50% of the time spent in this visit was counseling the patient about  illness and treatment options         1. Strep pharyngitis  Strep a rapid antigen is positive  - azithromycin (ZITHROMAX) 250 mg tablet; Take 2 tablets today, then take 1 tablet daily  Dispense: 6 Tab; Refill: 0    2. Sore throat  Patient advised to stay well-hydrated gargle with salt water multiple times a day    3. Otalgia of both ears  Normal bilateral ear examination    4. Allergic conjunctivitis of left eye  Patient advised to use cold compression and if no improvement she needs to start over-the-counter antihistamine drops    Current Outpatient Medications   Medication Sig Dispense Refill    azithromycin (ZITHROMAX) 250 mg tablet Take 2 tablets today, then take 1 tablet daily 6 Tab 0    simvastatin (ZOCOR) 10 mg tablet take 1 tablet by mouth NIGHTLY 90 Tab 1    levothyroxine (SYNTHROID) 75 mcg tablet Take 1 Tab by mouth Daily (before breakfast) for 90 days. 90 Tab 0    ASCORBIC ACID/VITAMIN E/BIOTIN (HAIR, SKIN, NAILS WITH BIOTIN PO) Take  by mouth.  VIT A/VIT C/VIT E/ZINC/COPPER (PRESERVISION AREDS PO) Take  by mouth.  CHOLECALCIFEROL, VITAMIN D3, (VITAMIN D3 PO) Take  by mouth.  omega-3 fatty acids (FISH OIL) cap 3,000 mg.      Glucosamine &Chondroit-MV-Min3 043-617-41-0.5 mg tab Take  by mouth.  levothyroxine (SYNTHROID) 75 mcg tablet Take 1 Tab by mouth Daily (before breakfast).  90 Tab 0       Patient Active Problem List    Diagnosis Date Noted    Vitamin D deficiency 01/29/2016    Vitamin B12 deficiency 01/29/2016    Insomnia 01/29/2016    Hyperlipidemia 06/04/2015    Hypothyroidism 06/04/2015     Results for orders placed or performed in visit on 39/87/42   METABOLIC PANEL, COMPREHENSIVE   Result Value Ref Range    Glucose 84 70 - 99 mg/dL    BUN 20 6 - 22 mg/dL    Creatinine 0.5 (L) 0.8 - 1.4 mg/dL    Sodium 144 133 - 145 mmol/L    Potassium 4.7 3.5 - 5.5 mmol/L    Chloride 102 98 - 110 mmol/L    CO2 27 20 - 32 mmol/L    AST (SGOT) 12 10 - 37 U/L    ALT (SGPT) 12 5 - 40 U/L    Alk. phosphatase 72 40 - 120 U/L    Bilirubin, total 0.2 0.2 - 1.2 mg/dL    Calcium 10.1 8.4 - 10.5 mg/dL    Protein, total 6.8 6.2 - 8.1 g/dL    Albumin 4.6 3.5 - 5.0 g/dL    A-G Ratio 2.1 1.1 - 2.6 ratio    Globulin 2.2 2.0 - 4.0 g/dL    Anion gap 15.0 mmol/L    GFRAA >60.0 >60.0    GFRNA >60.0 >60.0   LIPID PANEL   Result Value Ref Range    Triglyceride 118 40 - 149 mg/dL    HDL Cholesterol 62 (H) 40 - 59 mg/dL    Cholesterol, total 178 110 - 200 mg/dL    CHOLESTEROL/HDL 2.9 0.0 - 5.0    LDL, calculated 93 50 - 99 mg/dL    VLDL, calculated 24 8 - 30 mg/dL   TSH 3RD GENERATION   Result Value Ref Range    TSH 3.09 0.27 - 4.20 mcU/mL     Office Visit on 03/21/2019   Component Date Value Ref Range Status    Glucose 03/21/2019 84  70 - 99 mg/dL Final    BUN 03/21/2019 20  6 - 22 mg/dL Final    Creatinine 03/21/2019 0.5* 0.8 - 1.4 mg/dL Final    Sodium 03/21/2019 144  133 - 145 mmol/L Final    Potassium 03/21/2019 4.7  3.5 - 5.5 mmol/L Final    Chloride 03/21/2019 102  98 - 110 mmol/L Final    CO2 03/21/2019 27  20 - 32 mmol/L Final    AST (SGOT) 03/21/2019 12  10 - 37 U/L Final    ALT (SGPT) 03/21/2019 12  5 - 40 U/L Final    Alk. phosphatase 03/21/2019 72  40 - 120 U/L Final    Bilirubin, total 03/21/2019 0.2  0.2 - 1.2 mg/dL Final    Calcium 03/21/2019 10.1  8.4 - 10.5 mg/dL Final    Protein, total 03/21/2019 6.8  6.2 - 8.1 g/dL Final    Albumin 03/21/2019 4.6  3.5 - 5.0 g/dL Final    A-G Ratio 03/21/2019 2.1  1.1 - 2.6 ratio Final    Globulin 03/21/2019 2.2  2.0 - 4.0 g/dL Final    Anion gap 03/21/2019 15.0  mmol/L Final    Comment: Test includes Albumin, Alkaline Phosphatase, ALT, AST, BUN, Calcium, CO2,  Chloride, Creatinine, Glucose, Potassium, Sodium, Total Bilirubin and Total  Protein. Estimated GFR results are reported in mL/min/1.73 sq.m. by the MDRD equation. This eGFR is validated for stable chronic renal failure patients.  This   equation  is unreliable in acute illness or patients with normal renal function.  GFRAA 03/21/2019 >60.0  >60.0 Final    GFRNA 03/21/2019 >60.0  >60.0 Final    Triglyceride 03/21/2019 118  40 - 149 mg/dL Final    HDL Cholesterol 03/21/2019 62* 40 - 59 mg/dL Final    Cholesterol, total 03/21/2019 178  110 - 200 mg/dL Final    CHOLESTEROL/HDL 03/21/2019 2.9  0.0 - 5.0 Final    LDL, calculated 03/21/2019 93  50 - 99 mg/dL Final    VLDL, calculated 03/21/2019 24  8 - 30 mg/dL Final    Comment: Test includes cholesterol, HDL cholesterol, triglycerides and LDL. Cholesterol Recommended NCEP guidelines in mg/dL:  Less than 200            Desirable  200 - 239                Borderline High  Greater than or  = 240   High  Please Note:  Total Chol/HDL Ratio                   Men     Women  1/2 Avg. Risk    3.4     3.3      Avg. Risk    5.0     4.4  2X  Avg. Risk    9.6     7.1  3X  Avg. Risk   23.4    11.0      TSH 03/21/2019 3.09  0.27 - 4.20 mcU/mL Final          Follow-up and Dispositions    · Return in about 1 week (around 6/4/2019), or if symptoms worsen or fail to improve.

## 2019-05-28 NOTE — PROGRESS NOTES
Laura Silva is a 60 y.o. female presents to office for ear pain and left watery eye    Medication list has been reviewed with Laura Silva and updated as of today's date     Health Maintenance items with a due date reviewed with patient:  Health Maintenance Due   Topic Date Due    Shingrix Vaccine Age 49> (1 of 2) 02/23/2009

## 2019-06-03 ENCOUNTER — TELEPHONE (OUTPATIENT)
Dept: FAMILY MEDICINE CLINIC | Age: 59
End: 2019-06-03

## 2019-06-03 NOTE — TELEPHONE ENCOUNTER
Pt calling b/c her symptoms have not resolved. I offered her appt w/ another office. Pt would like a call back from the nurse.

## 2019-06-06 NOTE — TELEPHONE ENCOUNTER
Called and spoke with patient. She is feeling somewhat better but still having some congestion and sore throat at night. She will try to use a neti pot or saline rinse. Closing encounter.

## 2019-07-31 DIAGNOSIS — E03.9 HYPOTHYROIDISM (ACQUIRED): ICD-10-CM

## 2019-07-31 NOTE — TELEPHONE ENCOUNTER
Pt calling to request medication refill of:  Requested Prescriptions     Pending Prescriptions Disp Refills    levothyroxine (SYNTHROID) 75 mcg tablet [Pharmacy Med Name: LEVOTHYROXINE 75 MCG TABLET] 90 Tab 0     Sig: take 1 tablet by mouth once daily (BEFORE BREAKFAST)          be sent to [address]  Pt has about 9 tabs remaining. Pts last appt was 5/28  Advised pt of 72 hour time frame for refill requests. Please advise.     Pt was made aware that their provider is not in the office

## 2019-08-01 RX ORDER — LEVOTHYROXINE SODIUM 75 UG/1
TABLET ORAL
Qty: 90 TAB | Refills: 0 | Status: SHIPPED | OUTPATIENT
Start: 2019-08-01 | End: 2019-09-19 | Stop reason: SDUPTHER

## 2019-09-19 ENCOUNTER — OFFICE VISIT (OUTPATIENT)
Dept: FAMILY MEDICINE CLINIC | Age: 59
End: 2019-09-19

## 2019-09-19 VITALS
BODY MASS INDEX: 27.9 KG/M2 | DIASTOLIC BLOOD PRESSURE: 61 MMHG | HEIGHT: 62 IN | RESPIRATION RATE: 16 BRPM | WEIGHT: 151.6 LBS | HEART RATE: 66 BPM | OXYGEN SATURATION: 99 % | TEMPERATURE: 97.9 F | SYSTOLIC BLOOD PRESSURE: 101 MMHG

## 2019-09-19 DIAGNOSIS — E03.9 HYPOTHYROIDISM (ACQUIRED): ICD-10-CM

## 2019-09-19 DIAGNOSIS — R06.4 BREATHING ABNORMALLY DEEP: ICD-10-CM

## 2019-09-19 DIAGNOSIS — E78.5 HYPERLIPIDEMIA, UNSPECIFIED HYPERLIPIDEMIA TYPE: ICD-10-CM

## 2019-09-19 DIAGNOSIS — E03.9 HYPOTHYROIDISM (ACQUIRED): Primary | ICD-10-CM

## 2019-09-19 DIAGNOSIS — Z23 ENCOUNTER FOR IMMUNIZATION: ICD-10-CM

## 2019-09-19 PROBLEM — G56.00 CARPAL TUNNEL SYNDROME: Status: ACTIVE | Noted: 2019-09-19

## 2019-09-19 PROBLEM — M54.2 NECK PAIN: Status: ACTIVE | Noted: 2019-09-19

## 2019-09-19 PROBLEM — M79.18 MYOFASCIAL PAIN: Status: ACTIVE | Noted: 2017-11-15

## 2019-09-19 RX ORDER — LEVOTHYROXINE SODIUM 75 UG/1
TABLET ORAL
Qty: 90 TAB | Refills: 1 | Status: SHIPPED | OUTPATIENT
Start: 2019-09-19 | End: 2020-02-12 | Stop reason: SDUPTHER

## 2019-09-19 RX ORDER — DOXEPIN HYDROCHLORIDE 50 MG/G
CREAM TOPICAL
COMMUNITY
End: 2020-02-12

## 2019-09-19 RX ORDER — SIMVASTATIN 10 MG/1
TABLET, FILM COATED ORAL
Qty: 90 TAB | Refills: 1 | Status: SHIPPED | OUTPATIENT
Start: 2019-09-19 | End: 2020-02-12 | Stop reason: SDUPTHER

## 2019-09-19 RX ORDER — DICLOFENAC SODIUM 30 MG/G
GEL TOPICAL
COMMUNITY
End: 2020-02-12

## 2019-09-19 NOTE — PROGRESS NOTES
Sonny Faria     Chief Complaint   Patient presents with    Cholesterol Problem     f/u    Hypothyroidism     f/u    Immunization/Injection     flu     Vitals:    19 1508   BP: 101/61   Pulse: 66   Resp: 16   Temp: 97.9 °F (36.6 °C)   TempSrc: Oral   SpO2: 99%   Weight: 151 lb 9.6 oz (68.8 kg)   Height: 5' 2\" (1.575 m)   PainSc:   0 - No pain         HPI: Patient is here to follow-up on hypothyroidism and hyperlipidemia, blood work was done in March is within normal limit,    Patient has been doing well she has been complaining about being aware of her breathing is not shortness of breath no dyspnea she is exercising by walking regularly with no problems she can go upstairs, she did not hear any wheezing no cough no sputum production, she states she feels like that she is aware of her time she breathes and it like burn her lungs when she, no chest pain!!    Patient has history of smoking and she quit in           Past Medical History:   Diagnosis Date    Macular degeneration      History reviewed. No pertinent surgical history. Social History     Tobacco Use    Smoking status: Former Smoker     Years: 10.00     Types: Cigarettes     Last attempt to quit: 1995     Years since quittin.8    Smokeless tobacco: Never Used   Substance Use Topics    Alcohol use: Yes     Comment: occassionally       Family History   Problem Relation Age of Onset    No Known Problems Mother     No Known Problems Father        Review of Systems   Constitutional: Negative for chills, fever, malaise/fatigue and weight loss. HENT: Negative for congestion, ear discharge, ear pain, hearing loss, nosebleeds, sinus pain and sore throat. Eyes: Negative for blurred vision, double vision and discharge. Respiratory: Negative for cough, hemoptysis, sputum production and shortness of breath. Cardiovascular: Negative for chest pain, palpitations, claudication and leg swelling.    Gastrointestinal: Negative for abdominal pain, constipation, diarrhea, nausea and vomiting. Genitourinary: Negative for dysuria, frequency and urgency. Musculoskeletal: Negative for myalgias. Skin: Negative for itching and rash. Neurological: Negative for dizziness, tingling, sensory change, speech change, focal weakness, weakness and headaches. Psychiatric/Behavioral: Negative for depression and suicidal ideas. Physical Exam   Constitutional: She is oriented to person, place, and time. She appears well-developed and well-nourished. No distress. HENT:   Head: Normocephalic and atraumatic. Mouth/Throat: Oropharynx is clear and moist. No oropharyngeal exudate. Eyes: Pupils are equal, round, and reactive to light. Conjunctivae are normal. Right eye exhibits no discharge. Left eye exhibits no discharge. No scleral icterus. Neck: Normal range of motion. Neck supple. No thyromegaly present. Cardiovascular: Normal rate, regular rhythm and normal heart sounds. No murmur heard. Pulmonary/Chest: Effort normal and breath sounds normal. No respiratory distress. She has no wheezes. She has no rales. She exhibits no tenderness. Abdominal: Soft. She exhibits no distension. There is no tenderness. There is no rebound. Musculoskeletal: Normal range of motion. She exhibits no edema, tenderness or deformity. Lymphadenopathy:     She has no cervical adenopathy. Neurological: She is alert and oriented to person, place, and time. No cranial nerve deficit. Coordination normal.   Skin: Skin is warm and dry. No rash noted. She is not diaphoretic. No erythema. No pallor. Psychiatric: She has a normal mood and affect. Her behavior is normal. Judgment and thought content normal.   Nursing note and vitals reviewed. Assessment and plan     Plan of care has been discussed with the patient, he agrees to the plan and verbalized understanding.   All his questions were answered  More than 50% of the time spent in this visit was counseling the patient about  illness and treatment options         1. Hypothyroidism (acquired)    - levothyroxine (SYNTHROID) 75 mcg tablet; take 1 tablet by mouth once daily (BEFORE BREAKFAST)  Dispense: 90 Tab; Refill: 1    2. Hyperlipidemia, unspecified hyperlipidemia type    - simvastatin (ZOCOR) 10 mg tablet; take 1 tablet by mouth NIGHTLY  Dispense: 90 Tab; Refill: 1    3. Encounter for immunization    - INFLUENZA VIRUS VAC QUAD,SPLIT,PRESV FREE SYRINGE IM    4. Breathing abnormally deep     Patient examination is normal    Peak flow meter was done her best was 350 which is about 80% of her predictable    - XR CHEST PA LAT; Future    Current Outpatient Medications   Medication Sig Dispense Refill    levothyroxine (SYNTHROID) 75 mcg tablet take 1 tablet by mouth once daily (BEFORE BREAKFAST) 90 Tab 1    simvastatin (ZOCOR) 10 mg tablet take 1 tablet by mouth NIGHTLY 90 Tab 1    ASCORBIC ACID/VITAMIN E/BIOTIN (HAIR, SKIN, NAILS WITH BIOTIN PO) Take 1 Cap by mouth daily.  CHOLECALCIFEROL, VITAMIN D3, (VITAMIN D3 PO) Take 2,000 Units by mouth daily.  omega-3 fatty acids (FISH OIL) cap Take 3,000 mg by mouth daily.  Glucosamine &Chondroit-MV-Min3 636-403-99-0.5 mg tab Take 1 Tab by mouth daily.  doxepin (DOXEPIN, ZONALON, PRUDOXIN, 5% CREAM) 5 % topical cream doxepin 5 % topical cream   APPLY 2-3g TO THE AFFECTED AREA(S) BY TOPICAL ROUTE 4 TIMES PER DAY WITH AN INTERVAL OF AT LEAST 3-4 HOURS BETWEEN APPLICATIONS NO MORE THAN 8 CONSECUTIVE DAYS      diclofenac (SOLARAZE) 3 % topical gel diclofenac 3 % topical gel   APPLY 2.5g TO AFFECTED AREA(S) 4 TIMES DAILY AS NEEDED      VIT A/VIT C/VIT E/ZINC/COPPER (PRESERVISION AREDS PO) Take  by mouth.          Patient Active Problem List    Diagnosis Date Noted    Carpal tunnel syndrome 09/19/2019    Neck pain 09/19/2019    Myofascial pain 11/15/2017    Vitamin D deficiency 01/29/2016    Vitamin B12 deficiency 01/29/2016    Insomnia 01/29/2016  Hyperlipidemia 06/04/2015    Hypothyroidism 06/04/2015    Cervical radicular pain 12/21/2011    Cervicalgia 12/21/2011     Results for orders placed or performed in visit on 05/28/19   AMB POC RAPID STREP A   Result Value Ref Range    VALID INTERNAL CONTROL POC Yes     Group A Strep Ag Positive Negative     No visits with results within 3 Month(s) from this visit. Latest known visit with results is:   Office Visit on 05/28/2019   Component Date Value Ref Range Status    VALID INTERNAL CONTROL POC 05/28/2019 Yes   Final    Group A Strep Ag 05/28/2019 Positive  Negative Final          Follow-up and Dispositions    · Return in about 3 months (around 12/19/2019).

## 2019-09-19 NOTE — PROGRESS NOTES
Sonny Valenzuela is a 60 y.o. female presents in office for cholesterol and thyroid. Health Maintenance Due   Topic Date Due    Shingrix Vaccine Age 49> (1 of 2) 02/23/2009    Influenza Age 5 to Adult  08/01/2019           1. Have you been to the ER, urgent care clinic since your last visit? Hospitalized since your last visit? No  2. Have you seen or consulted any other health care providers outside of the [address] [place] [address] since your last visit? Include any pap smears or colon screening. No    Patient is interested in the flu vaccine today. She has not received the shingles vaccine.

## 2020-01-16 LAB
A-G RATIO,AGRAT: 1.7 RATIO (ref 1.1–2.6)
ALBUMIN SERPL-MCNC: 4.5 G/DL (ref 3.5–5)
ALP SERPL-CCNC: 63 U/L (ref 40–120)
ALT SERPL-CCNC: 13 U/L (ref 5–40)
ANION GAP SERPL CALC-SCNC: 11 MMOL/L
AST SERPL W P-5'-P-CCNC: 14 U/L (ref 10–37)
BILIRUB SERPL-MCNC: 0.4 MG/DL (ref 0.2–1.2)
BUN SERPL-MCNC: 15 MG/DL (ref 6–22)
CALCIUM SERPL-MCNC: 9.8 MG/DL (ref 8.4–10.5)
CHLORIDE SERPL-SCNC: 106 MMOL/L (ref 98–110)
CHOLEST SERPL-MCNC: 192 MG/DL (ref 110–200)
CO2 SERPL-SCNC: 27 MMOL/L (ref 20–32)
CREAT SERPL-MCNC: 0.6 MG/DL (ref 0.8–1.4)
GFRAA, 66117: >60
GFRNA, 66118: >60
GLOBULIN,GLOB: 2.6 G/DL (ref 2–4)
GLUCOSE SERPL-MCNC: 94 MG/DL (ref 70–99)
HDLC SERPL-MCNC: 2.9 MG/DL (ref 0–5)
HDLC SERPL-MCNC: 66 MG/DL
LDL/HDL RATIO,LDHD: 1.7
LDLC SERPL CALC-MCNC: 113 MG/DL (ref 50–99)
NON-HDL CHOLESTEROL, 011976: 126 MG/DL
POTASSIUM SERPL-SCNC: 4.5 MMOL/L (ref 3.5–5.5)
PROT SERPL-MCNC: 7.1 G/DL (ref 6.2–8.1)
SODIUM SERPL-SCNC: 144 MMOL/L (ref 133–145)
T4 FREE SERPL-MCNC: 1.3 NG/DL (ref 0.9–1.8)
TRIGL SERPL-MCNC: 68 MG/DL (ref 40–149)
TSH SERPL DL<=0.005 MIU/L-ACNC: 2.26 MCU/ML (ref 0.27–4.2)
VLDLC SERPL CALC-MCNC: 14 MG/DL (ref 8–30)

## 2020-01-20 NOTE — PROGRESS NOTES
Results are within normal limits     LDL is slightly elevated is the patient consistent with simvastatin ?

## 2020-02-12 ENCOUNTER — OFFICE VISIT (OUTPATIENT)
Dept: FAMILY MEDICINE CLINIC | Age: 60
End: 2020-02-12

## 2020-02-12 VITALS
BODY MASS INDEX: 29.63 KG/M2 | DIASTOLIC BLOOD PRESSURE: 77 MMHG | OXYGEN SATURATION: 97 % | HEART RATE: 82 BPM | HEIGHT: 62 IN | TEMPERATURE: 97.7 F | RESPIRATION RATE: 16 BRPM | SYSTOLIC BLOOD PRESSURE: 122 MMHG | WEIGHT: 161 LBS

## 2020-02-12 DIAGNOSIS — E55.9 VITAMIN D DEFICIENCY: ICD-10-CM

## 2020-02-12 DIAGNOSIS — G89.29 CHRONIC RIGHT HIP PAIN: ICD-10-CM

## 2020-02-12 DIAGNOSIS — E78.2 MIXED HYPERLIPIDEMIA: Primary | ICD-10-CM

## 2020-02-12 DIAGNOSIS — E78.5 HYPERLIPIDEMIA, UNSPECIFIED HYPERLIPIDEMIA TYPE: ICD-10-CM

## 2020-02-12 DIAGNOSIS — M54.12 CERVICAL RADICULAR PAIN: ICD-10-CM

## 2020-02-12 DIAGNOSIS — E03.9 HYPOTHYROIDISM (ACQUIRED): ICD-10-CM

## 2020-02-12 DIAGNOSIS — M25.551 CHRONIC RIGHT HIP PAIN: ICD-10-CM

## 2020-02-12 RX ORDER — SIMVASTATIN 10 MG/1
TABLET, FILM COATED ORAL
Qty: 90 TAB | Refills: 1 | Status: SHIPPED | OUTPATIENT
Start: 2020-02-12 | End: 2020-11-29

## 2020-02-12 RX ORDER — CELECOXIB 200 MG/1
200 CAPSULE ORAL DAILY
Qty: 30 CAP | Refills: 0 | Status: SHIPPED | OUTPATIENT
Start: 2020-02-12 | End: 2020-03-13

## 2020-02-12 RX ORDER — LEVOTHYROXINE SODIUM 75 UG/1
TABLET ORAL
Qty: 90 TAB | Refills: 1 | Status: SHIPPED | OUTPATIENT
Start: 2020-02-12 | End: 2020-05-26 | Stop reason: SDUPTHER

## 2020-02-12 RX ORDER — GLUCOSAMINE/CHONDR SU A SOD 750-600 MG
TABLET ORAL
COMMUNITY
End: 2021-06-11

## 2020-02-12 NOTE — PROGRESS NOTES
Myla Wright is a 60 y.o. female (: 1960) presenting to address:    Chief Complaint   Patient presents with    Thyroid Problem    Cholesterol Problem    Labs     discuss lab results       Vitals:    20 1533   BP: 122/77   Pulse: 82   Resp: 16   Temp: 97.7 °F (36.5 °C)   TempSrc: Oral   SpO2: 97%   Weight: 161 lb (73 kg)   Height: 5' 2\" (1.575 m)   PainSc:   4   PainLoc: Hip       Hearing/Vision:   No exam data present    Learning Assessment:     Learning Assessment 2019   PRIMARY LEARNER Patient   PRIMARY LANGUAGE ENGLISH   LEARNER PREFERENCE PRIMARY PICTURES     LISTENING     READING     DEMONSTRATION     VIDEOS   ANSWERED BY patient   RELATIONSHIP SELF     Depression Screening:     3 most recent PHQ Screens 2020   Little interest or pleasure in doing things Not at all   Feeling down, depressed, irritable, or hopeless Not at all   Total Score PHQ 2 0     Fall Risk Assessment:     Fall Risk Assessment, last 12 mths 2019   Able to walk? Yes   Fall in past 12 months? No     Abuse Screening:     Abuse Screening Questionnaire 2019   Do you ever feel afraid of your partner? N   Are you in a relationship with someone who physically or mentally threatens you? N   Is it safe for you to go home? Y     Coordination of Care Questionaire:   1. Have you been to the ER, urgent care clinic since your last visit? Hospitalized since your last visit? NO    2. Have you seen or consulted any other health care providers outside of the [address] [place] [address] since your last visit? Include any pap smears or colon screening. YES Dr. Zohreh Jerry    Advanced Directive:   1. Do you have an Advanced Directive? NO    2. Would you like information on Advanced Directives?  NO

## 2020-02-12 NOTE — PROGRESS NOTES
Galindo Linares     Chief Complaint   Patient presents with    Thyroid Problem    Cholesterol Problem    Labs     discuss lab results     Vitals:    20 1533   BP: 122/77   Pulse: 82   Resp: 16   Temp: 97.7 °F (36.5 °C)   TempSrc: Oral   SpO2: 97%   Weight: 161 lb (73 kg)   Height: 5' 2\" (1.575 m)   PainSc:   4   PainLoc: Hip         HPI: Mariam Gloria  Is here for follow up on lab results     Has no acute complaons ,has been having right hip pain she is taking ibuprofen 800 mg daily, I have discouraged the patient from using ibuprofen she can use Tylenol and consider starting Celebrex. 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 Lab result has been reviewed and discussed with patient    Cholesterol level is acceptable LDL has elevated slightly compared to last time patient has good HDL. Thyroid function within normal limit she is adherent to her medication as is    Past Medical History:   Diagnosis Date    Macular degeneration      History reviewed. No pertinent surgical history. Social History     Tobacco Use    Smoking status: Former Smoker     Years: 10.00     Types: Cigarettes     Last attempt to quit: 1995     Years since quittin.2    Smokeless tobacco: Never Used   Substance Use Topics    Alcohol use: Yes     Comment: occassionally       Family History   Problem Relation Age of Onset    No Known Problems Mother     No Known Problems Father        Review of Systems   Constitutional: Negative for chills, fever, malaise/fatigue and weight loss. HENT: Positive for sinus pain. Negative for congestion, ear discharge, ear pain, hearing loss, nosebleeds and sore throat. Eyes: Negative for blurred vision, double vision and discharge. Respiratory: Negative for cough.     Cardiovascular: Negative for chest pain, palpitations, claudication and leg swelling. Gastrointestinal: Negative for abdominal pain, constipation, diarrhea, nausea and vomiting. Genitourinary: Negative for dysuria, frequency and urgency. Musculoskeletal: Negative for myalgias. Skin: Negative for itching and rash. Neurological: Negative for dizziness, tingling, sensory change, speech change, focal weakness, weakness and headaches. Psychiatric/Behavioral: Negative for depression and suicidal ideas. Physical Exam  Vitals signs and nursing note reviewed. Constitutional:       General: She is not in acute distress. Appearance: She is well-developed. She is not diaphoretic. HENT:      Head: Normocephalic and atraumatic. Mouth/Throat:      Pharynx: No oropharyngeal exudate. Eyes:      General: No scleral icterus. Right eye: No discharge. Left eye: No discharge. Conjunctiva/sclera: Conjunctivae normal.      Pupils: Pupils are equal, round, and reactive to light. Neck:      Thyroid: No thyromegaly. Cardiovascular:      Rate and Rhythm: Normal rate and regular rhythm. Heart sounds: Normal heart sounds. No murmur. Pulmonary:      Effort: Pulmonary effort is normal. No respiratory distress. Breath sounds: Normal breath sounds. No wheezing or rales. Chest:      Chest wall: No tenderness. Abdominal:      General: There is no distension. Palpations: Abdomen is soft. Tenderness: There is no abdominal tenderness. There is no rebound. Musculoskeletal: Normal range of motion. General: No tenderness or deformity. Lymphadenopathy:      Cervical: No cervical adenopathy. Skin:     General: Skin is warm and dry. Coloration: Skin is not pale. Findings: No erythema or rash. Neurological:      Mental Status: She is alert and oriented to person, place, and time. Cranial Nerves: No cranial nerve deficit.       Coordination: Coordination normal. Psychiatric:         Behavior: Behavior normal.         Thought Content: Thought content normal.         Judgment: Judgment normal.          Assessment and plan     Plan of care has been discussed with the patient, he agrees to the plan and verbalized understanding. All his questions were answered  More than 50% of the time spent in this visit was counseling the patient about  illness and treatment options         1. Hypothyroidism (acquired)  Continue levothyroxine TSH is within normal limit  - levothyroxine (SYNTHROID) 75 mcg tablet; take 1 tablet by mouth once daily (BEFORE BREAKFAST)  Dispense: 90 Tab; Refill: 1    2. Mixed hyperlipidemia  Patient is adherent to simvastatin 10 mg daily    3. Vitamin D deficiency  Continue vitamin D supplement 2000 units daily    4. Cervical radicular pain      5. Chronic right hip pain  Patient will be scheduled for hip replacement in the next few months   - celecoxib (CELEBREX) 200 mg capsule; Take 1 Cap by mouth daily for 30 days. Dispense: 30 Cap; Refill: 0    6. Hyperlipidemia, unspecified hyperlipidemia type    - simvastatin (ZOCOR) 10 mg tablet; take 1 tablet by mouth NIGHTLY  Dispense: 90 Tab; Refill: 1    Current Outpatient Medications   Medication Sig Dispense Refill    Biotin 2,500 mcg cap Take  by mouth.  levothyroxine (SYNTHROID) 75 mcg tablet take 1 tablet by mouth once daily (BEFORE BREAKFAST) 90 Tab 1    simvastatin (ZOCOR) 10 mg tablet take 1 tablet by mouth NIGHTLY 90 Tab 1    celecoxib (CELEBREX) 200 mg capsule Take 1 Cap by mouth daily for 30 days. 30 Cap 0    ASCORBIC ACID/VITAMIN E/BIOTIN (HAIR, SKIN, NAILS WITH BIOTIN PO) Take 1 Cap by mouth daily.  CHOLECALCIFEROL, VITAMIN D3, (VITAMIN D3 PO) Take 2,000 Units by mouth daily.  omega-3 fatty acids (FISH OIL) cap Take 3,000 mg by mouth daily.  Glucosamine &Chondroit-MV-Min3 923-399-94-0.5 mg tab Take 1 Tab by mouth daily.          Patient Active Problem List    Diagnosis Date Noted  Carpal tunnel syndrome 09/19/2019    Neck pain 09/19/2019    Myofascial pain 11/15/2017    Vitamin D deficiency 01/29/2016    Vitamin B12 deficiency 01/29/2016    Insomnia 01/29/2016    Hyperlipidemia 06/04/2015    Hypothyroidism 06/04/2015    Cervical radicular pain 12/21/2011    Cervicalgia 12/21/2011     Results for orders placed or performed in visit on 01/16/20   LIPID PANEL   Result Value Ref Range    Triglyceride 68 40 - 149 mg/dL    HDL Cholesterol 66 >=40 mg/dL    Cholesterol, total 192 110 - 200 mg/dL    CHOLESTEROL/HDL 2.9 0.0 - 5.0    Non-HDL Cholesterol 126 <130 mg/dL    LDL, calculated 113 (H) 50 - 99 mg/dL    VLDL, calculated 14 8 - 30 mg/dL    LDL/HDL Ratio 1.7    METABOLIC PANEL, COMPREHENSIVE   Result Value Ref Range    Glucose 94 70 - 99 mg/dL    BUN 15 6 - 22 mg/dL    Creatinine 0.6 (L) 0.8 - 1.4 mg/dL    Sodium 144 133 - 145 mmol/L    Potassium 4.5 3.5 - 5.5 mmol/L    Chloride 106 98 - 110 mmol/L    CO2 27 20 - 32 mmol/L    AST (SGOT) 14 10 - 37 U/L    ALT (SGPT) 13 5 - 40 U/L    Alk.  phosphatase 63 40 - 120 U/L    Bilirubin, total 0.4 0.2 - 1.2 mg/dL    Calcium 9.8 8.4 - 10.5 mg/dL    Protein, total 7.1 6.2 - 8.1 g/dL    Albumin 4.5 3.5 - 5.0 g/dL    A-G Ratio 1.7 1.1 - 2.6 ratio    Globulin 2.6 2.0 - 4.0 g/dL    Anion gap 11.0 mmol/L    GFRAA >60.0 >60.0    GFRNA >60.0 >60.0   T4, FREE   Result Value Ref Range    T4, Free 1.3 0.9 - 1.8 ng/dL   TSH 3RD GENERATION   Result Value Ref Range    TSH 2.26 0.27 - 4.20 mcU/mL     Orders Only on 01/16/2020   Component Date Value Ref Range Status    Triglyceride 01/16/2020 68  40 - 149 mg/dL Final    HDL Cholesterol 01/16/2020 66  >=40 mg/dL Final    Cholesterol, total 01/16/2020 192  110 - 200 mg/dL Final    CHOLESTEROL/HDL 01/16/2020 2.9  0.0 - 5.0 Final    Non-HDL Cholesterol 01/16/2020 126  <130 mg/dL Final    LDL, calculated 01/16/2020 113* 50 - 99 mg/dL Final    VLDL, calculated 01/16/2020 14  8 - 30 mg/dL Final    LDL/HDL Ratio 01/16/2020 1.7   Final    Comment: Test includes cholesterol, HDL cholesterol, triglycerides and LDL. Cholesterol Recommended NCEP guidelines in mg/dL:  Less than 200            Desirable  200 - 239                Borderline High  Greater than or  = 240   High  Please Note:  Total Chol/HDL Ratio                   Men     Women  1/2 Avg. Risk    3.4     3.3      Avg. Risk    5.0     4.4  2X  Avg. Risk    9.6     7.1  3X  Avg. Risk   23.4    11.0      Glucose 01/16/2020 94  70 - 99 mg/dL Final    BUN 01/16/2020 15  6 - 22 mg/dL Final    Creatinine 01/16/2020 0.6* 0.8 - 1.4 mg/dL Final    Sodium 01/16/2020 144  133 - 145 mmol/L Final    Potassium 01/16/2020 4.5  3.5 - 5.5 mmol/L Final    Chloride 01/16/2020 106  98 - 110 mmol/L Final    CO2 01/16/2020 27  20 - 32 mmol/L Final    AST (SGOT) 01/16/2020 14  10 - 37 U/L Final    ALT (SGPT) 01/16/2020 13  5 - 40 U/L Final    Alk. phosphatase 01/16/2020 63  40 - 120 U/L Final    Bilirubin, total 01/16/2020 0.4  0.2 - 1.2 mg/dL Final    Calcium 01/16/2020 9.8  8.4 - 10.5 mg/dL Final    Protein, total 01/16/2020 7.1  6.2 - 8.1 g/dL Final    Albumin 01/16/2020 4.5  3.5 - 5.0 g/dL Final    A-G Ratio 01/16/2020 1.7  1.1 - 2.6 ratio Final    Globulin 01/16/2020 2.6  2.0 - 4.0 g/dL Final    Anion gap 01/16/2020 11.0  mmol/L Final    Comment: Test includes Albumin, Alkaline Phosphatase, ALT, AST, BUN, Calcium, CO2,  Chloride, Creatinine, Glucose, Potassium, Sodium, Total Bilirubin and Total  Protein. Estimated GFR results are reported in mL/min/1.73 sq.m. by the MDRD equation. This eGFR is validated for stable chronic renal failure patients. This   equation  is unreliable in acute illness or patients with normal renal function.       GFRAA 01/16/2020 >60.0  >60.0 Final    GFRNA 01/16/2020 >60.0  >60.0 Final    T4, Free 01/16/2020 1.3  0.9 - 1.8 ng/dL Final    TSH 01/16/2020 2.26  0.27 - 4.20 mcU/mL Final          Follow-up and Dispositions    · Return in about 6 months (around 8/12/2020).

## 2020-05-26 ENCOUNTER — OFFICE VISIT (OUTPATIENT)
Dept: FAMILY MEDICINE CLINIC | Age: 60
End: 2020-05-26

## 2020-05-26 ENCOUNTER — TELEPHONE (OUTPATIENT)
Dept: FAMILY MEDICINE CLINIC | Age: 60
End: 2020-05-26

## 2020-05-26 VITALS
RESPIRATION RATE: 16 BRPM | BODY MASS INDEX: 29.81 KG/M2 | HEART RATE: 66 BPM | DIASTOLIC BLOOD PRESSURE: 70 MMHG | SYSTOLIC BLOOD PRESSURE: 114 MMHG | WEIGHT: 162 LBS | OXYGEN SATURATION: 100 % | TEMPERATURE: 97.8 F | HEIGHT: 62 IN

## 2020-05-26 DIAGNOSIS — E78.2 MIXED HYPERLIPIDEMIA: ICD-10-CM

## 2020-05-26 DIAGNOSIS — E55.9 VITAMIN D DEFICIENCY: ICD-10-CM

## 2020-05-26 DIAGNOSIS — M16.11 PRIMARY OSTEOARTHRITIS OF RIGHT HIP: ICD-10-CM

## 2020-05-26 DIAGNOSIS — E03.9 HYPOTHYROIDISM (ACQUIRED): ICD-10-CM

## 2020-05-26 DIAGNOSIS — Z01.818 PRE-OP EXAM: Primary | ICD-10-CM

## 2020-05-26 RX ORDER — LEVOTHYROXINE SODIUM 75 UG/1
TABLET ORAL
Qty: 90 TAB | Refills: 1 | Status: SHIPPED | OUTPATIENT
Start: 2020-05-26 | End: 2020-11-12

## 2020-05-26 NOTE — TELEPHONE ENCOUNTER
Called over to [facility] and received number for Pam Camarillo who is the radiologist facilitator # [phone] left message for her to return call.

## 2020-05-26 NOTE — PROGRESS NOTES
Wes Kearney     Chief Complaint   Patient presents with    Pre-op Exam     Right ARTHROPLASTY HIP REPLACEMENT w/ Dr. Betsy Mercer [facility]      Vitals:    20 1402   BP: 114/70   Pulse: 66   Resp: 16   Temp: 97.8 °F (36.6 °C)   TempSrc: Oral   SpO2: 100%   Weight: 162 lb (73.5 kg)   Height: 5' 2\" (1.575 m)   PainSc:   0 - No pain         HPI: Patient has no acute complaint today she is here for preop clearance on  of her right hip replacement. Blood work urinalysis, chest x-ray and EKG  Results  has been reviewed all results are within normal limit, it was done on May 20 at care everywhere. Patient need medication refill levothyroxine    She has hyperlipidemia she is on simvastatin 10 mg daily LDL slightly elevated at 113 last time despite lifestyle modifications. Past Medical History:   Diagnosis Date    Macular degeneration      No past surgical history on file. Social History     Tobacco Use    Smoking status: Former Smoker     Years: 10.00     Types: Cigarettes     Last attempt to quit: 1995     Years since quittin.5    Smokeless tobacco: Never Used   Substance Use Topics    Alcohol use: Yes     Comment: occassionally       Family History   Problem Relation Age of Onset    No Known Problems Mother     No Known Problems Father        Review of Systems   Constitutional: Negative for chills, fever, malaise/fatigue and weight loss. HENT: Negative for congestion, ear discharge, ear pain, hearing loss, nosebleeds, sinus pain and sore throat. Eyes: Negative for blurred vision, double vision and discharge. Respiratory: Negative for cough, hemoptysis, shortness of breath and wheezing. Cardiovascular: Negative for chest pain, palpitations, claudication and leg swelling. Gastrointestinal: Negative for abdominal pain, constipation, diarrhea, nausea and vomiting. Genitourinary: Negative for dysuria, frequency and urgency.    Musculoskeletal: Positive for joint pain. Negative for myalgias. Skin: Negative for itching and rash. Neurological: Negative for dizziness, tingling, sensory change, speech change, focal weakness, weakness and headaches. Psychiatric/Behavioral: Negative for depression and suicidal ideas. The patient is not nervous/anxious and does not have insomnia. Physical Exam  Constitutional:       General: She is not in acute distress. Appearance: She is well-developed. She is not diaphoretic. HENT:      Head: Normocephalic and atraumatic. Mouth/Throat:      Pharynx: No oropharyngeal exudate. Eyes:      General: No scleral icterus. Right eye: No discharge. Left eye: No discharge. Conjunctiva/sclera: Conjunctivae normal.      Pupils: Pupils are equal, round, and reactive to light. Neck:      Thyroid: No thyromegaly. Cardiovascular:      Rate and Rhythm: Normal rate and regular rhythm. Heart sounds: Normal heart sounds. Pulmonary:      Effort: Pulmonary effort is normal. No respiratory distress. Breath sounds: Normal breath sounds. No wheezing or rales. Chest:      Chest wall: No tenderness. Abdominal:      General: There is no distension. Palpations: Abdomen is soft. Tenderness: There is no abdominal tenderness. There is no rebound. Musculoskeletal:         General: Tenderness present. No deformity. Comments: Right hip pain with range of motion    Lymphadenopathy:      Cervical: No cervical adenopathy. Skin:     General: Skin is warm and dry. Coloration: Skin is not pale. Findings: No erythema or rash. Neurological:      Mental Status: She is alert and oriented to person, place, and time. Cranial Nerves: No cranial nerve deficit. Coordination: Coordination normal.   Psychiatric:         Behavior: Behavior normal.         Thought Content:  Thought content normal.         Judgment: Judgment normal.          Assessment and plan     Plan of care has been discussed with the patient, he agrees to the plan and verbalized understanding. All his questions were answered  More than 50% of the time spent in this visit was counseling the patient about  illness and treatment options         1. Hypothyroidism (acquired)  TSH  is within normal limit  - levothyroxine (SYNTHROID) 75 mcg tablet; take 1 tablet by mouth once daily (BEFORE BREAKFAST)  Dispense: 90 Tab; Refill: 1    2. Mixed hyperlipidemia  Patient is on simvastatin 10 mg daily    Consider increasing to 20 mg in the future    3. Vitamin D deficiency  Continue supplements of vitamin D    4. Pre-op exam  Patient is medically cleared for surgery of right hip total replacement to [facility] on June 18, 2021      Primary osteoarthritis of right hip [M16.11]    Current Outpatient Medications   Medication Sig Dispense Refill    levothyroxine (SYNTHROID) 75 mcg tablet take 1 tablet by mouth once daily (BEFORE BREAKFAST) 90 Tab 1    Biotin 2,500 mcg cap Take  by mouth.  simvastatin (ZOCOR) 10 mg tablet take 1 tablet by mouth NIGHTLY 90 Tab 1    ASCORBIC ACID/VITAMIN E/BIOTIN (HAIR, SKIN, NAILS WITH BIOTIN PO) Take 1 Cap by mouth daily.  CHOLECALCIFEROL, VITAMIN D3, (VITAMIN D3 PO) Take 2,000 Units by mouth daily.  omega-3 fatty acids (FISH OIL) cap Take 3,000 mg by mouth daily.  Glucosamine &Chondroit-MV-Min3 423-833-45-0.5 mg tab Take 1 Tab by mouth daily.          Patient Active Problem List    Diagnosis Date Noted    Carpal tunnel syndrome 09/19/2019    Neck pain 09/19/2019    Myofascial pain 11/15/2017    Vitamin D deficiency 01/29/2016    Vitamin B12 deficiency 01/29/2016    Insomnia 01/29/2016    Hyperlipidemia 06/04/2015    Hypothyroidism 06/04/2015    Cervical radicular pain 12/21/2011    Cervicalgia 12/21/2011     Results for orders placed or performed in visit on 01/16/20   LIPID PANEL   Result Value Ref Range    Triglyceride 68 40 - 149 mg/dL    HDL Cholesterol 66 >=40 mg/dL    Cholesterol, total 192 110 - 200 mg/dL    CHOLESTEROL/HDL 2.9 0.0 - 5.0    Non-HDL Cholesterol 126 <130 mg/dL    LDL, calculated 113 (H) 50 - 99 mg/dL    VLDL, calculated 14 8 - 30 mg/dL    LDL/HDL Ratio 1.7    METABOLIC PANEL, COMPREHENSIVE   Result Value Ref Range    Glucose 94 70 - 99 mg/dL    BUN 15 6 - 22 mg/dL    Creatinine 0.6 (L) 0.8 - 1.4 mg/dL    Sodium 144 133 - 145 mmol/L    Potassium 4.5 3.5 - 5.5 mmol/L    Chloride 106 98 - 110 mmol/L    CO2 27 20 - 32 mmol/L    AST (SGOT) 14 10 - 37 U/L    ALT (SGPT) 13 5 - 40 U/L    Alk. phosphatase 63 40 - 120 U/L    Bilirubin, total 0.4 0.2 - 1.2 mg/dL    Calcium 9.8 8.4 - 10.5 mg/dL    Protein, total 7.1 6.2 - 8.1 g/dL    Albumin 4.5 3.5 - 5.0 g/dL    A-G Ratio 1.7 1.1 - 2.6 ratio    Globulin 2.6 2.0 - 4.0 g/dL    Anion gap 11.0 mmol/L    GFRAA >60.0 >60.0    GFRNA >60.0 >60.0   T4, FREE   Result Value Ref Range    T4, Free 1.3 0.9 - 1.8 ng/dL   TSH 3RD GENERATION   Result Value Ref Range    TSH 2.26 0.27 - 4.20 mcU/mL     No visits with results within 3 Month(s) from this visit. Latest known visit with results is:   Orders Only on 01/16/2020   Component Date Value Ref Range Status    Triglyceride 01/16/2020 68  40 - 149 mg/dL Final    HDL Cholesterol 01/16/2020 66  >=40 mg/dL Final    Cholesterol, total 01/16/2020 192  110 - 200 mg/dL Final    CHOLESTEROL/HDL 01/16/2020 2.9  0.0 - 5.0 Final    Non-HDL Cholesterol 01/16/2020 126  <130 mg/dL Final    LDL, calculated 01/16/2020 113* 50 - 99 mg/dL Final    VLDL, calculated 01/16/2020 14  8 - 30 mg/dL Final    LDL/HDL Ratio 01/16/2020 1.7   Final    Comment: Test includes cholesterol, HDL cholesterol, triglycerides and LDL. Cholesterol Recommended NCEP guidelines in mg/dL:  Less than 200            Desirable  200 - 239                Borderline High  Greater than or  = 240   High  Please Note:  Total Chol/HDL Ratio                   Men     Women  1/2 Avg. Risk    3.4     3.3      Avg.  Risk 5.0     4.4  2X  Avg. Risk    9.6     7.1  3X  Avg. Risk   23.4    11.0      Glucose 01/16/2020 94  70 - 99 mg/dL Final    BUN 01/16/2020 15  6 - 22 mg/dL Final    Creatinine 01/16/2020 0.6* 0.8 - 1.4 mg/dL Final    Sodium 01/16/2020 144  133 - 145 mmol/L Final    Potassium 01/16/2020 4.5  3.5 - 5.5 mmol/L Final    Chloride 01/16/2020 106  98 - 110 mmol/L Final    CO2 01/16/2020 27  20 - 32 mmol/L Final    AST (SGOT) 01/16/2020 14  10 - 37 U/L Final    ALT (SGPT) 01/16/2020 13  5 - 40 U/L Final    Alk. phosphatase 01/16/2020 63  40 - 120 U/L Final    Bilirubin, total 01/16/2020 0.4  0.2 - 1.2 mg/dL Final    Calcium 01/16/2020 9.8  8.4 - 10.5 mg/dL Final    Protein, total 01/16/2020 7.1  6.2 - 8.1 g/dL Final    Albumin 01/16/2020 4.5  3.5 - 5.0 g/dL Final    A-G Ratio 01/16/2020 1.7  1.1 - 2.6 ratio Final    Globulin 01/16/2020 2.6  2.0 - 4.0 g/dL Final    Anion gap 01/16/2020 11.0  mmol/L Final    Comment: Test includes Albumin, Alkaline Phosphatase, ALT, AST, BUN, Calcium, CO2,  Chloride, Creatinine, Glucose, Potassium, Sodium, Total Bilirubin and Total  Protein. Estimated GFR results are reported in mL/min/1.73 sq.m. by the MDRD equation. This eGFR is validated for stable chronic renal failure patients. This   equation  is unreliable in acute illness or patients with normal renal function.       GFRAA 01/16/2020 >60.0  >60.0 Final    GFRNA 01/16/2020 >60.0  >60.0 Final    T4, Free 01/16/2020 1.3  0.9 - 1.8 ng/dL Final    TSH 01/16/2020 2.26  0.27 - 4.20 mcU/mL Final

## 2020-05-26 NOTE — PROGRESS NOTES
Kd Whyte is a 63 y.o. female (: 1960) presenting to address:    Chief Complaint   Patient presents with    Pre-op Exam     Right ARTHROPLASTY HIP REPLACEMENT w/ Dr. Kyree Myers [address] [facility]        Vitals:    20 1402   BP: 114/70   Pulse: 66   Resp: 16   Temp: 97.8 °F (36.6 °C)   TempSrc: Oral   SpO2: 100%   Weight: 162 lb (73.5 kg)   Height: 5' 2\" (1.575 m)   PainSc:   0 - No pain       Hearing/Vision:   No exam data present    Learning Assessment:     Learning Assessment 2019   PRIMARY LEARNER Patient   PRIMARY LANGUAGE ENGLISH   LEARNER PREFERENCE PRIMARY PICTURES     LISTENING     READING     DEMONSTRATION     VIDEOS   ANSWERED BY patient   RELATIONSHIP SELF     Depression Screening:     3 most recent PHQ Screens 2020   Little interest or pleasure in doing things Not at all   Feeling down, depressed, irritable, or hopeless Not at all   Total Score PHQ 2 0     Fall Risk Assessment:     Fall Risk Assessment, last 12 mths 2020   Able to walk? Yes   Fall in past 12 months? Yes   Fall with injury? Yes   Number of falls in past 12 months 1   Fall Risk Score 2     Abuse Screening:     Abuse Screening Questionnaire 2020   Do you ever feel afraid of your partner? N   Are you in a relationship with someone who physically or mentally threatens you? N   Is it safe for you to go home? Y     Coordination of Care Questionaire:   1. Have you been to the ER, urgent care clinic since your last visit? Hospitalized since your last visit? NO    2. Have you seen or consulted any other health care providers outside of the [address] [place] [address] since your last visit? Include any pap smears or colon screening. NO    Advanced Directive:   1. Do you have an Advanced Directive? NO    2. Would you like information on Advanced Directives?  NO

## 2020-05-26 NOTE — TELEPHONE ENCOUNTER
Lelo Parker MD is a radiologist with [facility] or any one in the group     I need him to look at the chest x-ray that was done on May 20 it was read as normal     To please take another  look at the right sternoclavicular junction to see if he can notice any abnormality patient has complained about pain and tenderness? ?

## 2020-08-12 ENCOUNTER — TELEPHONE (OUTPATIENT)
Dept: FAMILY MEDICINE CLINIC | Age: 60
End: 2020-08-12

## 2020-08-12 ENCOUNTER — VIRTUAL VISIT (OUTPATIENT)
Dept: FAMILY MEDICINE CLINIC | Age: 60
End: 2020-08-12

## 2020-08-12 DIAGNOSIS — D50.8 OTHER IRON DEFICIENCY ANEMIA: Primary | ICD-10-CM

## 2020-08-12 DIAGNOSIS — E03.9 HYPOTHYROIDISM (ACQUIRED): ICD-10-CM

## 2020-08-12 DIAGNOSIS — E78.2 MIXED HYPERLIPIDEMIA: ICD-10-CM

## 2020-08-12 DIAGNOSIS — E55.9 VITAMIN D DEFICIENCY: ICD-10-CM

## 2020-08-12 DIAGNOSIS — D50.8 OTHER IRON DEFICIENCY ANEMIA: ICD-10-CM

## 2020-08-12 NOTE — PROGRESS NOTES
Blas Darnell is a 63 y.o. female who was seen by synchronous (real-time) audio-video technology on 8/12/2020 for No chief complaint on file. Patient is here for follow-up after a hip replacement surgery    She is doing much better with pain and mobilization    She had anemia postoperatively she took iron supplements for about a month now she is not on iron supplement and needs CBC, her hemoglobin was 8.4 on 6/19 after surgery    Also had high  blood sugar  To check  A1c    Assessment & Plan:   Diagnoses and all orders for this visit:    1. Other iron deficiency anemia  -     CBC WITH AUTOMATED DIFF; Future  -     IRON PROFILE; Future    2. Vitamin D deficiency  -     VITAMIN D, 25 HYDROXY; Future    3. Mixed hyperlipidemia    Continue statins   4. Hypothyroidism (acquired)  -     TSH AND FREE T4; Future      Weight gain   Patient had gained weight after surgery about 20 pounds as per patient    She is not able to weigh herself    She is considering a program with phentermine, she had tried that in the past , I have advised the patient about intermittent fasting and decrease carbohydrates and sugars patient states she does not have  self-control and motivation    Negative side effects of phentermine has been discussed with patient she verbalized understanding            712  Subjective:       Prior to Admission medications    Medication Sig Start Date End Date Taking? Authorizing Provider   levothyroxine (SYNTHROID) 75 mcg tablet take 1 tablet by mouth once daily (BEFORE BREAKFAST) 5/26/20  Yes Gill Yee MD   Biotin 2,500 mcg cap Take  by mouth. Yes Provider, Historical   simvastatin (ZOCOR) 10 mg tablet take 1 tablet by mouth NIGHTLY 2/12/20  Yes Gill Yee MD   ASCORBIC ACID/VITAMIN E/BIOTIN (HAIR, SKIN, NAILS WITH BIOTIN PO) Take 1 Cap by mouth daily. Yes Provider, Historical   CHOLECALCIFEROL, VITAMIN D3, (VITAMIN D3 PO) Take 2,000 Units by mouth daily.    Yes Provider, Historical omega-3 fatty acids (FISH OIL) cap Take 3,000 mg by mouth daily. Yes Provider, Historical   Glucosamine &Chondroit-MV-Min3 968-698-93-0.5 mg tab Take 1 Tab by mouth daily. Yes Provider, Historical     Patient Active Problem List   Diagnosis Code    Hyperlipidemia E78.5    Hypothyroidism E03.9    Vitamin D deficiency E55.9    Vitamin B12 deficiency E53.8    Insomnia G47.00    Carpal tunnel syndrome G56.00    Myofascial pain M79.18    Cervical radicular pain M54.12    Neck pain M54.2    Cervicalgia M54.2     Patient Active Problem List    Diagnosis Date Noted    Carpal tunnel syndrome 2019    Neck pain 2019    Myofascial pain 11/15/2017    Vitamin D deficiency 2016    Vitamin B12 deficiency 2016    Insomnia 2016    Hyperlipidemia 2015    Hypothyroidism 2015    Cervical radicular pain 2011    Cervicalgia 2011     Current Outpatient Medications   Medication Sig Dispense Refill    levothyroxine (SYNTHROID) 75 mcg tablet take 1 tablet by mouth once daily (BEFORE BREAKFAST) 90 Tab 1    Biotin 2,500 mcg cap Take  by mouth.  simvastatin (ZOCOR) 10 mg tablet take 1 tablet by mouth NIGHTLY 90 Tab 1    ASCORBIC ACID/VITAMIN E/BIOTIN (HAIR, SKIN, NAILS WITH BIOTIN PO) Take 1 Cap by mouth daily.  CHOLECALCIFEROL, VITAMIN D3, (VITAMIN D3 PO) Take 2,000 Units by mouth daily.  omega-3 fatty acids (FISH OIL) cap Take 3,000 mg by mouth daily.  Glucosamine &Chondroit-MV-Min3 770-673-88-0.5 mg tab Take 1 Tab by mouth daily. No Known Allergies  Past Medical History:   Diagnosis Date    Macular degeneration      No past surgical history on file.   Family History   Problem Relation Age of Onset    No Known Problems Mother     No Known Problems Father      Social History     Tobacco Use    Smoking status: Former Smoker     Years: 10.00     Types: Cigarettes     Last attempt to quit: 1995     Years since quittin.7    Smokeless tobacco: Never Used   Substance Use Topics    Alcohol use: Yes     Comment: occassionally       Review of Systems   Constitutional: Negative for chills, fever, malaise/fatigue and weight loss. HENT: Negative for congestion, ear discharge, ear pain, hearing loss and nosebleeds. Eyes: Negative for blurred vision, double vision and discharge. Respiratory: Negative for cough, hemoptysis, sputum production, shortness of breath and wheezing. Cardiovascular: Negative for chest pain, palpitations, claudication and leg swelling. Gastrointestinal: Negative for abdominal pain, constipation, diarrhea, nausea and vomiting. Genitourinary: Negative for dysuria, frequency and urgency. Musculoskeletal: Positive for joint pain. Negative for back pain, myalgias and neck pain. Skin: Negative for itching and rash. Neurological: Negative for dizziness, tingling, sensory change, speech change, focal weakness, weakness and headaches. Psychiatric/Behavioral: Negative for depression, hallucinations, substance abuse and suicidal ideas. The patient is not nervous/anxious. Objective:   No flowsheet data found. General: alert, cooperative, no distress   Mental  status: normal mood, behavior, speech, dress, motor activity, and thought processes, able to follow commands   HENT: NCAT   Neck: no visualized mass   Resp: no respiratory distress   Neuro: no gross deficits   Skin: no discoloration or lesions of concern on visible areas   Psychiatric: normal affect, consistent with stated mood, no evidence of hallucinations     Additional exam findings: We discussed the expected course, resolution and complications of the diagnosis(es) in detail. Medication risks, benefits, costs, interactions, and alternatives were discussed as indicated. I advised her to contact the office if her condition worsens, changes or fails to improve as anticipated. She expressed understanding with the diagnosis(es) and plan. Blessing Morton, who was evaluated through a patient-initiated, synchronous (real-time) audio-video encounter, and/or her healthcare decision maker, is aware that it is a billable service, with coverage as determined by her insurance carrier. She provided verbal consent to proceed: Yes, and patient identification was verified. It was conducted pursuant to the emergency declaration under the [address] [place] [address] and the [employer] and [employer] General Act. A caregiver was present when appropriate. Ability to conduct physical exam was limited. I was in the office. The patient was at home.       Mat Spence MD

## 2020-08-25 LAB
25(OH)D3 SERPL-MCNC: 56.4 NG/ML (ref 32–100)
ABSOLUTE LYMPHOCYTE COUNT, 10803: 1.4 K/UL (ref 1–4.8)
BASOPHILS # BLD: 0 K/UL (ref 0–0.2)
BASOPHILS NFR BLD: 0 % (ref 0–2)
EOSINOPHIL # BLD: 0 K/UL (ref 0–0.5)
EOSINOPHIL NFR BLD: 1 % (ref 0–6)
ERYTHROCYTE [DISTWIDTH] IN BLOOD BY AUTOMATED COUNT: 12.6 % (ref 10–15.5)
FE % SATURATION,PSAT: 20 % (ref 20–50)
GRANULOCYTES,GRANS: 64 % (ref 40–75)
HCT VFR BLD AUTO: 40.4 % (ref 35.1–48)
HGB BLD-MCNC: 12.5 G/DL (ref 11.7–16)
IRON,IRN: 54 MCG/DL (ref 30–160)
LYMPHOCYTES, LYMLT: 27 % (ref 20–45)
MCH RBC QN AUTO: 31 PG (ref 26–34)
MCHC RBC AUTO-ENTMCNC: 31 G/DL (ref 31–36)
MCV RBC AUTO: 101 FL (ref 81–99)
MONOCYTES # BLD: 0.4 K/UL (ref 0.1–1)
MONOCYTES NFR BLD: 8 % (ref 3–12)
NEUTROPHILS # BLD AUTO: 3.2 K/UL (ref 1.8–7.7)
PLATELET # BLD AUTO: 280 K/UL (ref 140–440)
PMV BLD AUTO: 11.9 FL (ref 9–13)
RBC # BLD AUTO: 4.02 M/UL (ref 3.8–5.2)
T4 FREE SERPL-MCNC: 1.5 NG/DL (ref 0.9–1.8)
TIBC,TIBC: 269 MCG/DL (ref 228–428)
TSH SERPL DL<=0.005 MIU/L-ACNC: 1.05 MCU/ML (ref 0.27–4.2)
UIBC SERPL-MCNC: 215 MCG/DL (ref 110–370)
WBC # BLD AUTO: 5 K/UL (ref 4–11)

## 2020-11-11 DIAGNOSIS — E03.9 HYPOTHYROIDISM (ACQUIRED): ICD-10-CM

## 2020-11-12 RX ORDER — LEVOTHYROXINE SODIUM 75 UG/1
TABLET ORAL
Qty: 90 TAB | Refills: 0 | Status: SHIPPED | OUTPATIENT
Start: 2020-11-12 | End: 2021-03-01 | Stop reason: SDUPTHER

## 2020-11-29 DIAGNOSIS — E78.5 HYPERLIPIDEMIA, UNSPECIFIED HYPERLIPIDEMIA TYPE: ICD-10-CM

## 2020-11-29 RX ORDER — SIMVASTATIN 10 MG/1
TABLET, FILM COATED ORAL
Qty: 90 TAB | Refills: 1 | Status: SHIPPED | OUTPATIENT
Start: 2020-11-29 | End: 2021-06-17

## 2021-03-01 DIAGNOSIS — E03.9 HYPOTHYROIDISM (ACQUIRED): ICD-10-CM

## 2021-03-01 NOTE — TELEPHONE ENCOUNTER
Patient called requesting medication refill, please advise.     Requested Prescriptions     Pending Prescriptions Disp Refills    levothyroxine (SYNTHROID) 75 mcg tablet 90 Tab 0     Sig: take 1 tablet by mouth once daily BEFORE BREAKFAST

## 2021-03-02 RX ORDER — LEVOTHYROXINE SODIUM 75 UG/1
TABLET ORAL
Qty: 90 TAB | Refills: 0 | Status: SHIPPED | OUTPATIENT
Start: 2021-03-02 | End: 2021-06-09 | Stop reason: SDUPTHER

## 2021-03-02 NOTE — TELEPHONE ENCOUNTER
Last refill was 11/12/2020 qty of 90 with 0 refills. No future appointments.   Last appointment was 08/12/2020

## 2021-03-03 NOTE — TELEPHONE ENCOUNTER
Future Appointments   Date Time Provider Jermaine Caitlin   3/26/2021  2:30 PM Kemi Lebron MD [facility]

## 2021-03-26 ENCOUNTER — OFFICE VISIT (OUTPATIENT)
Dept: FAMILY MEDICINE CLINIC | Age: 61
End: 2021-03-26
Payer: COMMERCIAL

## 2021-03-26 ENCOUNTER — APPOINTMENT (OUTPATIENT)
Dept: FAMILY MEDICINE CLINIC | Age: 61
End: 2021-03-26

## 2021-03-26 VITALS
HEART RATE: 75 BPM | HEIGHT: 62 IN | OXYGEN SATURATION: 97 % | WEIGHT: 146.4 LBS | DIASTOLIC BLOOD PRESSURE: 72 MMHG | SYSTOLIC BLOOD PRESSURE: 110 MMHG | BODY MASS INDEX: 26.94 KG/M2 | TEMPERATURE: 98.4 F | RESPIRATION RATE: 16 BRPM

## 2021-03-26 DIAGNOSIS — E03.9 HYPOTHYROIDISM (ACQUIRED): ICD-10-CM

## 2021-03-26 DIAGNOSIS — E55.9 VITAMIN D DEFICIENCY: ICD-10-CM

## 2021-03-26 DIAGNOSIS — Z13.1 SCREENING FOR DIABETES MELLITUS (DM): ICD-10-CM

## 2021-03-26 DIAGNOSIS — G47.9 SLEEPING DIFFICULTIES: ICD-10-CM

## 2021-03-26 DIAGNOSIS — Z00.00 ANNUAL PHYSICAL EXAM: Primary | ICD-10-CM

## 2021-03-26 DIAGNOSIS — Z00.00 ANNUAL PHYSICAL EXAM: ICD-10-CM

## 2021-03-26 DIAGNOSIS — Z12.4 SCREENING FOR CERVICAL CANCER: ICD-10-CM

## 2021-03-26 DIAGNOSIS — F41.9 ANXIETY: ICD-10-CM

## 2021-03-26 DIAGNOSIS — E78.5 HYPERLIPIDEMIA, UNSPECIFIED HYPERLIPIDEMIA TYPE: ICD-10-CM

## 2021-03-26 PROCEDURE — 99396 PREV VISIT EST AGE 40-64: CPT | Performed by: LEGAL MEDICINE

## 2021-03-26 RX ORDER — ESCITALOPRAM OXALATE 5 MG/1
5 TABLET ORAL DAILY
Qty: 90 TAB | Refills: 0 | Status: SHIPPED | OUTPATIENT
Start: 2021-03-26 | End: 2021-06-11

## 2021-03-26 NOTE — PROGRESS NOTES
Sam Zambrano is a 57 y.o. female (: 1960) presenting to address:    Chief Complaint   Patient presents with    Complete Physical       Vitals:    21 1430   BP: 110/72   Pulse: 75   Resp: 16   Temp: 98.4 °F (36.9 °C)   TempSrc: Temporal   SpO2: 97%   Weight: 146 lb 6.4 oz (66.4 kg)   Height: 5' 2\" (1.575 m)       Is someone accompanying this pt? NO    Is the patient using any DME equipment during OV? NO    Hearing/Vision:   No exam data present    Learning Assessment:     Learning Assessment 2019   PRIMARY LEARNER Patient   PRIMARY LANGUAGE ENGLISH   LEARNER PREFERENCE PRIMARY PICTURES     LISTENING     READING     DEMONSTRATION     VIDEOS   ANSWERED BY patient   RELATIONSHIP SELF     Depression Screening:     3 most recent PHQ Screens 3/26/2021   Little interest or pleasure in doing things Not at all   Feeling down, depressed, irritable, or hopeless Not at all   Total Score PHQ 2 0     Fall Risk Assessment:     Fall Risk Assessment, last 12 mths 2020   Able to walk? Yes   Fall in past 12 months? Yes   Number of falls in past 12 months 1   Fall with injury? 1     Coordination of Care Questionaire:   1. Have you been to the ER, urgent care clinic since your last visit? Hospitalized since your last visit? NO    2. Have you seen or consulted any other health care providers outside of the [address] [place] [address] since your last visit? Include any pap smears or colon screening. Yes [facility] for hip    Advanced Directive:   1. Do you have an Advanced Directive? NO    2. Would you like information on Advanced Directives?  NO

## 2021-03-26 NOTE — PROGRESS NOTES
[facility]     Chief Complaint   Patient presents with    Complete Physical     Vitals:    21 1430   BP: 110/72   Pulse: 75   Resp: 16   Temp: 98.4 °F (36.9 °C)   TempSrc: Temporal   SpO2: 97%   Weight: 146 lb 6.4 oz (66.4 kg)   Height: 5' 2\" (1.575 m)         HPI: Patient is here for annual physical examination, he has been doing well with no complaints, she recovered well after hip replacement surgery. Adherent to all medications that will be done today    She is due for Pap smear  Patient has chronic insomnia secondary to possible underlying anxiety, she is having problems sleeping and staying asleep, and sometimes she does have Benadryl on top of sleep and when she wakes up she cannot fall asleep     Past Medical History:   Diagnosis Date    Macular degeneration      No past surgical history on file. Social History     Tobacco Use    Smoking status: Former Smoker     Years: 10.00     Types: Cigarettes     Quit date: 1995     Years since quittin.3    Smokeless tobacco: Never Used   Substance Use Topics    Alcohol use: Yes     Comment: occassionally       Family History   Problem Relation Age of Onset    No Known Problems Mother     No Known Problems Father        Review of Systems   Constitutional: Negative for chills, fever, malaise/fatigue and weight loss. HENT: Negative for congestion, ear discharge, ear pain, hearing loss, nosebleeds, sinus pain and sore throat. Eyes: Negative for blurred vision, double vision and discharge. Respiratory: Negative for cough, hemoptysis, sputum production, shortness of breath and wheezing. Cardiovascular: Negative for chest pain, palpitations, claudication and leg swelling. Gastrointestinal: Negative for abdominal pain, constipation, diarrhea, nausea and vomiting. Genitourinary: Negative for dysuria, frequency and urgency. Musculoskeletal: Negative for back pain, joint pain, myalgias and neck pain.    Skin: Negative for itching and rash. Neurological: Negative for dizziness, tingling, sensory change, speech change, focal weakness, weakness and headaches. Psychiatric/Behavioral: Negative for depression, hallucinations, memory loss, substance abuse and suicidal ideas. The patient is nervous/anxious and has insomnia. Physical Exam  Constitutional:       General: She is not in acute distress. Appearance: Normal appearance. She is well-developed. She is not diaphoretic. HENT:      Head: Normocephalic and atraumatic. Eyes:      General: No scleral icterus. Right eye: No discharge. Left eye: No discharge. Conjunctiva/sclera: Conjunctivae normal.   Neck:      Thyroid: No thyromegaly. Cardiovascular:      Rate and Rhythm: Normal rate and regular rhythm. Heart sounds: Normal heart sounds. Pulmonary:      Effort: Pulmonary effort is normal. No respiratory distress. Breath sounds: Normal breath sounds. No wheezing or rales. Chest:      Chest wall: No tenderness. Abdominal:      General: There is no distension. Palpations: Abdomen is soft. Tenderness: There is no abdominal tenderness. There is no rebound. Musculoskeletal: Normal range of motion. General: No tenderness or deformity. Lymphadenopathy:      Cervical: No cervical adenopathy. Skin:     General: Skin is warm and dry. Coloration: Skin is not pale. Findings: No erythema or rash. Neurological:      Mental Status: She is alert and oriented to person, place, and time. Cranial Nerves: No cranial nerve deficit. Coordination: Coordination normal.   Psychiatric:         Behavior: Behavior normal.         Thought Content:  Thought content normal.         Judgment: Judgment normal.          Pap  Smear   Patient was here apprehensive could not connect speculum, Pap smear was obtained with a small thrush diabetes so my finger without direct visualization of cervix  As well as no tenderness on pelvic exam    Assessment and plan     Plan of care has been discussed with the patient, he agrees to the plan and verbalized understanding. All his questions were answered  More than 50% of the time spent in this visit was counseling the patient about  illness and treatment options         1. Hypothyroidism (acquired)    - TSH AND FREE T4; Future    2. Hyperlipidemia, unspecified hyperlipidemia type      3. Vitamin D deficiency    - VITAMIN D, 25 HYDROXY; Future    4. Annual physical exam    - CBC WITH AUTOMATED DIFF; Future  - METABOLIC PANEL, COMPREHENSIVE; Future  - LIPID PANEL; Future    5. Sleeping difficulties    - escitalopram oxalate (LEXAPRO) 5 mg tablet; Take 1 Tab by mouth daily for 90 days. Dispense: 90 Tab; Refill: 0    6. Anxiety  Agrees   to start lexapro  - escitalopram oxalate (LEXAPRO) 5 mg tablet; Take 1 Tab by mouth daily for 90 days. Dispense: 90 Tab; Refill: 0    7. Screening for diabetes mellitus (DM)    - HEMOGLOBIN A1C W/O EAG; Future    8. Screening for cervical cancer    - PAP IG, APTIMA HPV AND RFX 16/18,45 ([id number]); Future  - PAP IG, APTIMA HPV AND RFX 16/18,45 ([id number])    Current Outpatient Medications   Medication Sig Dispense Refill    multivit-min/FA/lutein/zeaxant (MACULAR VITAMIN PO) Take  by mouth.  escitalopram oxalate (LEXAPRO) 5 mg tablet Take 1 Tab by mouth daily for 90 days. 90 Tab 0    levothyroxine (SYNTHROID) 75 mcg tablet take 1 tablet by mouth once daily BEFORE BREAKFAST 90 Tab 0    simvastatin (ZOCOR) 10 mg tablet take 1 tablet by mouth nightly 90 Tab 1    Biotin 2,500 mcg cap Take  by mouth.  ASCORBIC ACID/VITAMIN E/BIOTIN (HAIR, SKIN, NAILS WITH BIOTIN PO) Take 1 Cap by mouth daily.  CHOLECALCIFEROL, VITAMIN D3, (VITAMIN D3 PO) Take 2,000 Units by mouth daily.  omega-3 fatty acids (FISH OIL) cap Take 3,000 mg by mouth daily.  Glucosamine &Chondroit-MV-Min3 587-197-10-0.5 mg tab Take 1 Tab by mouth daily.          Patient Active Problem List    Diagnosis Date Noted    Carpal tunnel syndrome 09/19/2019    Neck pain 09/19/2019    Myofascial pain 11/15/2017    Vitamin D deficiency 01/29/2016    Vitamin B12 deficiency 01/29/2016    Insomnia 01/29/2016    Hyperlipidemia 06/04/2015    Hypothyroidism 06/04/2015    Cervical radicular pain 12/21/2011    Cervicalgia 12/21/2011     Results for orders placed or performed in visit on 03/26/21   T4, FREE   Result Value Ref Range    T4, Free 1.4 0.9 - 1.8 ng/dL   TSH 3RD GENERATION   Result Value Ref Range    TSH 1.81 0.27 - 4.20 mcU/mL     Appointment on 03/26/2021   Component Date Value Ref Range Status    Hemoglobin A1c 03/26/2021 5.5  4.8 - 5.6 % Final    AVG GLU 03/26/2021 111  91 - 123 mg/dL Final    Comment: 5.7 - 6.4% is indicative of prediabetes. > 6.4% is indicative of diabetes.  VITAMIN D, 25-HYDROXY 03/26/2021 77.5  32.0 - 100.0 ng/mL Final    Triglyceride 03/26/2021 67  40 - 149 mg/dL Final    HDL Cholesterol 03/26/2021 57  >=40 mg/dL Final    Cholesterol, total 03/26/2021 153  110 - 200 mg/dL Final    CHOLESTEROL/HDL 03/26/2021 2.7  0.0 - 5.0 Final    Non-HDL Cholesterol 03/26/2021 96  <130 mg/dL Final    LDL, calculated 03/26/2021 82  50 - 99 mg/dL Final    VLDL, calculated 03/26/2021 13  8 - 30 mg/dL Final    LDL/HDL Ratio 03/26/2021 1.4   Final    Comment: Test includes cholesterol, HDL cholesterol, triglycerides and LDL. Cholesterol Recommended NCEP guidelines in mg/dL:  Less than 200            Desirable  200 - 239                Borderline High  Greater than or  = 240   High  Please Note:  Total Chol/HDL Ratio                   Men     Women  1/2 Avg. Risk    3.4     3.3      Avg. Risk    5.0     4.4  2X  Avg. Risk    9.6     7.1  3X  Avg.  Risk   23.4    11.0      Glucose 03/26/2021 101* 70 - 99 mg/dL Final    BUN 03/26/2021 19  6 - 22 mg/dL Final    Creatinine 03/26/2021 0.6* 0.8 - 1.4 mg/dL Final    Sodium 03/26/2021 141  133 - 145 mmol/L Final    Potassium 03/26/2021 4.4  3.5 - 5.5 mmol/L Final    Chloride 03/26/2021 104  98 - 110 mmol/L Final    CO2 03/26/2021 26  20 - 32 mmol/L Final    AST (SGOT) 03/26/2021 17  10 - 37 U/L Final    ALT (SGPT) 03/26/2021 12  5 - 40 U/L Final    Alk. phosphatase 03/26/2021 64  40 - 120 U/L Final    Bilirubin, total 03/26/2021 0.2  0.2 - 1.2 mg/dL Final    Calcium 03/26/2021 9.9  8.4 - 10.5 mg/dL Final    Protein, total 03/26/2021 6.6  6.2 - 8.1 g/dL Final    Albumin 03/26/2021 4.4  3.5 - 5.0 g/dL Final    A-G Ratio 03/26/2021 2.0  1.1 - 2.6 ratio Final    Globulin 03/26/2021 2.2  2.0 - 4.0 g/dL Final    Anion gap 03/26/2021 11.0  3.0 - 15.0 mmol/L Final    Comment: Anion Gap calculation based on electrolyte reference ranges. Test includes Albumin, Alkaline Phosphatase, ALT, AST, BUN, Calcium, CO2,  Chloride, Creatinine, Glucose, Potassium, Sodium, Total Bilirubin and Total  Protein. Estimated GFR results are reported in mL/min/1.73 sq.m. by the MDRD equation. This eGFR is validated for stable chronic renal failure patients. This   equation  is unreliable in acute illness or patients with normal renal function.  GFRAA 03/26/2021 >60.0  >60.0 Final    GFRNA 03/26/2021 >60.0  >60.0 Final    WBC 03/26/2021 5.3  4.0 - 11.0 K/uL Final    RBC 03/26/2021 4.03  3.80 - 5.20 M/uL Final    HGB 03/26/2021 12.9  11.7 - 16.0 g/dL Final    HCT 03/26/2021 40.5  35.1 - 48.0 % Final    MCV 03/26/2021 101* 81 - 99 fL Final    MCH 03/26/2021 32  26 - 34 pg Final    MCHC 03/26/2021 32  31 - 36 g/dL Final    RDW 03/26/2021 12.2  10.0 - 15.5 % Final    PLATELET 76/69/6590 093  140 - 440 K/uL Final    MPV 03/26/2021 12.0  9.0 - 13.0 fL Final    NEUTROPHILS 03/26/2021 58  40 - 75 % Final    Lymphocytes 03/26/2021 32  20 - 45 % Final    MONOCYTES 03/26/2021 7  3 - 12 % Final    EOSINOPHILS 03/26/2021 1  0 - 6 % Final    BASOPHILS 03/26/2021 1  0 - 2 % Final    ABS.  NEUTROPHILS 03/26/2021 3.1  1.8 - 7.7 K/uL Final    ABSOLUTE LYMPHOCYTE COUNT 03/26/2021 1.7  1.0 - 4.8 K/uL Final    ABS. MONOCYTES 03/26/2021 0.4  0.1 - 1.0 K/uL Final    ABS. EOSINOPHILS 03/26/2021 0.1  0.0 - 0.5 K/uL Final    ABS. BASOPHILS 03/26/2021 0.0  0.0 - 0.2 K/uL Final   Office Visit on 03/26/2021   Component Date Value Ref Range Status    T4, Free 03/26/2021 1.4  0.9 - 1.8 ng/dL Final    TSH 03/26/2021 1.81  0.27 - 4.20 mcU/mL Final          Follow-up and Dispositions    · Return in about 3 months (around 6/26/2021).

## 2021-03-27 LAB
25(OH)D3 SERPL-MCNC: 77.5 NG/ML (ref 32–100)
A-G RATIO,AGRAT: 2 RATIO (ref 1.1–2.6)
ABSOLUTE LYMPHOCYTE COUNT, 10803: 1.7 K/UL (ref 1–4.8)
ALBUMIN SERPL-MCNC: 4.4 G/DL (ref 3.5–5)
ALP SERPL-CCNC: 64 U/L (ref 40–120)
ALT SERPL-CCNC: 12 U/L (ref 5–40)
ANION GAP SERPL CALC-SCNC: 11 MMOL/L (ref 3–15)
AST SERPL W P-5'-P-CCNC: 17 U/L (ref 10–37)
AVG GLU, 10930: 111 MG/DL (ref 91–123)
BASOPHILS # BLD: 0 K/UL (ref 0–0.2)
BASOPHILS NFR BLD: 1 % (ref 0–2)
BILIRUB SERPL-MCNC: 0.2 MG/DL (ref 0.2–1.2)
BUN SERPL-MCNC: 19 MG/DL (ref 6–22)
CALCIUM SERPL-MCNC: 9.9 MG/DL (ref 8.4–10.5)
CHLORIDE SERPL-SCNC: 104 MMOL/L (ref 98–110)
CHOLEST SERPL-MCNC: 153 MG/DL (ref 110–200)
CO2 SERPL-SCNC: 26 MMOL/L (ref 20–32)
CREAT SERPL-MCNC: 0.6 MG/DL (ref 0.8–1.4)
EOSINOPHIL # BLD: 0.1 K/UL (ref 0–0.5)
EOSINOPHIL NFR BLD: 1 % (ref 0–6)
ERYTHROCYTE [DISTWIDTH] IN BLOOD BY AUTOMATED COUNT: 12.2 % (ref 10–15.5)
GFRAA, 66117: >60
GFRNA, 66118: >60
GLOBULIN,GLOB: 2.2 G/DL (ref 2–4)
GLUCOSE SERPL-MCNC: 101 MG/DL (ref 70–99)
GRANULOCYTES,GRANS: 58 % (ref 40–75)
HBA1C MFR BLD HPLC: 5.5 % (ref 4.8–5.6)
HCT VFR BLD AUTO: 40.5 % (ref 35.1–48)
HDLC SERPL-MCNC: 2.7 MG/DL (ref 0–5)
HDLC SERPL-MCNC: 57 MG/DL
HGB BLD-MCNC: 12.9 G/DL (ref 11.7–16)
LDL/HDL RATIO,LDHD: 1.4
LDLC SERPL CALC-MCNC: 82 MG/DL (ref 50–99)
LYMPHOCYTES, LYMLT: 32 % (ref 20–45)
MCH RBC QN AUTO: 32 PG (ref 26–34)
MCHC RBC AUTO-ENTMCNC: 32 G/DL (ref 31–36)
MCV RBC AUTO: 101 FL (ref 81–99)
MONOCYTES # BLD: 0.4 K/UL (ref 0.1–1)
MONOCYTES NFR BLD: 7 % (ref 3–12)
NEUTROPHILS # BLD AUTO: 3.1 K/UL (ref 1.8–7.7)
NON-HDL CHOLESTEROL, 011976: 96 MG/DL
PLATELET # BLD AUTO: 266 K/UL (ref 140–440)
PMV BLD AUTO: 12 FL (ref 9–13)
POTASSIUM SERPL-SCNC: 4.4 MMOL/L (ref 3.5–5.5)
PROT SERPL-MCNC: 6.6 G/DL (ref 6.2–8.1)
RBC # BLD AUTO: 4.03 M/UL (ref 3.8–5.2)
SODIUM SERPL-SCNC: 141 MMOL/L (ref 133–145)
T4 FREE SERPL-MCNC: 1.4 NG/DL (ref 0.9–1.8)
TRIGL SERPL-MCNC: 67 MG/DL (ref 40–149)
TSH SERPL DL<=0.005 MIU/L-ACNC: 1.81 MCU/ML (ref 0.27–4.2)
VLDLC SERPL CALC-MCNC: 13 MG/DL (ref 8–30)
WBC # BLD AUTO: 5.3 K/UL (ref 4–11)

## 2021-03-31 LAB
HPV GENOTYPE, 16, 507501: POSITIVE
HPV GENOTYPE, 18, 507502: NEGATIVE
HPV HIGH RISK, 507303: POSITIVE
PAP IMAGE GUIDED, 8900296: NORMAL

## 2021-04-12 ENCOUNTER — TELEPHONE (OUTPATIENT)
Dept: FAMILY MEDICINE CLINIC | Age: 61
End: 2021-04-12

## 2021-04-12 DIAGNOSIS — R87.810 PAPANICOLAOU SMEAR OF CERVIX WITH POSITIVE HIGH RISK HUMAN PAPILLOMA VIRUS (HPV) TEST: Primary | ICD-10-CM

## 2021-04-12 NOTE — TELEPHONE ENCOUNTER
Pt got letter in the mail stating she is positive for hpv and asked what she should do in the meantime. Please advise.  Told pt that she can follow up with gyn

## 2021-04-12 NOTE — TELEPHONE ENCOUNTER
Pt called back to request a referral to see Dr. Jacob Tello. They also are requesting the pathology report from the [facility].    Génesis Salazar - [phone]

## 2021-04-14 ENCOUNTER — TELEPHONE (OUTPATIENT)
Dept: FAMILY MEDICINE CLINIC | Age: 61
End: 2021-04-14

## 2021-04-14 NOTE — TELEPHONE ENCOUNTER
Pt called back with a better fax number for Dr Mariel Greco to fax the referral and notes to.  Please advise     Fax [phone] ita salomon

## 2021-06-09 DIAGNOSIS — E03.9 HYPOTHYROIDISM (ACQUIRED): ICD-10-CM

## 2021-06-09 NOTE — TELEPHONE ENCOUNTER
PT called in for a refill     Requested Prescriptions     Pending Prescriptions Disp Refills    levothyroxine (SYNTHROID) 75 mcg tablet 90 Tablet 0     Sig: take 1 tablet by mouth once daily BEFORE BREAKFAST

## 2021-06-10 RX ORDER — LEVOTHYROXINE SODIUM 75 UG/1
TABLET ORAL
Qty: 90 TABLET | Refills: 0 | Status: SHIPPED | OUTPATIENT
Start: 2021-06-10 | End: 2021-09-14 | Stop reason: SDUPTHER

## 2021-06-10 NOTE — TELEPHONE ENCOUNTER
Last refill was 03/02/2021 qty of 90 with 0 refills. No future appointments.   Last appointment was 03/26/2021

## 2021-06-11 ENCOUNTER — VIRTUAL VISIT (OUTPATIENT)
Dept: FAMILY MEDICINE CLINIC | Age: 61
End: 2021-06-11
Payer: COMMERCIAL

## 2021-06-11 DIAGNOSIS — J30.9 ALLERGIC RHINITIS, UNSPECIFIED SEASONALITY, UNSPECIFIED TRIGGER: ICD-10-CM

## 2021-06-11 DIAGNOSIS — R09.81 SINUS CONGESTION: Primary | ICD-10-CM

## 2021-06-11 PROCEDURE — 99213 OFFICE O/P EST LOW 20 MIN: CPT | Performed by: LEGAL MEDICINE

## 2021-06-11 RX ORDER — AZELASTINE 1 MG/ML
2 SPRAY, METERED NASAL 2 TIMES DAILY
Qty: 1 BOTTLE | Refills: 0 | Status: SHIPPED | OUTPATIENT
Start: 2021-06-11 | End: 2021-07-11

## 2021-06-11 RX ORDER — PREDNISONE 10 MG/1
TABLET ORAL
COMMUNITY
Start: 2021-06-04 | End: 2021-06-24 | Stop reason: ALTCHOICE

## 2021-06-11 RX ORDER — MINERAL OIL
180 ENEMA (ML) RECTAL
Qty: 30 TABLET | Refills: 1 | Status: SHIPPED | OUTPATIENT
Start: 2021-06-11 | End: 2021-07-11

## 2021-06-11 NOTE — PROGRESS NOTES
Lora Aguiar is a 57 y.o. female who was seen by synchronous (real-time) audio-video technology on 6/11/2021 for Sinus Infection and Cough    Nasal congestion , facial discomfort ,sinus pain ,postnasl drip    symptoms for 3 days , no fever no chills ,no loss of smell or taste , no body aches, taking mucinex sinus with little help      Assessment & Plan:   Diagnoses and all orders for this visit:    1. Sinus congestion  -     azelastine (ASTELIN) 137 mcg (0.1 %) nasal spray; 2 Sprays by Both Nostrils route two (2) times a day for 30 days. Use in each nostril as directed  -     fexofenadine (ALLEGRA) 180 mg tablet; Take 1 Tablet by mouth daily as needed for Allergies for up to 30 days. 2. Allergic rhinitis, unspecified seasonality, unspecified trigger  -     azelastine (ASTELIN) 137 mcg (0.1 %) nasal spray; 2 Sprays by Both Nostrils route two (2) times a day for 30 days. Use in each nostril as directed  -     fexofenadine (ALLEGRA) 180 mg tablet; Take 1 Tablet by mouth daily as needed for Allergies for up to 30 days. 712  Subjective:       Prior to Admission medications    Medication Sig Start Date End Date Taking? Authorizing Provider   predniSONE (DELTASONE) 10 mg tablet take 4 tablets once daily for 3 days then 3 tablets once daily fo. ..  (REFER TO PRESCRIPTION NOTES). 6/4/21  Yes Provider, Historical   azelastine (ASTELIN) 137 mcg (0.1 %) nasal spray 2 Sprays by Both Nostrils route two (2) times a day for 30 days. Use in each nostril as directed 6/11/21 7/11/21 Yes Tai Manriquez MD   fexofenadine (ALLEGRA) 180 mg tablet Take 1 Tablet by mouth daily as needed for Allergies for up to 30 days. 6/11/21 7/11/21 Yes Tai Manriquez MD   levothyroxine (SYNTHROID) 75 mcg tablet take 1 tablet by mouth once daily BEFORE BREAKFAST 6/10/21  Yes Tai Manriquez MD   multivit-min/FA/lutein/zeaxant (MACULAR VITAMIN PO) Take  by mouth.    Yes Provider, Historical   simvastatin (ZOCOR) 10 mg tablet take 1 tablet by mouth nightly 11/29/20  Yes Laura Nieto MD   ASCORBIC ACID/VITAMIN E/BIOTIN (HAIR, SKIN, NAILS WITH BIOTIN PO) Take 1 Cap by mouth daily. Yes Provider, Historical   CHOLECALCIFEROL, VITAMIN D3, (VITAMIN D3 PO) Take 2,000 Units by mouth daily. Yes Provider, Historical   omega-3 fatty acids (FISH OIL) cap Take 3,000 mg by mouth daily. Yes Provider, Historical   Glucosamine &Chondroit-MV-Min3 106-533-28-0.5 mg tab Take 1 Tab by mouth daily. Yes Provider, Historical         ROS    Objective:   No flowsheet data found. General: alert, cooperative, no distress   Mental  status: normal mood, behavior, speech, dress, motor activity, and thought processes, able to follow commands   HENT: NCAT   Neck: no visualized mass   Resp: no respiratory distress   Neuro: no gross deficits   Skin: no discoloration or lesions of concern on visible areas   Psychiatric: normal affect, consistent with stated mood, no evidence of hallucinations     Additional exam findings: We discussed the expected course, resolution and complications of the diagnosis(es) in detail. Medication risks, benefits, costs, interactions, and alternatives were discussed as indicated. I advised her to contact the office if her condition worsens, changes or fails to improve as anticipated. She expressed understanding with the diagnosis(es) and plan. Fidelina Cardonaew, was evaluated through a synchronous (real-time) audio-video encounter. The patient (or guardian if applicable) is aware that this is a billable service. Verbal consent to proceed has been obtained within the past 12 months. The visit was conducted pursuant to the emergency declaration under the [address] [place] [address], [address] [place] [address] authority and the [employer] General Act. Patient identification was verified, and a caregiver was present when appropriate.  The patient was located in a state where the provider was credentialed to provide care.     Jorge Magallon MD

## 2021-06-11 NOTE — PROGRESS NOTES
Abbey Arriaga is a 57 y.o. female (: 1960) presenting to address:    Chief Complaint   Patient presents with    Sinus Infection    Cough       There were no vitals filed for this visit. Is someone accompanying this pt? NO    Is the patient using any DME equipment during OV? NO    Hearing/Vision:   No exam data present    Learning Assessment:     Learning Assessment 2019   PRIMARY LEARNER Patient   PRIMARY LANGUAGE ENGLISH   LEARNER PREFERENCE PRIMARY PICTURES     LISTENING     READING     DEMONSTRATION     VIDEOS   ANSWERED BY patient   RELATIONSHIP SELF     Depression Screening:     3 most recent PHQ Screens 2021   Little interest or pleasure in doing things Not at all   Feeling down, depressed, irritable, or hopeless Not at all   Total Score PHQ 2 0     Fall Risk Assessment:     Fall Risk Assessment, last 12 mths 2020   Able to walk? Yes   Fall in past 12 months? Yes   Number of falls in past 12 months 1   Fall with injury? 1     Coordination of Care Questionaire:   1. Have you been to the ER, urgent care clinic since your last visit? Hospitalized since your last visit? NO    2. Have you seen or consulted any other health care providers outside of the [address] [place] [address] since your last visit? Include any pap smears or colon screening. Yes oncology     Advanced Directive:   1. Do you have an Advanced Directive? NO    2. Would you like information on Advanced Directives?  NO

## 2021-06-16 DIAGNOSIS — E78.5 HYPERLIPIDEMIA, UNSPECIFIED HYPERLIPIDEMIA TYPE: ICD-10-CM

## 2021-06-17 RX ORDER — SIMVASTATIN 10 MG/1
TABLET, FILM COATED ORAL
Qty: 90 TABLET | Refills: 1 | Status: SHIPPED | OUTPATIENT
Start: 2021-06-17 | End: 2022-03-24

## 2021-06-24 ENCOUNTER — VIRTUAL VISIT (OUTPATIENT)
Dept: FAMILY MEDICINE CLINIC | Age: 61
End: 2021-06-24
Payer: COMMERCIAL

## 2021-06-24 DIAGNOSIS — J01.00 ACUTE MAXILLARY SINUSITIS, RECURRENCE NOT SPECIFIED: Primary | ICD-10-CM

## 2021-06-24 PROCEDURE — 99213 OFFICE O/P EST LOW 20 MIN: CPT | Performed by: LEGAL MEDICINE

## 2021-06-24 RX ORDER — AMOXICILLIN AND CLAVULANATE POTASSIUM 875; 125 MG/1; MG/1
1 TABLET, FILM COATED ORAL EVERY 12 HOURS
Qty: 20 TABLET | Refills: 0 | Status: SHIPPED | OUTPATIENT
Start: 2021-06-24 | End: 2021-07-04

## 2021-06-24 NOTE — PROGRESS NOTES
Domingo Tripathi is a 57 y.o. female (: 1960) presenting to address:    Chief Complaint   Patient presents with    Sinus Infection     Follow up       There were no vitals filed for this visit. Hearing/Vision:   No exam data present    Learning Assessment:     Learning Assessment 2019   PRIMARY LEARNER Patient   PRIMARY LANGUAGE ENGLISH   LEARNER PREFERENCE PRIMARY PICTURES     LISTENING     READING     DEMONSTRATION     VIDEOS   ANSWERED BY patient   RELATIONSHIP SELF     Depression Screening:     3 most recent PHQ Screens 2021   Little interest or pleasure in doing things Not at all   Feeling down, depressed, irritable, or hopeless Not at all   Total Score PHQ 2 0     Fall Risk Assessment:     Fall Risk Assessment, last 12 mths 2020   Able to walk? Yes   Fall in past 12 months? Yes   Number of falls in past 12 months 1   Fall with injury? 1     Abuse Screening:     Abuse Screening Questionnaire 2020   Do you ever feel afraid of your partner? N   Are you in a relationship with someone who physically or mentally threatens you? N   Is it safe for you to go home? Y     Coordination of Care Questionaire:   1. Have you been to the ER, urgent care clinic since your last visit? Hospitalized since your last visit? NO    2. Have you seen or consulted any other health care providers outside of the [address] [place] [address] since your last visit? Include any pap smears or colon screening. NO    Advanced Directive:   1. Do you have an Advanced Directive? NO    2. Would you like information on Advanced Directives?  NO

## 2021-06-24 NOTE — PROGRESS NOTES
Fabrizio Dee is a 57 y.o. female who was seen by synchronous (real-time) audio-video technology on 6/24/2021 for Sinus Infection (Follow up)    She is not feeling well , feel really tired , not able to sleep ,sinus headache , nasal congestion no fever no chills     No improvement in symptoms with antihistamines and nasal spray    Assessment & Plan:   Diagnoses and all orders for this visit:    1. Acute maxillary sinusitis, recurrence not specified  -     amoxicillin-clavulanate (AUGMENTIN) 875-125 mg per tablet; Take 1 Tablet by mouth every twelve (12) hours for 10 days. 712  Subjective:       Prior to Admission medications    Medication Sig Start Date End Date Taking? Authorizing Provider   antiox. mv no.12/omeg3s/lut/miguel angel (MACULAR BENEFITS PO) Take  by mouth. Yes Provider, Historical   amoxicillin-clavulanate (AUGMENTIN) 875-125 mg per tablet Take 1 Tablet by mouth every twelve (12) hours for 10 days. 6/24/21 7/4/21 Yes Loi Gloria MD   simvastatin (ZOCOR) 10 mg tablet take 1 tablet by mouth nightly 6/17/21  Yes Loi Gloria MD   azelastine (ASTELIN) 137 mcg (0.1 %) nasal spray 2 Sprays by Both Nostrils route two (2) times a day for 30 days. Use in each nostril as directed 6/11/21 7/11/21 Yes Loi Gloria MD   fexofenadine (ALLEGRA) 180 mg tablet Take 1 Tablet by mouth daily as needed for Allergies for up to 30 days. 6/11/21 7/11/21 Yes Loi Gloria MD   levothyroxine (SYNTHROID) 75 mcg tablet take 1 tablet by mouth once daily BEFORE BREAKFAST 6/10/21  Yes Loi Gloria MD   multivit-min/FA/lutein/zeaxant (MACULAR VITAMIN PO) Take  by mouth. Yes Provider, Historical   CHOLECALCIFEROL, VITAMIN D3, (VITAMIN D3 PO) Take 2,000 Units by mouth daily. Yes Provider, Historical   omega-3 fatty acids (FISH OIL) cap Take 3,000 mg by mouth daily. Yes Provider, Historical   Glucosamine &Chondroit-MV-Min3 895-430-35-0.5 mg tab Take 1 Tab by mouth daily.    Yes Provider, Historical   predniSONE (DELTASONE) 10 mg tablet take 4 tablets once daily for 3 days then 3 tablets once daily fo. ..  (REFER TO PRESCRIPTION NOTES). 6/4/21 6/24/21  Provider, Historical   ASCORBIC ACID/VITAMIN E/BIOTIN (HAIR, SKIN, NAILS WITH BIOTIN PO) Take 1 Cap by mouth daily. Patient not taking: Reported on 6/24/2021    Provider, Historical     Patient Active Problem List   Diagnosis Code    Hyperlipidemia E78.5    Hypothyroidism E03.9    Vitamin D deficiency E55.9    Vitamin B12 deficiency E53.8    Insomnia G47.00    Carpal tunnel syndrome G56.00    Myofascial pain M79.18    Cervical radicular pain M54.12    Neck pain M54.2    Cervicalgia M54.2     Patient Active Problem List    Diagnosis Date Noted    Carpal tunnel syndrome 09/19/2019    Neck pain 09/19/2019    Myofascial pain 11/15/2017    Vitamin D deficiency 01/29/2016    Vitamin B12 deficiency 01/29/2016    Insomnia 01/29/2016    Hyperlipidemia 06/04/2015    Hypothyroidism 06/04/2015    Cervical radicular pain 12/21/2011    Cervicalgia 12/21/2011     Current Outpatient Medications   Medication Sig Dispense Refill    antiox. mv no.12/omeg3s/lut/miguel angel (MACULAR BENEFITS PO) Take  by mouth.  amoxicillin-clavulanate (AUGMENTIN) 875-125 mg per tablet Take 1 Tablet by mouth every twelve (12) hours for 10 days. 20 Tablet 0    simvastatin (ZOCOR) 10 mg tablet take 1 tablet by mouth nightly 90 Tablet 1    azelastine (ASTELIN) 137 mcg (0.1 %) nasal spray 2 Sprays by Both Nostrils route two (2) times a day for 30 days. Use in each nostril as directed 1 Bottle 0    fexofenadine (ALLEGRA) 180 mg tablet Take 1 Tablet by mouth daily as needed for Allergies for up to 30 days. 30 Tablet 1    levothyroxine (SYNTHROID) 75 mcg tablet take 1 tablet by mouth once daily BEFORE BREAKFAST 90 Tablet 0    multivit-min/FA/lutein/zeaxant (MACULAR VITAMIN PO) Take  by mouth.  CHOLECALCIFEROL, VITAMIN D3, (VITAMIN D3 PO) Take 2,000 Units by mouth daily.  omega-3 fatty acids (FISH OIL) cap Take 3,000 mg by mouth daily.  Glucosamine &Chondroit-MV-Min3 400-550-78-0.5 mg tab Take 1 Tab by mouth daily.  ASCORBIC ACID/VITAMIN E/BIOTIN (HAIR, SKIN, NAILS WITH BIOTIN PO) Take 1 Cap by mouth daily. (Patient not taking: Reported on 2021)       No Known Allergies  Past Medical History:   Diagnosis Date    Macular degeneration      No past surgical history on file. Family History   Problem Relation Age of Onset    No Known Problems Mother     No Known Problems Father      Social History     Tobacco Use    Smoking status: Former Smoker     Years: 10.00     Types: Cigarettes     Quit date: 1995     Years since quittin.6    Smokeless tobacco: Never Used   Substance Use Topics    Alcohol use: Yes     Comment: occassionally       Review of Systems   Constitutional: Negative for chills, fever, malaise/fatigue and weight loss. HENT: Positive for congestion and sinus pain. Negative for ear discharge, ear pain, hearing loss, nosebleeds and sore throat. Eyes: Negative for blurred vision, double vision and discharge. Respiratory: Negative for cough and stridor. Cardiovascular: Negative for chest pain, palpitations, claudication and leg swelling. Gastrointestinal: Positive for nausea. Negative for abdominal pain, blood in stool, constipation, diarrhea, melena and vomiting. Genitourinary: Negative for dysuria, flank pain, frequency, hematuria and urgency. Musculoskeletal: Negative for myalgias. Skin: Negative for itching and rash. Neurological: Negative for dizziness, tingling, sensory change, speech change, focal weakness, weakness and headaches. Psychiatric/Behavioral: Negative for depression and suicidal ideas. Objective:   No flowsheet data found.    General: alert, cooperative, no distress   Mental  status: normal mood, behavior, speech, dress, motor activity, and thought processes, able to follow commands   HENT: NCAT Neck: no visualized mass   Resp: no respiratory distress   Neuro: no gross deficits   Skin: no discoloration or lesions of concern on visible areas   Psychiatric: normal affect, consistent with stated mood, no evidence of hallucinations     Additional exam findings: We discussed the expected course, resolution and complications of the diagnosis(es) in detail. Medication risks, benefits, costs, interactions, and alternatives were discussed as indicated. I advised her to contact the office if her condition worsens, changes or fails to improve as anticipated. She expressed understanding with the diagnosis(es) and plan. Uriel Bernard was evaluated through a synchronous (real-time) audio-video encounter. The patient (or guardian if applicable) is aware that this is a billable service. Verbal consent to proceed has been obtained within the past 12 months. The visit was conducted pursuant to the emergency declaration under the [facility], [address] [place] [address] authority and the [employer] General Act. Patient identification was verified, and a caregiver was present when appropriate. The patient was located in a state where the provider was credentialed to provide care.     Digna Koyanagi, MD

## 2021-09-14 DIAGNOSIS — E03.9 HYPOTHYROIDISM (ACQUIRED): ICD-10-CM

## 2021-09-14 NOTE — TELEPHONE ENCOUNTER
Wu Tiwari called for their medication refill. Last Office visit:  6/24/2021    Last Filled: 6/10/2021; Qty 90 w/ 0 refills     Follow up visit:  No future appointments.

## 2021-09-15 RX ORDER — LEVOTHYROXINE SODIUM 75 UG/1
TABLET ORAL
Qty: 90 TABLET | Refills: 0 | Status: SHIPPED | OUTPATIENT
Start: 2021-09-15 | End: 2021-12-23 | Stop reason: SDUPTHER

## 2021-12-23 DIAGNOSIS — E03.9 HYPOTHYROIDISM (ACQUIRED): ICD-10-CM

## 2021-12-23 RX ORDER — LEVOTHYROXINE SODIUM 75 UG/1
TABLET ORAL
Qty: 90 TABLET | Refills: 0 | Status: SHIPPED | OUTPATIENT
Start: 2021-12-23 | End: 2022-03-29 | Stop reason: SDUPTHER

## 2021-12-23 NOTE — TELEPHONE ENCOUNTER
Requested Prescriptions     Pending Prescriptions Disp Refills    levothyroxine (SYNTHROID) 75 mcg tablet 90 Tablet 0     Sig: take 1 tablet by mouth once daily BEFORE BREAKFAST     Pt called to request a refill because she has a week's left. Please advise. No future appointments.

## 2022-03-19 PROBLEM — G56.00 CARPAL TUNNEL SYNDROME: Status: ACTIVE | Noted: 2019-09-19

## 2022-03-20 PROBLEM — M79.18 MYOFASCIAL PAIN: Status: ACTIVE | Noted: 2017-11-15

## 2022-03-20 PROBLEM — M54.2 NECK PAIN: Status: ACTIVE | Noted: 2019-09-19

## 2022-03-24 DIAGNOSIS — E78.5 HYPERLIPIDEMIA, UNSPECIFIED HYPERLIPIDEMIA TYPE: ICD-10-CM

## 2022-03-24 RX ORDER — SIMVASTATIN 10 MG/1
TABLET, FILM COATED ORAL
Qty: 90 TABLET | Refills: 0 | Status: SHIPPED | OUTPATIENT
Start: 2022-03-24 | End: 2022-04-18 | Stop reason: SDUPTHER

## 2022-03-29 DIAGNOSIS — E03.9 HYPOTHYROIDISM (ACQUIRED): ICD-10-CM

## 2022-03-29 NOTE — TELEPHONE ENCOUNTER
Pt has futue appt    Future Appointments   Date Time Provider Jermaine Tucker   4/11/2022 11:30 AM Edson Gallegos MD [facility]

## 2022-03-30 RX ORDER — LEVOTHYROXINE SODIUM 75 UG/1
TABLET ORAL
Qty: 90 TABLET | Refills: 0 | Status: SHIPPED | OUTPATIENT
Start: 2022-03-30 | End: 2022-04-18 | Stop reason: SDUPTHER

## 2022-04-11 ENCOUNTER — OFFICE VISIT (OUTPATIENT)
Dept: FAMILY MEDICINE CLINIC | Age: 62
End: 2022-04-11
Payer: COMMERCIAL

## 2022-04-11 VITALS
TEMPERATURE: 98 F | HEART RATE: 82 BPM | SYSTOLIC BLOOD PRESSURE: 115 MMHG | WEIGHT: 181.2 LBS | DIASTOLIC BLOOD PRESSURE: 70 MMHG | BODY MASS INDEX: 33.34 KG/M2 | RESPIRATION RATE: 16 BRPM | HEIGHT: 62 IN | OXYGEN SATURATION: 94 %

## 2022-04-11 DIAGNOSIS — E55.9 VITAMIN D DEFICIENCY: ICD-10-CM

## 2022-04-11 DIAGNOSIS — E78.5 HYPERLIPIDEMIA, UNSPECIFIED HYPERLIPIDEMIA TYPE: ICD-10-CM

## 2022-04-11 DIAGNOSIS — R87.810 PAPANICOLAOU SMEAR OF CERVIX WITH POSITIVE HIGH RISK HUMAN PAPILLOMA VIRUS (HPV) TEST: ICD-10-CM

## 2022-04-11 DIAGNOSIS — E03.9 HYPOTHYROIDISM (ACQUIRED): ICD-10-CM

## 2022-04-11 DIAGNOSIS — Z13.1 SCREENING FOR DIABETES MELLITUS (DM): ICD-10-CM

## 2022-04-11 DIAGNOSIS — Z00.00 ANNUAL PHYSICAL EXAM: Primary | ICD-10-CM

## 2022-04-11 DIAGNOSIS — G56.03 BILATERAL CARPAL TUNNEL SYNDROME: ICD-10-CM

## 2022-04-11 DIAGNOSIS — Z23 ENCOUNTER FOR IMMUNIZATION: ICD-10-CM

## 2022-04-11 DIAGNOSIS — M65.311 TRIGGER FINGER OF RIGHT THUMB: ICD-10-CM

## 2022-04-11 DIAGNOSIS — Z12.4 PAP SMEAR FOR CERVICAL CANCER SCREENING: ICD-10-CM

## 2022-04-11 PROCEDURE — 99396 PREV VISIT EST AGE 40-64: CPT | Performed by: LEGAL MEDICINE

## 2022-04-11 RX ORDER — PREDNISONE 10 MG/1
TABLET ORAL
COMMUNITY
Start: 2022-04-01 | End: 2022-05-13

## 2022-04-11 NOTE — PROGRESS NOTES
Fam Warrener     Chief Complaint   Patient presents with    Physical     Vitals:    22 1137   BP: 115/70   Pulse: 82   Resp: 16   Temp: 98 °F (36.7 °C)   TempSrc: Temporal   SpO2: 94%   Weight: 181 lb 3.2 oz (82.2 kg)   Height: 5' 2\" (1.575 m)   PainSc:   4   PainLoc: Shoulder         HPI: is here for complete physical examination  She has been doing well overall, having multiple joint pain and left knee pain as well as tendinitis in her right thigh, and exacerbation of carpal tunnel syndrome bilaterally, and a trigger finger on the right thumb    Currently she taking prednisone from orthopedic for her left knee pain, plan is for her to have knee replacement in the summer    She is due for shingles vaccine second dose    To get the vaccine next visit since she is on prednisone  Past Medical History:   Diagnosis Date    Macular degeneration      No past surgical history on file. Social History     Tobacco Use    Smoking status: Former Smoker     Years: 10.00     Types: Cigarettes     Quit date: 1995     Years since quittin.4    Smokeless tobacco: Never Used   Substance Use Topics    Alcohol use: Yes     Comment: occassionally       Family History   Problem Relation Age of Onset    No Known Problems Mother     No Known Problems Father        Review of Systems   Constitutional: Negative for chills, fever, malaise/fatigue and weight loss. HENT: Negative for congestion, ear discharge, ear pain, hearing loss, nosebleeds, sinus pain and sore throat. Eyes: Negative for blurred vision, double vision and discharge. Respiratory: Negative for cough. Cardiovascular: Negative for chest pain, palpitations, claudication and leg swelling. Gastrointestinal: Negative for abdominal pain, constipation, diarrhea, nausea and vomiting. Genitourinary: Negative for dysuria, frequency and urgency. Musculoskeletal: Positive for back pain, joint pain and myalgias.  Negative for falls.   Skin: Negative for itching and rash. Neurological: Negative for dizziness, tingling, sensory change, speech change, focal weakness, weakness and headaches. Psychiatric/Behavioral: Negative for depression, hallucinations, substance abuse and suicidal ideas. Physical Exam  Vitals and nursing note reviewed. Constitutional:       General: She is not in acute distress. Appearance: She is well-developed. She is not diaphoretic. HENT:      Head: Normocephalic and atraumatic. Eyes:      General: No scleral icterus. Right eye: No discharge. Left eye: No discharge. Conjunctiva/sclera: Conjunctivae normal.      Pupils: Pupils are equal, round, and reactive to light. Neck:      Thyroid: No thyromegaly. Cardiovascular:      Rate and Rhythm: Normal rate and regular rhythm. Heart sounds: Normal heart sounds. Pulmonary:      Effort: Pulmonary effort is normal. No respiratory distress. Breath sounds: Normal breath sounds. No stridor. No wheezing, rhonchi or rales. Chest:      Chest wall: No tenderness. Abdominal:      General: Bowel sounds are normal. There is no distension. Palpations: Abdomen is soft. There is no mass. Tenderness: There is no abdominal tenderness. There is no right CVA tenderness, left CVA tenderness, guarding or rebound. Hernia: No hernia is present. Musculoskeletal:         General: Tenderness present. No deformity. Normal range of motion. Right lower leg: No edema. Left lower leg: No edema. Lymphadenopathy:      Cervical: No cervical adenopathy. Skin:     General: Skin is warm and dry. Coloration: Skin is not jaundiced or pale. Findings: No bruising, erythema, lesion or rash. Neurological:      General: No focal deficit present. Mental Status: She is alert and oriented to person, place, and time. Cranial Nerves: No cranial nerve deficit. Motor: No weakness.       Coordination: Coordination normal.      Gait: Gait normal.      Deep Tendon Reflexes: Reflexes normal.   Psychiatric:         Mood and Affect: Mood normal.         Behavior: Behavior normal.         Thought Content: Thought content normal.         Judgment: Judgment normal.            Pap Smear Procedure     After obtaining verbal consent . and patient was placed in the lithectomy position   VULVA: normal appearing vulva with no masses, tenderness or lesions, VAGINA: normal appearing vagina with normal color and discharge, no lesions, CERVIX: normal    Pap smear sample  was obtained using silicon broom . appearing cervix without discharge or lesions, UTERUS: uterus is normal size, shape, consistency and nontender, ADNEXA: normal adnexa in size, nontender and no masses. Patient tolerated procedure well   Assessment and plan     Plan of care has been discussed with the patient, he agrees to the plan and verbalized understanding. All his questions were answered  More than 50% of the time spent in this visit was counseling the patient about  illness and treatment options         1. Annual physical exam    - METABOLIC PANEL, COMPREHENSIVE; Future  - LIPID PANEL; Future  - CBC WITH AUTOMATED DIFF; Future    2. Hypothyroidism (acquired)    - TSH AND FREE T4; Future    3. Hyperlipidemia, unspecified hyperlipidemia type    She is on simvastatin 10 mg nightly  4. Screening for diabetes mellitus (DM)    - HEMOGLOBIN A1C W/O EAG; Future    5. Vitamin D deficiency  She is on vitamin D supplement 2000 units daily  - VITAMIN D, 25 HYDROXY; Future            7. Bilateral carpal tunnel syndrome  Currently she is asymptomatic advised her to wear wrist supports    8. Trigger finger of right thumb  Currently asymptomatic    9. Papanicolaou smear of cervix with positive high risk human papilloma virus (HPV) test    - PAP IG, APTIMA HPV AND RFX 16/18,45 ([id number]); Future  - PAP IG, APTIMA HPV AND RFX 16/18,45 ([id number])    10.  Pap smear for cervical cancer screening    - PAP IG, APTIMA HPV AND RFX 16/18,45 ([id number]); Future  - PAP IG, APTIMA HPV AND RFX 16/18,45 ([id number])    Current Outpatient Medications   Medication Sig Dispense Refill    glucosamine/chondr galloway A sod (OSTEO BI-FLEX PO) Take  by mouth daily.  predniSONE (DELTASONE) 10 mg tablet TAKE 4 TABLETS BY MOUTH ONCE DAILY FOR 3 DAYS, THEN TAKE 3 TABLET. ..  (REFER TO PRESCRIPTION NOTES).  multivitamin with minerals (HAIR,SKIN AND NAILS PO) Take  by mouth daily.  PNV OM.77/WIXFTRV fum/folic ac (PRENATAL PO) Take  by mouth daily. Nature made multivitamin      levothyroxine (SYNTHROID) 75 mcg tablet take 1 tablet by mouth once daily BEFORE BREAKFAST 90 Tablet 0    simvastatin (ZOCOR) 10 mg tablet take 1 tablet by mouth nightly 90 Tablet 0    antiox. mv no.12/omeg3s/lut/miguel angel (MACULAR BENEFITS PO) Take  by mouth.  multivit-min/FA/lutein/zeaxant (MACULAR VITAMIN PO) Take  by mouth.  omega-3 fatty acids (FISH OIL) cap Take 3,000 mg by mouth daily.  ASCORBIC ACID/VITAMIN E/BIOTIN (HAIR, SKIN, NAILS WITH BIOTIN PO) Take 1 Cap by mouth daily. (Patient not taking: Reported on 6/24/2021)      CHOLECALCIFEROL, VITAMIN D3, (VITAMIN D3 PO) Take 2,000 Units by mouth daily. (Patient not taking: Reported on 4/11/2022)      Glucosamine &Chondroit-MV-Min3 187-023-21-0.5 mg tab Take 1 Tab by mouth daily.  (Patient not taking: Reported on 4/11/2022)         Patient Active Problem List    Diagnosis Date Noted    Carpal tunnel syndrome 09/19/2019    Neck pain 09/19/2019    Myofascial pain 11/15/2017    Vitamin D deficiency 01/29/2016    Vitamin B12 deficiency 01/29/2016    Insomnia 01/29/2016    Hyperlipidemia 06/04/2015    Hypothyroidism 06/04/2015    Cervical radicular pain 12/21/2011    Cervicalgia 12/21/2011     Results for orders placed or performed in visit on 03/26/21   HEMOGLOBIN A1C W/O EAG   Result Value Ref Range    Hemoglobin A1c 5.5 4.8 - 5.6 %    AVG  91 - 123 mg/dL VITAMIN D, 25 HYDROXY   Result Value Ref Range    VITAMIN D, 25-HYDROXY 77.5 32.0 - 100.0 ng/mL   LIPID PANEL   Result Value Ref Range    Triglyceride 67 40 - 149 mg/dL    HDL Cholesterol 57 >=40 mg/dL    Cholesterol, total 153 110 - 200 mg/dL    CHOLESTEROL/HDL 2.7 0.0 - 5.0    Non-HDL Cholesterol 96 <130 mg/dL    LDL, calculated 82 50 - 99 mg/dL    VLDL, calculated 13 8 - 30 mg/dL    LDL/HDL Ratio 1.4    METABOLIC PANEL, COMPREHENSIVE   Result Value Ref Range    Glucose 101 (H) 70 - 99 mg/dL    BUN 19 6 - 22 mg/dL    Creatinine 0.6 (L) 0.8 - 1.4 mg/dL    Sodium 141 133 - 145 mmol/L    Potassium 4.4 3.5 - 5.5 mmol/L    Chloride 104 98 - 110 mmol/L    CO2 26 20 - 32 mmol/L    AST (SGOT) 17 10 - 37 U/L    ALT (SGPT) 12 5 - 40 U/L    Alk. phosphatase 64 40 - 120 U/L    Bilirubin, total 0.2 0.2 - 1.2 mg/dL    Calcium 9.9 8.4 - 10.5 mg/dL    Protein, total 6.6 6.2 - 8.1 g/dL    Albumin 4.4 3.5 - 5.0 g/dL    A-G Ratio 2.0 1.1 - 2.6 ratio    Globulin 2.2 2.0 - 4.0 g/dL    Anion gap 11.0 3.0 - 15.0 mmol/L    GFRAA >60.0 >60.0    GFRNA >60.0 >60.0   CBC WITH AUTOMATED DIFF   Result Value Ref Range    WBC 5.3 4.0 - 11.0 K/uL    RBC 4.03 3.80 - 5.20 M/uL    HGB 12.9 11.7 - 16.0 g/dL    HCT 40.5 35.1 - 48.0 %     (H) 81 - 99 fL    MCH 32 26 - 34 pg    MCHC 32 31 - 36 g/dL    RDW 12.2 10.0 - 15.5 %    PLATELET 773 836 - 555 K/uL    MPV 12.0 9.0 - 13.0 fL    NEUTROPHILS 58 40 - 75 %    Lymphocytes 32 20 - 45 %    MONOCYTES 7 3 - 12 %    EOSINOPHILS 1 0 - 6 %    BASOPHILS 1 0 - 2 %    ABS. NEUTROPHILS 3.1 1.8 - 7.7 K/uL    ABSOLUTE LYMPHOCYTE COUNT 1.7 1.0 - 4.8 K/uL    ABS. MONOCYTES 0.4 0.1 - 1.0 K/uL    ABS. EOSINOPHILS 0.1 0.0 - 0.5 K/uL    ABS. BASOPHILS 0.0 0.0 - 0.2 K/uL     No visits with results within 3 Month(s) from this visit.    Latest known visit with results is:   Appointment on 03/26/2021   Component Date Value Ref Range Status    Hemoglobin A1c 03/26/2021 5.5  4.8 - 5.6 % Final    AVG GLU 03/26/2021 111 91 - 123 mg/dL Final    Comment: 5.7 - 6.4% is indicative of prediabetes. > 6.4% is indicative of diabetes.  VITAMIN D, 25-HYDROXY 03/26/2021 77.5  32.0 - 100.0 ng/mL Final    Triglyceride 03/26/2021 67  40 - 149 mg/dL Final    HDL Cholesterol 03/26/2021 57  >=40 mg/dL Final    Cholesterol, total 03/26/2021 153  110 - 200 mg/dL Final    CHOLESTEROL/HDL 03/26/2021 2.7  0.0 - 5.0 Final    Non-HDL Cholesterol 03/26/2021 96  <130 mg/dL Final    LDL, calculated 03/26/2021 82  50 - 99 mg/dL Final    VLDL, calculated 03/26/2021 13  8 - 30 mg/dL Final    LDL/HDL Ratio 03/26/2021 1.4   Final    Comment: Test includes cholesterol, HDL cholesterol, triglycerides and LDL. Cholesterol Recommended NCEP guidelines in mg/dL:  Less than 200            Desirable  200 - 239                Borderline High  Greater than or  = 240   High  Please Note:  Total Chol/HDL Ratio                   Men     Women  1/2 Avg. Risk    3.4     3.3      Avg. Risk    5.0     4.4  2X  Avg. Risk    9.6     7.1  3X  Avg. Risk   23.4    11.0      Glucose 03/26/2021 101* 70 - 99 mg/dL Final    BUN 03/26/2021 19  6 - 22 mg/dL Final    Creatinine 03/26/2021 0.6* 0.8 - 1.4 mg/dL Final    Sodium 03/26/2021 141  133 - 145 mmol/L Final    Potassium 03/26/2021 4.4  3.5 - 5.5 mmol/L Final    Chloride 03/26/2021 104  98 - 110 mmol/L Final    CO2 03/26/2021 26  20 - 32 mmol/L Final    AST (SGOT) 03/26/2021 17  10 - 37 U/L Final    ALT (SGPT) 03/26/2021 12  5 - 40 U/L Final    Alk.  phosphatase 03/26/2021 64  40 - 120 U/L Final    Bilirubin, total 03/26/2021 0.2  0.2 - 1.2 mg/dL Final    Calcium 03/26/2021 9.9  8.4 - 10.5 mg/dL Final    Protein, total 03/26/2021 6.6  6.2 - 8.1 g/dL Final    Albumin 03/26/2021 4.4  3.5 - 5.0 g/dL Final    A-G Ratio 03/26/2021 2.0  1.1 - 2.6 ratio Final    Globulin 03/26/2021 2.2  2.0 - 4.0 g/dL Final    Anion gap 03/26/2021 11.0  3.0 - 15.0 mmol/L Final    Comment: Anion Gap calculation based on electrolyte reference ranges. Test includes Albumin, Alkaline Phosphatase, ALT, AST, BUN, Calcium, CO2,  Chloride, Creatinine, Glucose, Potassium, Sodium, Total Bilirubin and Total  Protein. Estimated GFR results are reported in mL/min/1.73 sq.m. by the MDRD equation. This eGFR is validated for stable chronic renal failure patients. This   equation  is unreliable in acute illness or patients with normal renal function.  GFRAA 03/26/2021 >60.0  >60.0 Final    GFRNA 03/26/2021 >60.0  >60.0 Final    WBC 03/26/2021 5.3  4.0 - 11.0 K/uL Final    RBC 03/26/2021 4.03  3.80 - 5.20 M/uL Final    HGB 03/26/2021 12.9  11.7 - 16.0 g/dL Final    HCT 03/26/2021 40.5  35.1 - 48.0 % Final    MCV 03/26/2021 101* 81 - 99 fL Final    MCH 03/26/2021 32  26 - 34 pg Final    MCHC 03/26/2021 32  31 - 36 g/dL Final    RDW 03/26/2021 12.2  10.0 - 15.5 % Final    PLATELET 21/57/1246 469  140 - 440 K/uL Final    MPV 03/26/2021 12.0  9.0 - 13.0 fL Final    NEUTROPHILS 03/26/2021 58  40 - 75 % Final    Lymphocytes 03/26/2021 32  20 - 45 % Final    MONOCYTES 03/26/2021 7  3 - 12 % Final    EOSINOPHILS 03/26/2021 1  0 - 6 % Final    BASOPHILS 03/26/2021 1  0 - 2 % Final    ABS. NEUTROPHILS 03/26/2021 3.1  1.8 - 7.7 K/uL Final    ABSOLUTE LYMPHOCYTE COUNT 03/26/2021 1.7  1.0 - 4.8 K/uL Final    ABS. MONOCYTES 03/26/2021 0.4  0.1 - 1.0 K/uL Final    ABS. EOSINOPHILS 03/26/2021 0.1  0.0 - 0.5 K/uL Final    ABS. BASOPHILS 03/26/2021 0.0  0.0 - 0.2 K/uL Final          Follow-up and Dispositions    · Return in about 6 months (around 10/11/2022).

## 2022-04-11 NOTE — PROGRESS NOTES
Ligia Jurado is a 60 y.o. female (: 1960) presenting to address:    Chief Complaint   Patient presents with    Physical       Vitals:    22 1137   BP: 115/70   Pulse: 82   Resp: 16   Temp: 98 °F (36.7 °C)   TempSrc: Temporal   SpO2: 94%   Weight: 181 lb 3.2 oz (82.2 kg)   Height: 5' 2\" (1.575 m)   PainSc:   4   PainLoc: Shoulder       Hearing/Vision:      Hearing Screening    125Hz 250Hz 500Hz 1000Hz 2000Hz 3000Hz 4000Hz 6000Hz 8000Hz   Right ear:            Left ear:               Visual Acuity Screening    Right eye Left eye Both eyes   Without correction:      With correction: 20/20 20/100 20/20       Learning Assessment:     Learning Assessment 2019   PRIMARY LEARNER Patient   PRIMARY LANGUAGE ENGLISH   LEARNER PREFERENCE PRIMARY PICTURES     LISTENING     READING     DEMONSTRATION     VIDEOS   ANSWERED BY patient   RELATIONSHIP SELF     Depression Screening:     3 most recent PHQ Screens 2022   Little interest or pleasure in doing things Not at all   Feeling down, depressed, irritable, or hopeless Not at all   Total Score PHQ 2 0     Fall Risk Assessment:     Fall Risk Assessment, last 12 mths 2022   Able to walk? Yes   Fall in past 12 months? 0   Do you feel unsteady? 0   Are you worried about falling 0   Number of falls in past 12 months -   Fall with injury? -     Abuse Screening:     Abuse Screening Questionnaire 2022   Do you ever feel afraid of your partner? N   Are you in a relationship with someone who physically or mentally threatens you? N   Is it safe for you to go home?  Y     ADL Assessment:     ADL Assessment 2019   Feeding yourself No Help Needed   Getting from bed to chair No Help Needed   Getting dressed No Help Needed   Bathing or showering No Help Needed   Walk across the room (includes cane/walker) No Help Needed   Using the telphone No Help Needed   Taking your medications No Help Needed   Preparing meals No Help Needed   Managing money (expenses/bills) No Help Needed   Moderately strenuous housework (laundry) No Help Needed   Shopping for personal items (toiletries/medicines) No Help Needed   Shopping for groceries No Help Needed   Driving No Help Needed   Climbing a flight of stairs No Help Needed   Getting to places beyond walking distances No Help Needed        Coordination of Care Questionaire:   1. \"Have you been to the ER, urgent care clinic since your last visit? Hospitalized since your last visit? \" No    2. \"Have you seen or consulted any other health care providers outside of the [address] [place] [address] [facility] since your last visit? \" Yes Where: Ortho     3. For patients aged 39-70: Has the patient had a colonoscopy? Yes - no Care Gap present     If the patient is female:    4. For patients aged 41-77: Has the patient had a mammogram within the past 2 years? Yes - no Care Gap present    5. For patients aged 21-65: Has the patient had a pap smear? Yes - no Care Gap present    Advanced Directive:   1. Do you have an Advanced Directive? NO    2. Would you like information on Advanced Directives?  NO

## 2022-04-12 ENCOUNTER — TELEPHONE (OUTPATIENT)
Dept: FAMILY MEDICINE CLINIC | Age: 62
End: 2022-04-12

## 2022-04-12 NOTE — TELEPHONE ENCOUNTER
----- Message from Viktoria Faye sent at 4/11/2022  4:28 PM EDT -----  Subject: Message to Provider    QUESTIONS  Information for Provider? pt is wanting to reschedule shingles shot and   lab appt for Thursday AM instead, Had to cancel because she is still on   prednisone, please call to schedule  ---------------------------------------------------------------------------  --------------  [address]  What is the best way for the office to contact you? OK to leave message on   voicemail  Preferred Call Back Phone Number?  [phone]  ---------------------------------------------------------------------------  --------------  SCRIPT ANSWERS  undefined

## 2022-04-12 NOTE — TELEPHONE ENCOUNTER
Informed pt needs to wait 2 weeks before getting Shingles vaccine since pt is on steroid tx; pt scheduled lab appt: Future Appointments   Date Time Provider Jermaine Rousei   4/18/2022  3:10 PM [facility] AMB     Pt will call back to schedule NV for shingles vaccine.

## 2022-04-13 LAB
HPV HIGH RISK, 507303: NEGATIVE
PAP IMAGE GUIDED, 8900296: NORMAL

## 2022-04-18 ENCOUNTER — APPOINTMENT (OUTPATIENT)
Dept: FAMILY MEDICINE CLINIC | Age: 62
End: 2022-04-18

## 2022-04-18 DIAGNOSIS — E55.9 VITAMIN D DEFICIENCY: ICD-10-CM

## 2022-04-18 DIAGNOSIS — E03.9 HYPOTHYROIDISM (ACQUIRED): ICD-10-CM

## 2022-04-18 DIAGNOSIS — Z13.1 SCREENING FOR DIABETES MELLITUS (DM): ICD-10-CM

## 2022-04-18 DIAGNOSIS — E78.5 HYPERLIPIDEMIA, UNSPECIFIED HYPERLIPIDEMIA TYPE: ICD-10-CM

## 2022-04-18 DIAGNOSIS — Z00.00 ANNUAL PHYSICAL EXAM: ICD-10-CM

## 2022-04-18 RX ORDER — SIMVASTATIN 10 MG/1
10 TABLET, FILM COATED ORAL
Qty: 90 TABLET | Refills: 0 | Status: SHIPPED | OUTPATIENT
Start: 2022-04-18 | End: 2022-05-13

## 2022-04-18 RX ORDER — LEVOTHYROXINE SODIUM 75 UG/1
TABLET ORAL
Qty: 90 TABLET | Refills: 0 | Status: SHIPPED | OUTPATIENT
Start: 2022-04-18 | End: 2022-07-28 | Stop reason: SDUPTHER

## 2022-04-18 NOTE — TELEPHONE ENCOUNTER
Requested Prescriptions     Pending Prescriptions Disp Refills    levothyroxine (SYNTHROID) 75 mcg tablet 90 Tablet 0     Sig: take 1 tablet by mouth once daily BEFORE BREAKFAST    simvastatin (ZOCOR) 10 mg tablet 90 Tablet 0     Sig: Take 1 Tablet by mouth nightly. Pt came in for labs today and had 2 follow up requests. Se is asking for a 90 day supply of her medication. She also states that she mentioned a bladder infection with Dr Jenna Phillips and she wanted to know if she should see a specialist. Please advise.

## 2022-04-19 LAB
25(OH)D3 SERPL-MCNC: 43.4 NG/ML (ref 32–100)
A-G RATIO,AGRAT: 1.8 RATIO (ref 1.1–2.6)
ABSOLUTE LYMPHOCYTE COUNT, 10803: 2.4 K/UL (ref 1–4.8)
ALBUMIN SERPL-MCNC: 4.4 G/DL (ref 3.5–5)
ALP SERPL-CCNC: 92 U/L (ref 40–120)
ALT SERPL-CCNC: 39 U/L (ref 5–40)
ANION GAP SERPL CALC-SCNC: 12 MMOL/L (ref 3–15)
AST SERPL W P-5'-P-CCNC: 23 U/L (ref 10–37)
AVG GLU, 10930: 119 MG/DL (ref 91–123)
BASOPHILS # BLD: 0 K/UL (ref 0–0.2)
BASOPHILS NFR BLD: 0 % (ref 0–2)
BILIRUB SERPL-MCNC: 0.3 MG/DL (ref 0.2–1.2)
BUN SERPL-MCNC: 20 MG/DL (ref 6–22)
CALCIUM SERPL-MCNC: 9.9 MG/DL (ref 8.4–10.5)
CHLORIDE SERPL-SCNC: 102 MMOL/L (ref 98–110)
CHOLEST SERPL-MCNC: 249 MG/DL (ref 110–200)
CO2 SERPL-SCNC: 26 MMOL/L (ref 20–32)
CREAT SERPL-MCNC: 0.6 MG/DL (ref 0.8–1.4)
EOSINOPHIL # BLD: 0.1 K/UL (ref 0–0.5)
EOSINOPHIL NFR BLD: 1 % (ref 0–6)
ERYTHROCYTE [DISTWIDTH] IN BLOOD BY AUTOMATED COUNT: 13.2 % (ref 10–15.5)
GFRAA, 66117: >60
GFRNA, 66118: >60
GLOBULIN,GLOB: 2.4 G/DL (ref 2–4)
GLUCOSE SERPL-MCNC: 84 MG/DL (ref 70–99)
GRANULOCYTES,GRANS: 61 % (ref 40–75)
HBA1C MFR BLD HPLC: 5.8 % (ref 4.8–5.6)
HCT VFR BLD AUTO: 44.5 % (ref 35.1–48)
HDLC SERPL-MCNC: 4 MG/DL (ref 0–5)
HDLC SERPL-MCNC: 62 MG/DL
HGB BLD-MCNC: 13.7 G/DL (ref 11.7–16)
LDL/HDL RATIO,LDHD: 2.5
LDLC SERPL CALC-MCNC: 154 MG/DL (ref 50–99)
LYMPHOCYTES, LYMLT: 30 % (ref 20–45)
MCH RBC QN AUTO: 33 PG (ref 26–34)
MCHC RBC AUTO-ENTMCNC: 31 G/DL (ref 31–36)
MCV RBC AUTO: 107 FL (ref 80–99)
MONOCYTES # BLD: 0.5 K/UL (ref 0.1–1)
MONOCYTES NFR BLD: 7 % (ref 3–12)
NEUTROPHILS # BLD AUTO: 4.9 K/UL (ref 1.8–7.7)
NON-HDL CHOLESTEROL, 011976: 187 MG/DL
PLATELET # BLD AUTO: 283 K/UL (ref 140–440)
PMV BLD AUTO: 11.1 FL (ref 9–13)
POTASSIUM SERPL-SCNC: 4.7 MMOL/L (ref 3.5–5.5)
PROT SERPL-MCNC: 6.8 G/DL (ref 6.2–8.1)
RBC # BLD AUTO: 4.16 M/UL (ref 3.8–5.2)
SODIUM SERPL-SCNC: 140 MMOL/L (ref 133–145)
T4 FREE SERPL-MCNC: 1.2 NG/DL (ref 0.9–1.8)
TRIGL SERPL-MCNC: 168 MG/DL (ref 40–149)
TSH SERPL DL<=0.005 MIU/L-ACNC: 2.57 MCU/ML (ref 0.27–4.2)
VLDLC SERPL CALC-MCNC: 34 MG/DL (ref 8–30)
WBC # BLD AUTO: 7.9 K/UL (ref 4–11)

## 2022-04-25 ENCOUNTER — VIRTUAL VISIT (OUTPATIENT)
Dept: FAMILY MEDICINE CLINIC | Age: 62
End: 2022-04-25
Payer: COMMERCIAL

## 2022-04-25 DIAGNOSIS — J01.00 ACUTE NON-RECURRENT MAXILLARY SINUSITIS: Primary | ICD-10-CM

## 2022-04-25 PROCEDURE — 99213 OFFICE O/P EST LOW 20 MIN: CPT | Performed by: INTERNAL MEDICINE

## 2022-04-25 RX ORDER — AMOXICILLIN AND CLAVULANATE POTASSIUM 875; 125 MG/1; MG/1
1 TABLET, FILM COATED ORAL 2 TIMES DAILY
Qty: 20 TABLET | Refills: 0 | Status: SHIPPED | OUTPATIENT
Start: 2022-04-25 | End: 2022-05-05

## 2022-04-25 RX ORDER — FLUTICASONE PROPIONATE 50 MCG
2 SPRAY, SUSPENSION (ML) NASAL DAILY
Qty: 1 EACH | Refills: 0 | Status: SHIPPED | OUTPATIENT
Start: 2022-04-25 | End: 2022-05-11

## 2022-04-25 NOTE — PROGRESS NOTES
Don Noel is a 60 y.o. female who was seen by synchronous (real-time) audio-video technology on 4/25/2022 for Nasal Congestion        Assessment & Plan:   Diagnoses and all orders for this visit:    1. Acute non-recurrent maxillary sinusitis  -     amoxicillin-clavulanate (AUGMENTIN) 875-125 mg per tablet; Take 1 Tablet by mouth two (2) times a day for 10 days. -     fluticasone propionate (FLONASE) 50 mcg/actuation nasal spray; 2 Sprays by Both Nostrils route daily. - advised pt to go to  if not better. 712  Subjective:   C/o facial pain/pressure/congestion/postnasal drip and dry cough at night, started 2 weeks ago, not improving, she had a negative home covid test last week  No fever or chills, no wheezing or sob      Prior to Admission medications    Medication Sig Start Date End Date Taking? Authorizing Provider   amoxicillin-clavulanate (AUGMENTIN) 875-125 mg per tablet Take 1 Tablet by mouth two (2) times a day for 10 days. 4/25/22 5/5/22 Yes Silverio Soliatrio MD   fluticasone propionate (FLONASE) 50 mcg/actuation nasal spray 2 Sprays by Both Nostrils route daily. 4/25/22  Yes Rosamaria Ritter MD   levothyroxine (SYNTHROID) 75 mcg tablet take 1 tablet by mouth once daily BEFORE BREAKFAST 4/18/22  Yes Mary Ann Jett, DO   simvastatin (ZOCOR) 10 mg tablet Take 1 Tablet by mouth nightly. 4/18/22  Yes Mary Ann Jett, DO   glucosamine/chondr galloway A sod (OSTEO BI-FLEX PO) Take  by mouth daily. Yes Provider, Historical   multivitamin with minerals (HAIR,SKIN AND NAILS PO) Take  by mouth daily. Yes Provider, Historical   PNV GD.96/SKSHERR fum/folic ac (PRENATAL PO) Take  by mouth daily. Nature made multivitamin   Yes Provider, Historical   antiox. mv no.12/omeg3s/lut/miguel angel (MACULAR BENEFITS PO) Take  by mouth. Yes Provider, Historical   multivit-min/FA/lutein/zeaxant (MACULAR VITAMIN PO) Take  by mouth.    Yes Provider, Historical   omega-3 fatty acids (FISH OIL) cap Take 3,000 mg by mouth daily. Yes Provider, Historical   predniSONE (DELTASONE) 10 mg tablet TAKE 4 TABLETS BY MOUTH ONCE DAILY FOR 3 DAYS, THEN TAKE 3 TABLET. ..  (REFER TO PRESCRIPTION NOTES). Patient not taking: Reported on 4/25/2022 4/1/22   Provider, Historical   CHOLECALCIFEROL, VITAMIN D3, (VITAMIN D3 PO) Take 2,000 Units by mouth daily. Patient not taking: Reported on 4/11/2022    Provider, Historical   Glucosamine &Chondroit-MV-Min3 149-784-44-0.5 mg tab Take 1 Tab by mouth daily. Patient not taking: Reported on 4/11/2022    Provider, Historical     Patient Active Problem List    Diagnosis Date Noted    Carpal tunnel syndrome 09/19/2019    Neck pain 09/19/2019    Myofascial pain 11/15/2017    Vitamin D deficiency 01/29/2016    Vitamin B12 deficiency 01/29/2016    Insomnia 01/29/2016    Hyperlipidemia 06/04/2015    Hypothyroidism 06/04/2015    Cervical radicular pain 12/21/2011    Cervicalgia 12/21/2011     Past Medical History:   Diagnosis Date    Macular degeneration        Review of Systems   Constitutional: Negative for chills, fever and malaise/fatigue. HENT: Negative for ear pain and sore throat. Respiratory: Negative for shortness of breath and wheezing. Objective:   No flowsheet data found. General: alert, cooperative, no distress   Mental  status: normal mood, behavior, speech, dress, motor activity, and thought processes, able to follow commands   HENT: NCAT   Neck: no visualized mass   Resp: no respiratory distress   Neuro: no gross deficits   Skin: no discoloration or lesions of concern on visible areas   Psychiatric: normal affect, consistent with stated mood, no evidence of hallucinations     Additional exam findings: We discussed the expected course, resolution and complications of the diagnosis(es) in detail. Medication risks, benefits, costs, interactions, and alternatives were discussed as indicated.   I advised her to contact the office if her condition worsens, changes or fails to improve as anticipated. She expressed understanding with the diagnosis(es) and plan. Maryan Hurtado, was evaluated through a synchronous (real-time) audio-video encounter. The patient (or guardian if applicable) is aware that this is a billable service, which includes applicable co-pays. Verbal consent to proceed has been obtained. The visit was conducted pursuant to the emergency declaration under the [address] [place] [address] authority and the [employer] Act. Patient identification was verified, and a caregiver was present when appropriate. The patient was located at home in a state where the provider was licensed to provide care.     Carlos Kaur MD

## 2022-04-25 NOTE — PROGRESS NOTES
CBC is normal  Vitamin D is 43 continue vitamin D supplement    Elevated cholesterol and elevated LDL, she is on 10 mg of simvastatin if she has been adherent to the medication daily the dose need to be increased from 10 to 20 mg and she can take 2 tablets until she get the new prescription, and if she has not been taking it regularly she need to take it every night we will need to recheck in 6 months  Other wise kidney function liver function is normal no other abnormality

## 2022-05-11 DIAGNOSIS — J01.00 ACUTE NON-RECURRENT MAXILLARY SINUSITIS: ICD-10-CM

## 2022-05-11 RX ORDER — FLUTICASONE PROPIONATE 50 MCG
SPRAY, SUSPENSION (ML) NASAL
Qty: 16 G | Refills: 1 | Status: SHIPPED | OUTPATIENT
Start: 2022-05-11 | End: 2022-07-07

## 2022-05-13 ENCOUNTER — OFFICE VISIT (OUTPATIENT)
Dept: FAMILY MEDICINE CLINIC | Age: 62
End: 2022-05-13
Payer: COMMERCIAL

## 2022-05-13 VITALS
OXYGEN SATURATION: 98 % | HEART RATE: 77 BPM | SYSTOLIC BLOOD PRESSURE: 118 MMHG | BODY MASS INDEX: 34.41 KG/M2 | WEIGHT: 187 LBS | TEMPERATURE: 97.3 F | HEIGHT: 62 IN | DIASTOLIC BLOOD PRESSURE: 82 MMHG

## 2022-05-13 DIAGNOSIS — E55.9 VITAMIN D DEFICIENCY: ICD-10-CM

## 2022-05-13 DIAGNOSIS — M76.899 QUADRICEPS TENDONITIS: ICD-10-CM

## 2022-05-13 DIAGNOSIS — R73.03 PREDIABETES: ICD-10-CM

## 2022-05-13 DIAGNOSIS — E78.5 HYPERLIPIDEMIA, UNSPECIFIED HYPERLIPIDEMIA TYPE: ICD-10-CM

## 2022-05-13 DIAGNOSIS — N39.41 URGE INCONTINENCE OF URINE: ICD-10-CM

## 2022-05-13 DIAGNOSIS — M79.651 RIGHT THIGH PAIN: ICD-10-CM

## 2022-05-13 DIAGNOSIS — E66.9 OBESITY (BMI 30.0-34.9): ICD-10-CM

## 2022-05-13 DIAGNOSIS — R10.2 PELVIC PAIN: ICD-10-CM

## 2022-05-13 DIAGNOSIS — Z23 ENCOUNTER FOR IMMUNIZATION: Primary | ICD-10-CM

## 2022-05-13 DIAGNOSIS — E03.9 HYPOTHYROIDISM (ACQUIRED): ICD-10-CM

## 2022-05-13 PROCEDURE — 90750 HZV VACC RECOMBINANT IM: CPT | Performed by: LEGAL MEDICINE

## 2022-05-13 PROCEDURE — 99214 OFFICE O/P EST MOD 30 MIN: CPT | Performed by: LEGAL MEDICINE

## 2022-05-13 RX ORDER — ROSUVASTATIN CALCIUM 20 MG/1
20 TABLET, COATED ORAL
Qty: 90 TABLET | Refills: 3 | Status: SHIPPED | OUTPATIENT
Start: 2022-05-13 | End: 2022-09-06

## 2022-05-13 NOTE — PROGRESS NOTES
Immunization of 2nd dose Shingrix vaccine was administered 5/13/2022 by Janett Silva LPN. Patient tolerated procedure well. No reactions noted.

## 2022-05-13 NOTE — PROGRESS NOTES
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 No pertinent surgical history. Social History     Tobacco Use    Smoking status: Former Smoker     Years: 10.00     Types: Cigarettes     Quit date: 1995     Years since quittin.4    Smokeless tobacco: Never Used   Substance Use Topics    Alcohol use: Yes     Comment: occassionally       Family History   Problem Relation Age of Onset    No Known Problems Mother     No Known Problems Father        Review of Systems   Constitutional: Negative for chills, fever, malaise/fatigue and weight loss. HENT: Negative for congestion, ear discharge, ear pain, hearing loss and nosebleeds. Eyes: Negative for blurred vision, double vision and discharge. Respiratory: Negative for cough. Cardiovascular: Negative for chest pain, palpitations, claudication and leg swelling. Gastrointestinal: Negative for abdominal pain, blood in stool, constipation, diarrhea, melena, nausea and vomiting. Genitourinary: Positive for urgency. Negative for dysuria, flank pain, frequency and hematuria. Musculoskeletal: Positive for back pain, joint pain, myalgias and neck pain. Negative for falls. Skin: Negative for itching and rash. Neurological: Negative for dizziness, tingling, sensory change, speech change, focal weakness, seizures, loss of consciousness, weakness and headaches. Physical Exam  Vitals and nursing note reviewed. Constitutional:       General: She is not in acute distress. Appearance: She is well-developed. She is not diaphoretic. HENT:      Head: Normocephalic and atraumatic. Eyes:      General: No scleral icterus. Neck:      Thyroid: No thyromegaly. Cardiovascular:      Rate and Rhythm: Normal rate and regular rhythm. Heart sounds: Normal heart sounds. Pulmonary:      Effort: Pulmonary effort is normal. No respiratory distress. Breath sounds: Normal breath sounds. No wheezing or rales. Chest:      Chest wall: No tenderness. Abdominal:      General: There is no distension. Palpations: Abdomen is soft. There is no mass. Tenderness: There is no abdominal tenderness. There is no right CVA tenderness, left CVA tenderness, guarding or rebound. Hernia: No hernia is present. Musculoskeletal:         General: No swelling, tenderness, deformity or signs of injury. Normal range of motion. Right lower leg: No edema. Left lower leg: No edema. Lymphadenopathy:      Cervical: No cervical adenopathy. Skin:     General: Skin is warm and dry. Coloration: Skin is not pale. Findings: No erythema or rash. Neurological:      Mental Status: She is alert and oriented to person, place, and time. Cranial Nerves: No cranial nerve deficit. Motor: No weakness. Coordination: Coordination normal.      Gait: Gait normal.   Psychiatric:         Behavior: Behavior normal.         Thought Content: Thought content normal.         Judgment: Judgment normal.      Comments: Sad  Mood           Assessment and plan     Plan of care has been discussed with the patient, he agrees to the plan and verbalized understanding.   All his questions were answered  More than 50% of the time spent in this visit was counseling the patient about  illness and treatment options         1. Encounter for immunization  Vaccine was given with no complications  - ZOSTER VACC RECOMBINANT ADJUVANTED    2. Hypothyroidism (acquired)  She adherent to medication     3. Hyperlipidemia, unspecified hyperlipidemia type  She is adherent to statins   - rosuvastatin (CRESTOR) 20 mg tablet; Take 1 Tablet by mouth nightly for 90 days. Dispense: 90 Tablet; Refill: 3    4. Prediabetes  She is  lifestyle modifications     5. Vitamin D deficiency  She is on OTC medication     6. Obesity (BMI 30.0-34. 9)  Lifestyle modification has been discussed with patient in details, decrease carbohydrates, decrease or eliminate sugar intake, gradually increase physical activity as tolerated to be about 4 to 5 hours a week. 7. Right thigh pain    - REFERRAL TO PHYSICAL THERAPY    8. Quadriceps tendonitis    - REFERRAL TO PHYSICAL THERAPY    9. Urge incontinence of urine    - REFERRAL TO UROLOGY  - URINALYSIS W/ RFLX MICROSCOPIC; Future  - US PELV NON OB W TV; Future    10. Pelvic pain    - REFERRAL TO UROLOGY  - URINALYSIS W/ RFLX MICROSCOPIC; Future  - US PELV NON OB W TV; Future    Mild depression   I advised her with psychotherapy but she declined     Current Outpatient Medications   Medication Sig Dispense Refill    rosuvastatin (CRESTOR) 20 mg tablet Take 1 Tablet by mouth nightly for 90 days. 90 Tablet 3    fluticasone propionate (FLONASE) 50 mcg/actuation nasal spray instill 2 sprays into each nostril once daily 16 g 1    levothyroxine (SYNTHROID) 75 mcg tablet take 1 tablet by mouth once daily BEFORE BREAKFAST 90 Tablet 0    glucosamine/chondr galloway A sod (OSTEO BI-FLEX PO) Take  by mouth daily.  multivitamin with minerals (HAIR,SKIN AND NAILS PO) Take  by mouth daily.  PNV LE.78/LADQWCY fum/folic ac (PRENATAL PO) Take  by mouth daily.  Nature made multivitamin      multivit-min/FA/lutein/zeaxant (MACULAR VITAMIN PO) Take  by mouth.  CHOLECALCIFEROL, VITAMIN D3, (VITAMIN D3 PO) Take 2,000 Units by mouth daily.  omega-3 fatty acids (FISH OIL) cap Take 3,000 mg by mouth daily.  fluticasone (FLONASE) 50 mcg/actuation nasal spray 2 Sprays by Both Nostrils route daily for 5 days. 1 Bottle 0       Patient Active Problem List    Diagnosis Date Noted    Carpal tunnel syndrome 09/19/2019    Neck pain 09/19/2019    Myofascial pain 11/15/2017    Vitamin D deficiency 01/29/2016    Vitamin B12 deficiency 01/29/2016    Insomnia 01/29/2016    Hyperlipidemia 06/04/2015    Hypothyroidism 06/04/2015    Cervical radicular pain 12/21/2011    Cervicalgia 12/21/2011     Results for orders placed or performed in visit on 04/18/22   T4, FREE   Result Value Ref Range    T4, Free 1.2 0.9 - 1.8 ng/dL   TSH 3RD GENERATION   Result Value Ref Range    TSH 2.57 0.27 - 4.20 mcU/mL     Orders Only on 04/18/2022   Component Date Value Ref Range Status    T4, Free 04/18/2022 1.2  0.9 - 1.8 ng/dL Final    TSH 04/18/2022 2.57  0.27 - 4.20 mcU/mL Final   Appointment on 04/18/2022   Component Date Value Ref Range Status    WBC 04/18/2022 7.9  4.0 - 11.0 K/uL Final    RBC 04/18/2022 4.16  3.80 - 5.20 M/uL Final    HGB 04/18/2022 13.7  11.7 - 16.0 g/dL Final    HCT 04/18/2022 44.5  35.1 - 48.0 % Final    MCV 04/18/2022 107* 80 - 99 fL Final    MCH 04/18/2022 33  26 - 34 pg Final    MCHC 04/18/2022 31  31 - 36 g/dL Final    RDW 04/18/2022 13.2  10.0 - 15.5 % Final    PLATELET 44/79/0661 769  140 - 440 K/uL Final    MPV 04/18/2022 11.1  9.0 - 13.0 fL Final    NEUTROPHILS 04/18/2022 61  40 - 75 % Final    Lymphocytes 04/18/2022 30  20 - 45 % Final    MONOCYTES 04/18/2022 7  3 - 12 % Final    EOSINOPHILS 04/18/2022 1  0 - 6 % Final    BASOPHILS 04/18/2022 0  0 - 2 % Final    ABS.  NEUTROPHILS 04/18/2022 4.9  1.8 - 7.7 K/uL Final    ABSOLUTE LYMPHOCYTE COUNT 04/18/2022 2.4  1.0 - 4.8 K/uL Final    ABS. MONOCYTES 04/18/2022 0.5  0.1 - 1.0 K/uL Final    ABS. EOSINOPHILS 04/18/2022 0.1  0.0 - 0.5 K/uL Final    ABS. BASOPHILS 04/18/2022 0.0  0.0 - 0.2 K/uL Final    VITAMIN D, 25-HYDROXY 04/18/2022 43.4  32.0 - 100.0 ng/mL Final    Hemoglobin A1c 04/18/2022 5.8* 4.8 - 5.6 % Final    AVG GLU 04/18/2022 119  91 - 123 mg/dL Final    Comment: 5.7 - 6.4% is indicative of prediabetes. > 6.4% is indicative of diabetes.  Triglyceride 04/18/2022 168* 40 - 149 mg/dL Final    HDL Cholesterol 04/18/2022 62  >=40 mg/dL Final    Cholesterol, total 04/18/2022 249* 110 - 200 mg/dL Final    CHOLESTEROL/HDL 04/18/2022 4.0  0.0 - 5.0 Final    Non-HDL Cholesterol 04/18/2022 187* <130 mg/dL Final    LDL, calculated 04/18/2022 154* 50 - 99 mg/dL Final    VLDL, calculated 04/18/2022 34* 8 - 30 mg/dL Final    LDL/HDL Ratio 04/18/2022 2.5   Final    Comment: Test includes cholesterol, HDL cholesterol, triglycerides and LDL. Cholesterol Recommended NCEP guidelines in mg/dL:    Less than 200            Desirable  200 - 239                Borderline High  Greater than or  = 240   High      Please Note:  Total Chol/HDL Ratio                     Men     Women  1/2 Avg. Risk    3.4     3.3      Avg. Risk    5.0     4.4  2X  Avg. Risk    9.6     7.1  3X  Avg. Risk   23.4    11.0            Glucose 04/18/2022 84  70 - 99 mg/dL Final    BUN 04/18/2022 20  6 - 22 mg/dL Final    Creatinine 04/18/2022 0.6* 0.8 - 1.4 mg/dL Final    Sodium 04/18/2022 140  133 - 145 mmol/L Final    Potassium 04/18/2022 4.7  3.5 - 5.5 mmol/L Final    Chloride 04/18/2022 102  98 - 110 mmol/L Final    CO2 04/18/2022 26  20 - 32 mmol/L Final    AST (SGOT) 04/18/2022 23  10 - 37 U/L Final    ALT (SGPT) 04/18/2022 39  5 - 40 U/L Final    Alk.  phosphatase 04/18/2022 92  40 - 120 U/L Final    Bilirubin, total 04/18/2022 0.3  0.2 - 1.2 mg/dL Final    Calcium 04/18/2022 9.9  8.4 - 10.5 mg/dL Final    Protein, total 04/18/2022 6.8  6.2 - 8.1 g/dL Final    Albumin 04/18/2022 4.4  3.5 - 5.0 g/dL Final    A-G Ratio 04/18/2022 1.8  1.1 - 2.6 ratio Final    Globulin 04/18/2022 2.4  2.0 - 4.0 g/dL Final    Anion gap 04/18/2022 12.0  3.0 - 15.0 mmol/L Final    Comment: Anion Gap calculation based on electrolyte reference ranges. Test includes Albumin, Alkaline Phosphatase, ALT, AST, BUN, Calcium, CO2,  Chloride, Creatinine, Glucose, Potassium, Sodium, Total Bilirubin and Total  Protein. Estimated GFR results are reported in mL/min/1.73 sq.m. by the MDRD equation. This eGFR is validated for stable chronic renal failure patients. This   equation  is unreliable in acute illness or patients with normal renal function.  GFRAA 04/18/2022 >60.0  >60.0 Final    GFRNA 04/18/2022 >60.0  >60.0 Final   Office Visit on 04/11/2022   Component Date Value Ref Range Status    HPV DNA Probe, High Risk 04/11/2022 Negative  Negative Final    Comment: The [facility]  HPV Test is a qualitative PCR and nucleic acid hybridization test  for the detection of 14 high-risk types of Human Papillomavirus (HPV) in  cervical specimens collected by a clinician using an endocervical   brush/spatula  and placed in the ThinPrep  Pap TestTM PreservCyt  Solution. The test can  specifically identify HPV16 and HPV18 while concurrently detecting the rest of  the high risk types (31, 33, 35, 39, 45, 51, 52, 56, 58, 59, 66 and 68). The  HPV16 and HPV18 will only be differentiated from other HPV high-risk types if  ordered as such. Persistent infection with human papillomavirus (HPV) is the principal cause of  cervical cancer and its precursor cervical intraepithelial neoplasia (MIGUEL A). The  presence of HPV has been implicated in greater than 99% of cervical cancers,  worldwide. Although persistent infection with high-risk (HR) HPV is a   condition  of cervical cancer only a small percentage of infections progress to these  disease states.   Sexually transm                           itted infection with HPV is extremely common,  with estimates of up to 75% of all women experiencing exposure to HPV at some  point. However, almost all of infected women will mount an effective immune  response and clear the infection within 2 years without any long term health  consequences. SOURCE: CERVICAL        PAP IMAGE GUIDED 04/11/2022    Final    Comment: GYN CASE REPORT:    Gynecologic Cytology Report                       Case: [id number]  Authorizing Provider:  Maggie Charles MD      Collected:  04/11/2022 1218  Ordering Location:     [facility]    [facility]  [facility]  First Screen:          Muriel Daly CT                         (ASCP)  Specimen:    Image Guided TP, Cervical    FINAL DIAGNOSIS:    Negative for Intraepithelial Lesion or Malignancy (NIL)  Electronically signed by RAHEEM Ren (ASCP) on 4/12/2022 at  9:54   AM    GYN ADEQUACY:    Satisfactory for evaluation of an atrophic sample. PAP NOTE:    The PAP test is a screening test that aids detection of pre-malignant and  malignant conditions of the uterine cervix. The PAP test is not a diagnostic  procedure and should not be used exclusively to detect cervical cancer. Both  false-positive and false-negative reports do occur. This Liquid based ThinPrep pap test was screened with th                           e use of an image  guided system (FileThis). GYN SPECIMEN RECEIVED:    Received 1 ThinPrep vial.  Prepared 1 pap stained filter slide. LMP:    Not Provided              Follow-up and Dispositions    · Return in about 6 months (around 11/13/2022).

## 2022-05-13 NOTE — PROGRESS NOTES
Chief Complaint   Patient presents with    Diabetes Holly Darby is a 60 y.o. female (: 1960) presenting to address:    Chief Complaint   Patient presents with    Follow-up       Vitals:    22 0925   Temp: 97.3 °F (36.3 °C)   Weight: 187 lb (84.8 kg)   Height: 5' 2\" (1.575 m)   PainSc:   6   PainLoc: Shoulder       Hearing/Vision:   No exam data present    Learning Assessment:     Learning Assessment 2019   PRIMARY LEARNER Patient   PRIMARY LANGUAGE ENGLISH   LEARNER PREFERENCE PRIMARY PICTURES     LISTENING     READING     DEMONSTRATION     VIDEOS   ANSWERED BY patient   RELATIONSHIP SELF     Depression Screening:     3 most recent PHQ Screens 2022   Little interest or pleasure in doing things Not at all   Feeling down, depressed, irritable, or hopeless Not at all   Total Score PHQ 2 0     Fall Risk Assessment:     Fall Risk Assessment, last 12 mths 2022   Able to walk? Yes   Fall in past 12 months? 0   Do you feel unsteady? 0   Are you worried about falling 0   Number of falls in past 12 months -   Fall with injury? -     Abuse Screening:     Abuse Screening Questionnaire 2022   Do you ever feel afraid of your partner? N   Are you in a relationship with someone who physically or mentally threatens you? N   Is it safe for you to go home?  Y     ADL Assessment:     ADL Assessment 2019   Feeding yourself No Help Needed   Getting from bed to chair No Help Needed   Getting dressed No Help Needed   Bathing or showering No Help Needed   Walk across the room (includes cane/walker) No Help Needed   Using the telphone No Help Needed   Taking your medications No Help Needed   Preparing meals No Help Needed   Managing money (expenses/bills) No Help Needed   Moderately strenuous housework (laundry) No Help Needed   Shopping for personal items (toiletries/medicines) No Help Needed   Shopping for groceries No Help Needed   Driving No Help Needed   Climbing a flight of stairs No Help Needed   Getting to places beyond walking distances No Help Needed        Coordination of Care Questionaire:   1. \"Have you been to the ER, urgent care clinic since your last visit? Hospitalized since your last visit? \" No    2. \"Have you seen or consulted any other health care providers outside of the [address] [place] [address] since your last visit? \" No     3. For patients aged 39-70: Has the patient had a colonoscopy / FIT/ Cologuard? Yes - no Care Gap present      If the patient is female:    4. For patients aged 41-77: Has the patient had a mammogram within the past 2 years? Yes - no Care Gap present  See top three    5. For patients aged 21-65: Has the patient had a pap smear? Yes - no Care Gap present    Advanced Directive:   1. Do you have an Advanced Directive? NO    2. Would you like information on Advanced Directives?  NO

## 2022-05-16 ENCOUNTER — TELEPHONE (OUTPATIENT)
Dept: FAMILY MEDICINE CLINIC | Age: 62
End: 2022-05-16

## 2022-05-16 NOTE — TELEPHONE ENCOUNTER
Spoke w/pt and she will come in to leave another urine sample; first sample was placed in wrong container; order is in the chart.

## 2022-05-24 ENCOUNTER — HOSPITAL ENCOUNTER (OUTPATIENT)
Dept: PHYSICAL THERAPY | Age: 62
Discharge: HOME | End: 2022-05-24
Payer: COMMERCIAL

## 2022-05-24 PROCEDURE — 97162 PT EVAL MOD COMPLEX 30 MIN: CPT

## 2022-05-24 NOTE — PROGRESS NOTES
[address], [facility], [facility] - Phone: [phone]  Fax: [phone] / [address] [place] [address] PHYSICAL THERAPY SERVICES  Patient Name: Marlene Singh : 1960   Medical   Diagnosis: Right thigh pain [M79.651] Treatment Diagnosis: R thigh pain   Onset Date:      Referral Source: Sumi Montes MD [place] [facility]): 2022   Prior Hospitalization: See medical history Provider #: [id number]   Prior Level of Function: Able to negotiate stairs and stand pain free   Comorbidities: Arthritis, BMI >30, thyroid problems, weight change over 10lbs   Medications: Verified on Patient Summary List   The Plan of Care and following information is based on the information from the initial evaluation.   ===========================================================================================  Assessment / key information:  Marlene Singh is a 60 y.o. female with Dx: R hip/groin pain starting 6 months ago gradually. Had R hip replacement summer . During hip replacement, surgeon realized there were 2 muscles that had torn away; those were reattached during surgery and pt notes she hasn't had pain since then. Notes she now feels similar pain she had prior to hip replacement and is worried about a muscle being torn again. Denies recent imaging but was given prednisone which didn't help per pt report. Notes putting pants on, going up stairs, and sit to stands make pain worse. Also has arthritis in the L knee; plans to get knee replacement this summer. Pt also notes she has gained 40lbs this year because of stress (taking care of parents and job changed in October). Pt rates pain as 8/10 max, 0/10 at best, 0/10 currently. Occupation:   Objective: FOTO score = 52 (an established functional score where 100 = no disability).   Gait: decreased stance time on RLE, decreased hip ext. Palpation: TTP along R adductor mass, R hip flexors and R TFL. Hip ROM Right: Flexion in supine R 125 L 125 (AROM seated EOB with hip 90/90 R 11 with pain L 40), Extension to neutral bilat, IR R 32 L 30, ER R 11 (PROM to ~30) L 26. Strength: Hip Flexion R 2/5 with pain (heel 2 inches from mat) L 4/5, ABD R 3+/5 L 4-/5, Extension R 3+/5 L 4-/5, Knee Flexion R 3+/5 L 5/5, Extension R 4-/5 L 5/5. Functional Squat knee angle 80. SLS R 10sec L 10sec. Current deficits include decreased strength and AROM in R hip leading to difficulty with ADLs and self care. Pt may benefit from further imaging to rule out muscle tear due to [address] if no improvement is seen within 4-6 weeks; pt demo understanding. Pt will benefit from a comprehensive POC/HEP to address impairments and restore function in order to return to prior level of function and prevent secondary impairments.  ===========================================================================================  Eval Complexity: History HIGH Complexity :3+ comorbidities / personal factors will impact the outcome/ POC ;  Examination  HIGH Complexity : 4+ Standardized tests and measures addressing body structure, function, activity limitation and / or participation in recreation ; Presentation MEDIUM Complexity : Evolving with changing characteristics ;   Decision Making MEDIUM Complexity : FOTO score of 26-74; Overall Complexity MEDIUM  Problem List: pain affecting function, decrease ROM, decrease strength, impaired gait/ balance, decrease ADL/ functional abilitiies, decrease activity tolerance, decrease flexibility/ joint mobility, and decrease transfer abilities   Treatment Plan may include any combination of the following: Therapeutic exercise, Therapeutic activities, Neuromuscular re-education, Physical agent/modality, Gait/balance training, Manual therapy, Aquatic therapy, Patient education, Self Care training, Functional mobility training, Home safety training, and Stair training  Patient / Family readiness to learn indicated by: asking questions, trying to perform skills, and interest  Persons(s) to be included in education: patient (P)  Barriers to Learning/Limitations: None  Measures taken, if barriers to learning:    Patient Goal (s): Reduce pain when climbing stairs, getting dress, rising from seated position   Patient self reported health status: good  Rehabilitation Potential: good   Short Term Goals: To be accomplished in  3  weeks:  1. Independent with HEP to progress to meet goals. 2. Pt to report decreased max pain levels less than 6/10 for improvement in ADLs.  Long Term Goals: To be accomplished in  6  weeks:  1. Improve FOTO score to 65/100 to show a significant functional change. 2. Pt to report decreased max pain levels less than 3/10 for improvement in QoL. 3. Pt to demonstrate AROM of R hip flexion at 90/90 seated EOB to 30 deg for improvement in strength and transfers. Frequency / Duration:   Patient to be seen  2  times per week for 6  weeks:  Patient / Caregiver education and instruction: self care, activity modification, and exercises  Therapist Signature: Lb Parks PT , DPT Date: 6/70/4930   Certification Period: na Time: 12:47 PM   ===========================================================================================  I certify that the above Physical Therapy Services are being furnished while the patient is under my care. I agree with the treatment plan and certify that this therapy is necessary. Physician Signature:        Date:       Time:                                        Gibson Avila MD  Please sign and return to [facility] at [facility], [facility]. or you may fax the signed copy to [phone]. Thank you.

## 2022-05-24 NOTE — PROGRESS NOTES
PHYSICAL THERAPY - DAILY TREATMENT NOTE    Patient Name: Ligia Jurado        Date: 2022  : 1960   yes Patient  Verified  Visit #:      12  Insurance: Payor: Cong Vasquez / Plan: [place] / Product Type: PPO /      In time: 340 Out time: 410   Total Treatment Time: 30     TREATMENT AREA =  Right thigh pain [M79.651]    SUBJECTIVE  Pain Level (on 0 to 10 scale):  0  / 10   Medication Changes/New allergies or changes in medical history, any new surgeries or procedures?    no  If yes, update Summary List   Subjective Functional Status/Changes:  []  No changes reported     See POC          OBJECTIVE  5 min Self Care: Pt education on HEP, POC, prognosis, and diagnosis. Rationale:    Improve pt understanding to improve the patients ability to progress therapy at home. Billed With/As:   [] TE   [] TA   [] Neuro   [x] Self Care Patient Education: [x] Review HEP    [] Progressed/Changed HEP based on:   [] positioning   [] body mechanics   [] transfers   [] heat/ice application    [] other:      Other Objective/Functional Measures:    eval performed     Post Treatment Pain Level (on 0 to 10) scale:   0  / 10     ASSESSMENT  Assessment/Changes in Function:     See POC     []  See Progress Note/Recertification   Patient will continue to benefit from skilled PT services to modify and progress therapeutic interventions, address functional mobility deficits, analyze and cue movement patterns, and analyze and modify body mechanics/ergonomics to attain remaining goals.    Progress toward goals / Updated goals:    See POC     PLAN  [x]  Upgrade activities as tolerated yes Continue plan of care   []  Discharge due to :    []  Other:      Therapist: Jesus Hurst, PT , DPT    Date: 2022 Time: 12:46 PM     Future Appointments   Date Time Provider Jermaine Tucker   2022  3:30 PM Nupur Phillips, PT [facility]

## 2022-06-07 ENCOUNTER — HOSPITAL ENCOUNTER (OUTPATIENT)
Dept: PHYSICAL THERAPY | Age: 62
Discharge: HOME | End: 2022-06-07
Payer: COMMERCIAL

## 2022-06-07 PROCEDURE — 97110 THERAPEUTIC EXERCISES: CPT

## 2022-06-07 PROCEDURE — 97535 SELF CARE MNGMENT TRAINING: CPT

## 2022-06-07 PROCEDURE — 97140 MANUAL THERAPY 1/> REGIONS: CPT

## 2022-06-07 NOTE — PROGRESS NOTES
PHYSICAL THERAPY - DAILY TREATMENT NOTE    Patient Name: Darrell Uribe        Date: 2022  : 1960   YES Patient  Verified  Visit #:   2  of   12  Insurance: Payor: [employer] / Plan: [place] [employer]PO / Product Type: PPO /      In time: 4:30 Out time: 5:25   Total Treatment Time: 55     TREATMENT AREA =  Right thigh pain [M79.651]    SUBJECTIVE  Pain Level (on 0 to 10 scale):  0  / 10   Medication Changes/New allergies or changes in medical history, any new surgeries or procedures? NO If yes, update Summary List   Subjective Functional Status/Changes:  []  No changes reported     Says she's fine with walking and standing. Just has trouble with getting up, stairs and lifting the right leg. She can only lift it a little bit. She's been avoiding doing things bc of fear it is torn. Relays Hs of Right BERNIE and mm repair in 2020 with recent worsening in right hip function. Reporting right sided LBP, and some painful urination intermittently. OBJECTIVE     35 min Therapeutic Exercise:  [x]  See flow sheet   Rationale:      increase strength to improve the patients ability to move the hip for improved transfer/stairs     10 min Manual Therapy: Assisted motion. Light STM to TFL and anterior/superior Quad   Rationale:      decrease pain and increase tissue extensibility to improve patient's ability to tolerate loading of the hip mm towards improved strength  The manual therapy interventions were performed at a separate and distinct time from the therapeutic activities interventions. 10 min Self Care: Activity modification, Pt Ed, HEP   Rationale:    increase strength and improve coordination to improve the patients ability to make gains btw visits      Billed With/As:   [] TE   [] TA   [] Neuro   [] Self Care Patient Education: [x] Review HEP    [] Progressed/Changed HEP based on:   [] Addressed barriers and behaviors     [] Therapeutic Neuroscience Pain Education, metaphor, reframing, contexts. [] Sleep Education   [] Body Mechanics [] Healing Timeframe     [] Self STM with ball at \"the spot\"  [] Global Activity   [] other:      Other Objective/Functional Measures:    See Flowsheet for drills, loads and volume. LQS:  Dermatome: L2: R: Diminished, L: Intact  Remaining dermatomes intact bilat. Reflexes: L4: 2+ bilat  S1: R: 1+, L: 2+    MMT:  Great toe ext: R: 4+/5, L: 5/5    Hip flexion, ABD and ext are 2/5     HEP:  Access Code: [email or web address]  URL: [email or web address]  Date: 06/07/2022  Prepared by: Crys He    Exercises  Supine Bridge - 1 x daily - 7 x weekly - 2 sets - 10 reps  Clamshell - 1 x daily - 7 x weekly - 2 sets - 10 reps  Supine Heel Slide - 1 x daily - 7 x weekly - 2 sets - 10 reps       Post Treatment Pain Level (on 0 to 10) scale:   0  / 10     ASSESSMENT  Assessment/Changes in Function:     Chart reviewed and subjective taken. Pt required skilled instruction for initiation, form and follow-through for all drills in session. Pt responds well to instruction and demo. Assessed lower quarter screen and gross muscle testing to rule in/rule out radicular component. Unclear based on objective findings this date, but discussed with patient to continue with a strengthening protocol to the R LE. Will monitor and assess patient presentation upon subsequent visits. []  See Progress Note/Recertification   Patient will continue to benefit from skilled PT services to modify and progress therapeutic interventions, address functional mobility deficits, address ROM deficits, address strength deficits, analyze and address soft tissue restrictions, analyze and cue movement patterns, analyze and modify body mechanics/ergonomics and assess and modify postural abnormalities to attain remaining goals. Progress toward goals / Updated goals: · Short Term Goals: To be accomplished in  3  weeks:  1. Independent with HEP to progress to meet goals. Initiated today.   2. Pt to report decreased max pain levels less than 6/10 for improvement in ADLs. · Long Term Goals: To be accomplished in  6  weeks:  1. Improve FOTO score to 65/100 to show a significant functional change. 2. Pt to report decreased max pain levels less than 3/10 for improvement in QoL. 3. Pt to demonstrate AROM of R hip flexion at 90/90 seated EOB to 30 deg for improvement in strength and transfers.      PLAN  [x]  Upgrade activities as tolerated YES Continue plan of care   []  Discharge due to :    []  Other:      Therapist: Kristina Ambriz PT    Date: 6/7/2022 Time: 4:31 PM     Future Appointments   Date Time Provider Jermaine Tucker   6/14/2022  2:45 PM Kenney Needs, PT [facility]   6/16/2022  3:00 PM Nikki Harp, PT [facility]   6/21/2022 10:30 AM Kenney Needs, PT [facility]   6/23/2022 11:15 AM Kenney Needs, PT [facility]   6/28/2022 10:30 AM Kenney Needs, PT [facility]   6/30/2022 11:15 AM Kenney Needs, PT [facility]   7/5/2022 10:30 AM Kenney Needs, PT [facility]   7/7/2022 11:15 AM Kenney Needs, PT [facility]   7/7/2022  2:45 PM Viviana Key MD [address]   7/11/2022 10:15 AM Nikki Harp, PT [facility]   7/13/2022 11:00 AM Shahzad Montero, PT [facility] [address]

## 2022-06-14 ENCOUNTER — APPOINTMENT (OUTPATIENT)
Dept: PHYSICAL THERAPY | Age: 62
End: 2022-06-14
Payer: COMMERCIAL

## 2022-06-16 ENCOUNTER — HOSPITAL ENCOUNTER (OUTPATIENT)
Dept: PHYSICAL THERAPY | Age: 62
Discharge: HOME | End: 2022-06-16
Payer: COMMERCIAL

## 2022-06-16 PROCEDURE — 97110 THERAPEUTIC EXERCISES: CPT

## 2022-06-16 PROCEDURE — 97140 MANUAL THERAPY 1/> REGIONS: CPT

## 2022-06-16 PROCEDURE — 97535 SELF CARE MNGMENT TRAINING: CPT

## 2022-06-16 NOTE — PROGRESS NOTES
PHYSICAL THERAPY - DAILY TREATMENT NOTE    Patient Name: Darrell Uribe        Date: 2022  : 1960   YES Patient  Verified  Visit #:   3  of   12  Insurance: Payor: Childress [employer] / Plan: [place] [employer] PPO / Product Type: PPO /      In time: 3:00 Out time: 3:45   Total Treatment Time: 45     TREATMENT AREA =  Right thigh pain [M79.651]    SUBJECTIVE  Pain Level (on 0 to 10 scale):  0  / 10   Medication Changes/New allergies or changes in medical history, any new surgeries or procedures? NO If yes, update Summary List   Subjective Functional Status/Changes:  []  No changes reported     Pt stated that she has been doing her exercises and has helped tremendously and is able to move around more. OBJECTIVE     25 (15 ) min Therapeutic Exercise:  [x]  See flow sheet   Rationale:      increase strength to improve the patients ability to move the hip for improved transfer/stairs     10 min Manual Therapy: Assisted motion. Light STM to TFL and anterior/superior Quad   Rationale:      decrease pain and increase tissue extensibility to improve patient's ability to tolerate loading of the hip mm towards improved strength  The manual therapy interventions were performed at a separate and distinct time from the therapeutic activities interventions. 10 min Self Care: Activity modification, Pt Ed, HEP   Rationale:    increase strength and improve coordination to improve the patients ability to make gains btw visits      Billed With/As:   [] TE   [] TA   [] Neuro   [] Self Care Patient Education: [x] Review HEP    [] Progressed/Changed HEP based on:   [] Addressed barriers and behaviors     [] Therapeutic Neuroscience Pain Education, metaphor, reframing, contexts. [] Sleep Education   [] Body Mechanics [] Healing Timeframe     [] Self STM with ball at \"the spot\"  [] Global Activity   [] other:      Other Objective/Functional Measures:    See Flowsheet for drills, loads and volume.       HEP:  Access Code: [email or web address]  URL: [email or web address]  Date: 06/07/2022  Prepared by: Brittnee Terrell    Exercises  Supine Bridge - 1 x daily - 7 x weekly - 2 sets - 10 reps  Clamshell - 1 x daily - 7 x weekly - 2 sets - 10 reps  Supine Heel Slide - 1 x daily - 7 x weekly - 2 sets - 10 reps       Post Treatment Pain Level (on 0 to 10) scale:   0  / 10     ASSESSMENT  Assessment/Changes in Function:     Student Physical therapist Supervised during session. Patient tolerated and performed well with all the exercises this date and demonstrates increased gross strength and ROM with S/L abduction. Will continue to adjust POC to address her functional deficits. Success is positive as pt reports, \"I feel this is really helping me out. \"   []  See Progress Note/Recertification   Patient will continue to benefit from skilled PT services to modify and progress therapeutic interventions, address functional mobility deficits, address ROM deficits, address strength deficits, analyze and address soft tissue restrictions, analyze and cue movement patterns, analyze and modify body mechanics/ergonomics and assess and modify postural abnormalities to attain remaining goals. Progress toward goals / Updated goals: · Short Term Goals: To be accomplished in  3  weeks:  1. Independent with HEP to progress to meet goals. Initiated today. 2. Pt to report decreased max pain levels less than 6/10 for improvement in ADLs. · Long Term Goals: To be accomplished in  6  weeks:  1. Improve FOTO score to 65/100 to show a significant functional change. 2. Pt to report decreased max pain levels less than 3/10 for improvement in QoL. 3. Pt to demonstrate AROM of R hip flexion at 90/90 seated EOB to 30 deg for improvement in strength and transfers.      PLAN  [x]  Upgrade activities as tolerated YES Continue plan of care   []  Discharge due to :    []  Other:      Therapist: VALENTINO Francis    Date: 6/16/2022 Time: 4:31 PM Future Appointments   Date Time Provider Jermaine Tucker   6/21/2022 10:30 AM Carlos Andino, PT [facility]   6/23/2022 11:15 AM Carlos Andino PT [facility]   6/28/2022 10:30 AM Carlos Andino PT [facility]   6/30/2022 11:15 AM Carlos Andino PT [facility]   7/5/2022 10:30 AM Carlos Andino PT [facility]   7/7/2022 11:15 AM Carlos Andino PT [facility]   7/7/2022  2:45 PM Giovani Live MD [place] [id number]   7/11/2022 10:15 AM Amy Thomas, PT [facility]   7/13/2022 11:00 AM Clayton Montero, PT [facility] [address]

## 2022-06-21 ENCOUNTER — APPOINTMENT (OUTPATIENT)
Dept: PHYSICAL THERAPY | Age: 62
End: 2022-06-21
Payer: COMMERCIAL

## 2022-06-23 ENCOUNTER — APPOINTMENT (OUTPATIENT)
Dept: PHYSICAL THERAPY | Age: 62
End: 2022-06-23
Payer: COMMERCIAL

## 2022-06-28 ENCOUNTER — HOSPITAL ENCOUNTER (OUTPATIENT)
Dept: PHYSICAL THERAPY | Age: 62
Discharge: HOME | End: 2022-06-28
Payer: COMMERCIAL

## 2022-06-28 PROCEDURE — 97140 MANUAL THERAPY 1/> REGIONS: CPT

## 2022-06-28 PROCEDURE — 97110 THERAPEUTIC EXERCISES: CPT

## 2022-06-28 NOTE — PROGRESS NOTES
PHYSICAL THERAPY - DAILY TREATMENT NOTE    Patient Name: Evelyne Hood        Date: 2022  : 1960   YES Patient  Verified  Visit #:     Insurance: Payor: Lisha Rivera / Plan: [place] [employer] PPO / Product Type: PPO /      In time: 10:32 Out time: 11:07   Total Treatment Time: 35     TREATMENT AREA =  Right thigh pain [M79.651]    SUBJECTIVE  Pain Level (on 0 to 10 scale):  0  / 10   Medication Changes/New allergies or changes in medical history, any new surgeries or procedures? NO If yes, update Summary List   Subjective Functional Status/Changes:  []  No changes reported     Pt reports she hasn't been doing her exercises since she was sick. Has been seeing improvements. OBJECTIVE  25 min Therapeutic Exercise:  [x]  See flow sheet   Rationale:      increase strength to improve the patients ability to move the hip for improved transfer/stairs     10 min Manual Therapy: STM to TFL and anterior/superior Quad   Rationale:      decrease pain and increase tissue extensibility to improve patient's ability to tolerate loading of the hip mm towards improved strength  The manual therapy interventions were performed at a separate and distinct time from the therapeutic activities interventions. Billed With/As:   [] TE   [] TA   [] Neuro   [] Self Care Patient Education: [x] Review HEP    [] Progressed/Changed HEP based on:   [] Addressed barriers and behaviors     [] Therapeutic Neuroscience Pain Education, metaphor, reframing, contexts. [] Sleep Education   [] Body Mechanics [] Healing Timeframe     [] Self STM with ball at \"the spot\"  [] Global Activity   [] other:      Other Objective/Functional Measures:    See Flowsheet for drills, loads and volume.   Decreased pain with clam shells after manually resisted eccentric SL clams     Post Treatment Pain Level (on 0 to 10) scale:   0  / 10     ASSESSMENT  Assessment/Changes in Function:     See PN     [x]  See Progress Note/Recertification Patient will continue to benefit from skilled PT services to modify and progress therapeutic interventions, address functional mobility deficits, address ROM deficits, address strength deficits, analyze and address soft tissue restrictions, analyze and cue movement patterns, analyze and modify body mechanics/ergonomics and assess and modify postural abnormalities to attain remaining goals. Progress toward goals / Updated goals:    1. Improve FOTO score to 65/100 to show a significant functional change. 2. Pt to report decreased max pain levels less than 3/10 for improvement in QoL. 3. Pt to demonstrate AROM of R hip flexion at 90/90 seated EOB to 18 deg for improvement in strength and transfers.      PLAN  [x]  Upgrade activities as tolerated YES Continue plan of care   []  Discharge due to :    []  Other:      Therapist: Roxanne Rivera PT    Date: 6/28/2022 Time: 4:31 PM     Future Appointments   Date Time Provider Jermaine Tucker   6/28/2022 10:30 AM Irina Cisneros PT [place] [address]   6/30/2022 11:15 AM Irina Cisneros, PT [facility]   7/5/2022 10:30 AM Irina Cisneros, PT [facility]   7/7/2022 11:15 AM Irina Cisneros PT [facility]   7/7/2022  2:45 PM Micheline Davila MD [address]   7/11/2022 10:15 AM Patricio Bashir, PT [facility]   7/13/2022 11:00 AM Marquis Louie Montero, PT [facility] [address]

## 2022-06-28 NOTE — PROGRESS NOTES
201 [facility] PHYSICAL THERAPY  [address] [place] [address] Yasmin Garnica [facility] 25 201,[place], 70 [facility] - Phone: [phone]  Fax: [phone]  PROGRESS NOTE  Patient Name: Parker Zarate : 1960   Treatment/Medical Diagnosis: Right thigh pain [M79.651]   Referral Source: Deniz Orosco MD     Date of Initial Visit: 22 Attended Visits: 4 Missed Visits: 1     SUMMARY OF TREATMENT  Pt was seen for IE and 3 f/u visits with treatment consisting of manual therapy, therapeutic exercises, therapeutic activities, neuromuscular reeducation, and self care techniques to improve R hip strength and stability along with instruction of HEP. CURRENT STATUS  Pt has made good progress in PT as demonstrated by decreased max pain levels at 3/10 and average pain level of 2-3/10. Pt report 50% improvement since beginning therapy. Pt also notes ascending stairs has gotten easier, however continues to report it is painful. Also reports sit to stands are easier as she can stand without using UE. Pt notes they were completing their HEP daily prior to getting sick and was noticing great improvement, however could not complete HEP while ill. She will continue a consistent HEP this week. Pt will continue to benefit from skilled PT to progress exercises and improve upon R hip strength. Goal/Measure of Progress Goal Met? 1. Independent with HEP to progress to meet goals. Status at last Eval: Goal establisehd Current Status: complaint yes   2. Pt to report decreased max pain levels less than 6/10 for improvement in ADLs. Status at last Eval: 8/10 Current Status: 3/10 yes   3. Pt to demonstrate AROM of R hip flexion at 90/90 seated EOB to 18 deg for improvement in strength and transfers. Status at last Eval: Goal established Current Status: 10 Progressing     New Goals to be achieved in __2__  weeks:  1. Improve FOTO score to 65/100 to show a significant functional change.   2. Pt to report decreased max pain levels less than 2/10 for improvement in QoL. 3. Pt to demonstrate AROM of R hip flexion at 90/90 seated EOB to 18 deg for improvement in strength and transfers. RECOMMENDATIONS  Continue with therapy 1x/week for 2 weeks to further improve upon R hip strength and functional activities. Please advise. Thank you. If you have any questions/comments please contact us directly at [phone]. Thank you for allowing us to assist in the care of your patient. Therapist Signature: Qasim Mcdonnell PT, DPT Date: 6/28/2022     Time: 9:25 AM   NOTE TO PHYSICIAN:  PLEASE COMPLETE THE ORDERS BELOW AND FAX TO   [facility]: [phone]  If you are unable to process this request in 24 hours please contact our office: [phone]    ___ I have read the above report and request that my patient continue as recommended.   ___ I have read the above report and request that my patient continue therapy with the following changes/special instructions:_________________________________________________________   ___ I have read the above report and request that my patient be discharged from therapy.      Physician Signature:        Date:       Time:                                 Kemi Lebron MD

## 2022-06-30 ENCOUNTER — APPOINTMENT (OUTPATIENT)
Dept: PHYSICAL THERAPY | Age: 62
End: 2022-06-30
Payer: COMMERCIAL

## 2022-07-05 ENCOUNTER — APPOINTMENT (OUTPATIENT)
Dept: PHYSICAL THERAPY | Age: 62
End: 2022-07-05
Payer: COMMERCIAL

## 2022-07-07 ENCOUNTER — HOSPITAL ENCOUNTER (OUTPATIENT)
Dept: PHYSICAL THERAPY | Age: 62
Discharge: HOME | End: 2022-07-07
Payer: COMMERCIAL

## 2022-07-07 PROCEDURE — 97110 THERAPEUTIC EXERCISES: CPT

## 2022-07-07 PROCEDURE — 97535 SELF CARE MNGMENT TRAINING: CPT

## 2022-07-07 NOTE — PROGRESS NOTES
201 [facility] PHYSICAL THERAPY  [address] [place] [address] Caroline Garnicaøisabela [facility] 25 201,[facility], 70 [facility] - Phone: [phone]  Fax: [phone]  DISCHARGE NOTE  Patient Name: Precious Guzman : 1960   Treatment/Medical Diagnosis: Right thigh pain [M79.651]   Referral Source: Sanjuana Reynolds MD     Date of Initial Visit: 22 Attended Visits: 5 Missed Visits: 1     SUMMARY OF TREATMENT  Pt was seen for IE and 4 f/u visits with treatment consisting of manual therapy, therapeutic exercises, therapeutic activities, neuromuscular reeducation, and self care techniques to improve R hip strength and stability along with instruction of HEP. CURRENT STATUS  Pt has made good progress in PT as demonstrated by decreased max pain levels at 2/10 and average pain level of 2/10. Pt reports 50-60% improvement since beginning therapy. Pt notes getting in and out of the car continue to be most difficult, however has improved along with all other functional activities. Below are objective data gathered from today's visit. She will continue with I HEP at home to address remaining deficits. Objective data:  Hip flexion goni (seated EOB) R: 30 deg, L: 45 deg  Right global hip strength: 4+/5 in all planes except 2/5 for Hip ER and 3+/5 for hip flexion  Light touch sensation: intact and symmetrical except at the L1 and S1 dermatome patterns    Goal/Measure of Progress Goal Met? 1. Improve FOTO score to 65/100 to show a significant functional change. Status at last Eval: 52 Current Status: 75 yes   2. Pt to report decreased max pain levels less than 2/10 for improvement in ADLs. Status at last Eval: 3/10 Current Status: 2/10 yes   3. Pt to demonstrate AROM of R hip flexion at 90/90 seated EOB to 18 deg for improvement in strength and transfers.    Status at last Eval: Goal established Current Status: R 30, L 45 yes       RECOMMENDATIONS  DC pt at this time due to meeting all goals and being I with HEP. If you have any questions/comments please contact us directly at [phone]. Thank you for allowing us to assist in the care of your patient. Therapist Signature: Faith Gomez PT, DPT Date: 7/7/2022     Time: 9:25 AM   NOTE TO PHYSICIAN:  PLEASE COMPLETE THE ORDERS BELOW AND FAX TO   [facility]: [phone]  If you are unable to process this request in 24 hours please contact our office: [phone]    ___ I have read the above report and request that my patient continue as recommended.   ___ I have read the above report and request that my patient continue therapy with the following changes/special instructions:_________________________________________________________   ___ I have read the above report and request that my patient be discharged from therapy.      Physician Signature:        Date:       Time:                                 Jayleen Melchor MD

## 2022-07-07 NOTE — PROGRESS NOTES
PHYSICAL THERAPY - DAILY TREATMENT NOTE    Patient Name: Isaiah Links        Date: 2022  : 1960   YES Patient  Verified  Visit #:      12  Insurance: Payor: Sivakumar Villavicencio / Plan: [place] [employer] PPO / Product Type: PPO /      In time: 11:15 Out time: 11:47   Total Treatment Time: 32     TREATMENT AREA =  Right thigh pain [M79.651]    SUBJECTIVE  Pain Level (on 0 to 10 scale):  0  / 10   Medication Changes/New allergies or changes in medical history, any new surgeries or procedures? NO If yes, update Summary List   Subjective Functional Status/Changes:  []  No changes reported     Pt reports she hasn't been doing her exercises since she was sick. Has been seeing improvements. OBJECTIVE  8 min Therapeutic Exercise:  [x]  See flow sheet   Rationale:      increase strength to improve the patients ability to move the hip for improved transfer/stairs     24 min Self Care: Reassessment of goals, DC instructions, review of updated HEP   Rationale:    Pt education to improve the patients ability to progress PT. Billed With/As:   [] TE   [] TA   [] Neuro   [x] Self Care Patient Education: [x] Review HEP    [x] Progressed/Changed HEP based on:   [] Addressed barriers and behaviors     [] Therapeutic Neuroscience Pain Education, metaphor, reframing, contexts.     [] Sleep Education   [] Body Mechanics [] Healing Timeframe     [] Self STM with ball at \"the spot\"  [] Global Activity   [] other:      Other Objective/Functional Measures:    See DC     Post Treatment Pain Level (on 0 to 10) scale:   0  / 10     ASSESSMENT  Assessment/Changes in Function:     See DC     []  See Progress Note/Recertification   Patient will continue to benefit from skilled PT services to modify and progress therapeutic interventions, address functional mobility deficits, address ROM deficits, address strength deficits, analyze and address soft tissue restrictions, analyze and cue movement patterns, analyze and modify body mechanics/ergonomics and assess and modify postural abnormalities to attain remaining goals.    Progress toward goals / Updated goals:    See DC     PLAN  []  Upgrade activities as tolerated no Continue plan of care   [x]  Discharge due to : Meeting all goals and being I with HEP   []  Other:      Therapist: Ermias Boston PT, DPT    Date: 7/7/2022 Time: 4:31 PM     Future Appointments   Date Time Provider Jermaine Tucker   7/7/2022 11:15 AM Kimberli Vazquez PT [facility]   7/7/2022  2:45 PM Danny Caraballo MD 2011 [facility]

## 2022-07-11 ENCOUNTER — APPOINTMENT (OUTPATIENT)
Dept: PHYSICAL THERAPY | Age: 62
End: 2022-07-11
Payer: COMMERCIAL

## 2022-07-13 ENCOUNTER — APPOINTMENT (OUTPATIENT)
Dept: PHYSICAL THERAPY | Age: 62
End: 2022-07-13
Payer: COMMERCIAL

## 2022-07-28 ENCOUNTER — TELEPHONE (OUTPATIENT)
Dept: FAMILY MEDICINE CLINIC | Age: 62
End: 2022-07-28

## 2022-07-28 DIAGNOSIS — E03.9 HYPOTHYROIDISM (ACQUIRED): ICD-10-CM

## 2022-07-28 RX ORDER — LEVOTHYROXINE SODIUM 75 UG/1
TABLET ORAL
Qty: 90 TABLET | Refills: 0 | Status: SHIPPED | OUTPATIENT
Start: 2022-07-28 | End: 2022-10-25

## 2022-07-28 NOTE — TELEPHONE ENCOUNTER
Amna Whitfield called for their medication refill.     Last Office visit:  5/13/2022    Last Filled: 4/18/2022; Qty 90 w/ 0 refills    Follow up visit:    Future Appointments   Date Time Provider Jermaine Tucker   10/5/2022  3:20 PM TIMI Fernandez

## 2022-07-28 NOTE — TELEPHONE ENCOUNTER
PT called stating that she was returning a call about something Dr Esteban Sharp had ordered back in may. I saw a few referral but pt did't know what one was the right one. Please advise.   Future Appointments   Date Time Provider Jermaine Tucker   10/5/2022  3:20 PM Shea Black, TIMI [address]

## 2022-07-28 NOTE — TELEPHONE ENCOUNTER
Requested Prescriptions     Pending Prescriptions Disp Refills    levothyroxine (SYNTHROID) 75 mcg tablet 90 Tablet 0     Sig: take 1 tablet by mouth once daily BEFORE BREAKFAST     Pt called to request a refill. She states she has 5 left. Please advise.      Future Appointments   Date Time Provider Jermaine Tucker   10/5/2022  3:20 PM Artem Black NP Ottie Coss

## 2022-07-29 NOTE — TELEPHONE ENCOUNTER
[facility] was sent in may of 2022. Informed pt that PCP put in an order for a pelvic ultrasound for her urine incontinence. Pt stated that she was seen by the urologist and do find it necessary to have the ultrasound unless PCP think that she should.

## 2022-08-01 NOTE — TELEPHONE ENCOUNTER
If she is already had seen neurologist and does not have any pelvic pain that is fine not to get the ultrasound

## 2022-09-06 ENCOUNTER — APPOINTMENT (OUTPATIENT)
Dept: FAMILY MEDICINE CLINIC | Age: 62
End: 2022-09-06

## 2022-09-06 ENCOUNTER — OFFICE VISIT (OUTPATIENT)
Dept: FAMILY MEDICINE CLINIC | Age: 62
End: 2022-09-06
Payer: COMMERCIAL

## 2022-09-06 VITALS
RESPIRATION RATE: 15 BRPM | DIASTOLIC BLOOD PRESSURE: 66 MMHG | WEIGHT: 184.8 LBS | OXYGEN SATURATION: 94 % | HEIGHT: 63 IN | TEMPERATURE: 98.5 F | HEART RATE: 87 BPM | SYSTOLIC BLOOD PRESSURE: 108 MMHG | BODY MASS INDEX: 32.74 KG/M2

## 2022-09-06 DIAGNOSIS — E03.9 HYPOTHYROIDISM (ACQUIRED): ICD-10-CM

## 2022-09-06 DIAGNOSIS — E55.9 VITAMIN D DEFICIENCY: ICD-10-CM

## 2022-09-06 DIAGNOSIS — R73.03 PREDIABETES: ICD-10-CM

## 2022-09-06 DIAGNOSIS — E78.5 HYPERLIPIDEMIA, UNSPECIFIED HYPERLIPIDEMIA TYPE: Primary | ICD-10-CM

## 2022-09-06 DIAGNOSIS — R06.02 SHORTNESS OF BREATH: ICD-10-CM

## 2022-09-06 PROCEDURE — 99214 OFFICE O/P EST MOD 30 MIN: CPT | Performed by: LEGAL MEDICINE

## 2022-09-06 NOTE — PROGRESS NOTES
Stacy Rodriguez is a 60 y.o. female (: 1960) presenting to address:    Chief Complaint   Patient presents with    Abdominal Pain       Vitals:    22 1502   BP: 108/66   Pulse: 87   Resp: 15   Temp: 98.5 °F (36.9 °C)   TempSrc: Temporal   SpO2: 94%   Weight: 184 lb 12.8 oz (83.8 kg)   Height: 5' 3\" (1.6 m)   PainSc:   0 - No pain       Hearing/Vision:   No results found. Learning Assessment:     Learning Assessment 2019   PRIMARY LEARNER Patient   PRIMARY LANGUAGE ENGLISH   LEARNER PREFERENCE PRIMARY PICTURES     LISTENING     READING     DEMONSTRATION     VIDEOS   ANSWERED BY patient   RELATIONSHIP SELF     Depression Screening:     3 most recent PHQ Screens 2022   Little interest or pleasure in doing things Not at all   Feeling down, depressed, irritable, or hopeless Not at all   Total Score PHQ 2 0   Trouble falling or staying asleep, or sleeping too much -   Feeling tired or having little energy -   Poor appetite, weight loss, or overeating -   Feeling bad about yourself - or that you are a failure or have let yourself or your family down -   Trouble concentrating on things such as school, work, reading, or watching TV -   Moving or speaking so slowly that other people could have noticed; or the opposite being so fidgety that others notice -   Thoughts of being better off dead, or hurting yourself in some way -   PHQ 9 Score -   How difficult have these problems made it for you to do your work, take care of your home and get along with others -     Fall Risk Assessment:     Fall Risk Assessment, last 12 mths 2022   Able to walk? Yes   Fall in past 12 months? 0   Do you feel unsteady? 0   Are you worried about falling 0   Number of falls in past 12 months -   Fall with injury? -     Abuse Screening:     Abuse Screening Questionnaire 2022   Do you ever feel afraid of your partner? N   Are you in a relationship with someone who physically or mentally threatens you?  N   Is it safe for you to go home? Y     ADL Assessment:     ADL Assessment 9/19/2019   Feeding yourself No Help Needed   Getting from bed to chair No Help Needed   Getting dressed No Help Needed   Bathing or showering No Help Needed   Walk across the room (includes cane/walker) No Help Needed   Using the telphone No Help Needed   Taking your medications No Help Needed   Preparing meals No Help Needed   Managing money (expenses/bills) No Help Needed   Moderately strenuous housework (laundry) No Help Needed   Shopping for personal items (toiletries/medicines) No Help Needed   Shopping for groceries No Help Needed   Driving No Help Needed   Climbing a flight of stairs No Help Needed   Getting to places beyond walking distances No Help Needed        Coordination of Care Questionaire:   1. \"Have you been to the ER, urgent care clinic since your last visit? Hospitalized since your last visit? \" No    2. \"Have you seen or consulted any other health care providers outside of the [address] [place] [address] Rafiq since your last visit? \"  Urology, Rx for premarin      3. For patients aged 39-70: Has the patient had a colonoscopy / FIT/ Cologuard? Yes - no Care Gap present    If the patient is female:    4. For patients aged 41-77: Has the patient had a mammogram within the past 2 years? Yes - no Care Gap present  See top three    5. For patients aged 21-65: Has the patient had a pap smear? Yes - no Care Gap present    Advanced Directive:   1. Do you have an Advanced Directive? NO    2. Would you like information on Advanced Directives?  NO

## 2022-09-06 NOTE — PROGRESS NOTES
[facility]     Chief Complaint   Patient presents with    Abdominal Pain     Vitals:    22 1502   BP: 108/66   Pulse: 87   Resp: 15   Temp: 98.5 °F (36.9 °C)   TempSrc: Temporal   SpO2: 94%   Weight: 184 lb 12.8 oz (83.8 kg)   Height: 5' 3\" (1.6 m)   PainSc:   0 - No pain         HPI: Meaghan Zavala  is here for abdominal boating , bleching ,passing gas gas occasional cramps, no  specific triggers , sympots has been for 3 month  getting worse she is not sure what is triggering her symptoms, overall she has not been eating healthy, she has been eating processed food as well as fast food, in addition to unhealthy snacks, high sugar diet. 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 pylori when she had similar symptoms, she has frequent bowel movement and she is not emptying her bowels fully  Shortness of breath on and off for 2 weeks more with exesion and at also rest , no cough no fever or chills  She was diagnosed with COVID in         Past Medical History:   Diagnosis Date    Macular degeneration      Past Surgical History:   Procedure Laterality Date    HX ABDOMINOPLASTY      HX HIP REPLACEMENT  2020    right      Social History     Tobacco Use    Smoking status: Former     Years: 10.00     Types: Cigarettes     Quit date: 1995     Years since quittin.8    Smokeless tobacco: Never   Substance Use Topics    Alcohol use: Yes     Comment: occassionally       Family History   Problem Relation Age of Onset    No Known Problems Mother     Heart Disease Father     Breast Cancer Maternal Aunt     Diabetes Maternal Aunt     COPD Maternal Aunt        Review of Systems   Constitutional:  Negative for chills, fever, malaise/fatigue and weight loss. HENT:  Negative for congestion, ear discharge, ear pain, hearing loss and nosebleeds. Eyes:  Negative for blurred vision, double vision and discharge.    Respiratory:  Negative for cough, hemoptysis, sputum production, shortness of breath and wheezing. Cardiovascular:  Negative for chest pain, palpitations, claudication and leg swelling. Gastrointestinal:  Negative for abdominal pain, constipation, diarrhea, nausea and vomiting. Genitourinary:  Negative for dysuria, frequency and urgency. Musculoskeletal:  Positive for joint pain. Negative for back pain, falls, myalgias and neck pain. Skin:  Negative for itching and rash. Neurological:  Negative for dizziness, tingling, sensory change, speech change, focal weakness, weakness and headaches. Physical Exam  Vitals and nursing note reviewed. Constitutional:       General: She is not in acute distress. Appearance: She is well-developed. She is not diaphoretic. HENT:      Head: Normocephalic and atraumatic. Neck:      Thyroid: No thyromegaly. Cardiovascular:      Rate and Rhythm: Normal rate and regular rhythm. Heart sounds: Normal heart sounds. Pulmonary:      Effort: Pulmonary effort is normal. No respiratory distress. Breath sounds: Normal breath sounds. No wheezing or rales. Chest:      Chest wall: No tenderness. Abdominal:      General: Bowel sounds are normal. There is no distension. Palpations: Abdomen is soft. There is no mass. Tenderness: There is no abdominal tenderness. There is no right CVA tenderness, left CVA tenderness, guarding or rebound. Musculoskeletal:         General: No swelling, tenderness or deformity. Normal range of motion. Right lower leg: No edema. Left lower leg: No edema. Lymphadenopathy:      Cervical: No cervical adenopathy. Skin:     General: Skin is warm and dry. Coloration: Skin is not pale. Findings: No erythema or rash. Neurological:      Mental Status: She is alert and oriented to person, place, and time. Cranial Nerves: No cranial nerve deficit.       Coordination: Coordination normal.   Psychiatric:         Mood and Affect: Mood normal.         Behavior: Behavior normal.         Thought Content: Thought content normal.         Judgment: Judgment normal.        Assessment and plan     Plan of care has been discussed with the patient, he agrees to the plan and verbalized understanding. All his questions were answered  More than 50% of the time spent in this visit was counseling the patient about  illness and treatment options     Abdominal bloating    I have reviewed and discussed with patient eating habits to decrease processed food intake and increase more fresh fruits and vegetable avoid high sugar snacks and high carbohydrate diet    Advised the patient to start taking Beano 2 chewable tablet 20 to 30 minutes before meals  Also advised her to start taking probiotics reevaluate in 2 to 3 weeks    1. Hyperlipidemia, unspecified hyperlipidemia type  She is adherent to statins  - LIPID PANEL; Future  - METABOLIC PANEL, COMPREHENSIVE; Future  - METABOLIC PANEL, COMPREHENSIVE  - LIPID PANEL    2. Vitamin D deficiency  She is on vitamin D supplements    3. Shortness of breath  Chest x-ray is negative to proceed and order an echocardiogram  - XR CHEST PA LAT; Future  - ECHO ADULT COMPLETE; Future    4. Prediabetes  Advised about lifestyle modifications  - HEMOGLOBIN A1C W/O EAG; Future  - HEMOGLOBIN A1C W/O EAG    5. Hypothyroidism (acquired)  She is adherent to thyroid medications  - TSH AND FREE T4; Future  - METABOLIC PANEL, COMPREHENSIVE; Future  - METABOLIC PANEL, COMPREHENSIVE  - TSH AND FREE T4  Current Outpatient Medications   Medication Sig Dispense Refill    OTHER Take  by mouth daily. Madhu for macular degeneration      levothyroxine (SYNTHROID) 75 mcg tablet take 1 tablet by mouth once daily BEFORE BREAKFAST 90 Tablet 0    conjugated estrogens (Premarin) 0.625 mg/gram vaginal cream Insert 0.5 g into vagina three (3) days a week. Do not use applicator, apply a pea-size amount with fingertip.  30 g 1    rosuvastatin (CRESTOR) 20 mg tablet Take 1 Tablet by mouth nightly for 90 days. 90 Tablet 3    glucosamine/chondr galloway A sod (OSTEO BI-FLEX PO) Take  by mouth daily. PNV RM.57/CWRVWTG fum/folic ac (PRENATAL PO) Take  by mouth daily. Nature made multivitamin      multivit-min/FA/lutein/zeaxant (MACULAR VITAMIN PO) Take  by mouth. CHOLECALCIFEROL, VITAMIN D3, (VITAMIN D3 PO) Take 1,000 Units by mouth daily. omega-3 fatty acids cap Take 3,000 mg by mouth daily. simvastatin (ZOCOR) 20 mg tablet Take  by mouth nightly.  (Patient not taking: Reported on 9/6/2022)         Patient Active Problem List    Diagnosis Date Noted    Carpal tunnel syndrome 09/19/2019    Neck pain 09/19/2019    Myofascial pain 11/15/2017    Vitamin D deficiency 01/29/2016    Vitamin B12 deficiency 01/29/2016    Insomnia 01/29/2016    Hyperlipidemia 06/04/2015    Hypothyroidism 06/04/2015    Cervical radicular pain 12/21/2011    Cervicalgia 12/21/2011     Results for orders placed or performed in visit on 07/07/22   AMB POC URINALYSIS DIP STICK AUTO W/O MICRO   Result Value Ref Range    Color (UA POC) Yellow     Clarity (UA POC) Clear     Glucose (UA POC) Negative Negative    Bilirubin (UA POC) Negative Negative    Ketones (UA POC) Negative Negative    Specific gravity (UA POC) 1.015 1.001 - 1.035    Blood (UA POC) Negative Negative    pH (UA POC) 6.5 4.6 - 8.0    Protein (UA POC) Negative Negative    Urobilinogen (UA POC) 0.2 mg/dL 0.2 - 1    Nitrites (UA POC) Negative Negative    Leukocyte esterase (UA POC) Negative Negative   AMB POC PVR, BESSY,POST-VOID RES,US,NON-IMAGING   Result Value Ref Range    PVR 44 cc     Office Visit on 07/07/2022   Component Date Value Ref Range Status    Color (UA POC) 07/07/2022 Yellow   Final    Clarity (UA POC) 07/07/2022 Clear   Final    Glucose (UA POC) 07/07/2022 Negative  Negative Final    Bilirubin (UA POC) 07/07/2022 Negative  Negative Final    Ketones (UA POC) 07/07/2022 Negative  Negative Final    Specific gravity (UA POC) 07/07/2022 1.015  1.001 - 1.035 Final    Blood (UA POC) 07/07/2022 Negative  Negative Final    pH (UA POC) 07/07/2022 6.5  4.6 - 8.0 Final    Protein (UA POC) 07/07/2022 Negative  Negative Final    Urobilinogen (UA POC) 07/07/2022 0.2 mg/dL  0.2 - 1 Final    Nitrites (UA POC) 07/07/2022 Negative  Negative Final    Leukocyte esterase (UA POC) 07/07/2022 Negative  Negative Final    PVR 07/07/2022 44  cc Final          Follow-up and Dispositions    Return if symptoms worsen or fail to improve.

## 2022-09-07 LAB
A-G RATIO,AGRAT: 2 RATIO (ref 1.1–2.6)
ALBUMIN SERPL-MCNC: 4.6 G/DL (ref 3.5–5)
ALP SERPL-CCNC: 83 U/L (ref 40–120)
ALT SERPL-CCNC: 30 U/L (ref 5–40)
ANION GAP SERPL CALC-SCNC: 13 MMOL/L (ref 3–15)
AST SERPL W P-5'-P-CCNC: 23 U/L (ref 10–37)
AVG GLU, 10930: 119 MG/DL (ref 91–123)
BILIRUB SERPL-MCNC: 0.3 MG/DL (ref 0.2–1.2)
BUN SERPL-MCNC: 13 MG/DL (ref 6–22)
CALCIUM SERPL-MCNC: 10.1 MG/DL (ref 8.4–10.5)
CHLORIDE SERPL-SCNC: 104 MMOL/L (ref 98–110)
CHOLEST SERPL-MCNC: 176 MG/DL (ref 110–200)
CO2 SERPL-SCNC: 23 MMOL/L (ref 20–32)
CREAT SERPL-MCNC: 0.6 MG/DL (ref 0.8–1.4)
GLOBULIN,GLOB: 2.3 G/DL (ref 2–4)
GLOMERULAR FILTRATION RATE: >60 ML/MIN/1.73 SQ.M.
GLUCOSE SERPL-MCNC: 79 MG/DL (ref 70–99)
HBA1C MFR BLD HPLC: 5.8 % (ref 4.8–5.6)
HDLC SERPL-MCNC: 3.2 MG/DL (ref 0–5)
HDLC SERPL-MCNC: 55 MG/DL
LDL/HDL RATIO,LDHD: 1.6
LDLC SERPL CALC-MCNC: 89 MG/DL (ref 50–99)
NON-HDL CHOLESTEROL, 011976: 121 MG/DL
POTASSIUM SERPL-SCNC: 4.4 MMOL/L (ref 3.5–5.5)
PROT SERPL-MCNC: 6.9 G/DL (ref 6.2–8.1)
SODIUM SERPL-SCNC: 140 MMOL/L (ref 133–145)
T4 FREE SERPL-MCNC: 1.2 NG/DL (ref 0.9–1.8)
TRIGL SERPL-MCNC: 160 MG/DL (ref 40–149)
TSH SERPL DL<=0.005 MIU/L-ACNC: 1.97 MCU/ML (ref 0.27–4.2)
VLDLC SERPL CALC-MCNC: 32 MG/DL (ref 8–30)

## 2022-09-10 NOTE — PROGRESS NOTES
Normal liver function kidney function  Cholesterol panel is within normal limit except slightly elevated triglycerides possible because patient was not fasting  Normal thyroid function  Hemoglobin A1c stable at 5.8 to continue low carbs low sugar low and increase physical activity

## 2022-10-23 DIAGNOSIS — E03.9 HYPOTHYROIDISM (ACQUIRED): ICD-10-CM

## 2022-10-25 RX ORDER — LEVOTHYROXINE SODIUM 75 UG/1
TABLET ORAL
Qty: 90 TABLET | Refills: 0 | Status: SHIPPED | OUTPATIENT
Start: 2022-10-25

## 2022-11-06 ENCOUNTER — TELEPHONE (OUTPATIENT)
Dept: FAMILY MEDICINE CLINIC | Age: 62
End: 2022-11-06

## 2022-11-06 NOTE — TELEPHONE ENCOUNTER
Normal echocardiogram if she still experiencing shortness of breath she will be referred to pulmonologist for pulmonary function test

## 2022-11-07 NOTE — TELEPHONE ENCOUNTER
LM for pt, echocardiogram test normal, if pt still experiencing SOB, will be referred to pulmonary for further testing.

## 2023-01-21 DIAGNOSIS — E03.9 HYPOTHYROIDISM (ACQUIRED): ICD-10-CM

## 2023-01-21 RX ORDER — LEVOTHYROXINE SODIUM 75 UG/1
TABLET ORAL
Qty: 90 TABLET | Refills: 0 | Status: SHIPPED | OUTPATIENT
Start: 2023-01-21

## 2023-02-07 ENCOUNTER — TELEPHONE (OUTPATIENT)
Dept: FAMILY MEDICINE CLINIC | Age: 63
End: 2023-02-07

## 2023-02-07 NOTE — TELEPHONE ENCOUNTER
Informed pt that mammogram results shows no sign of malignancy and recommendation for 1 year annual screening.

## 2023-04-18 RX ORDER — LEVOTHYROXINE SODIUM 0.07 MG/1
TABLET ORAL
Qty: 90 TABLET | Refills: 0 | Status: SHIPPED | OUTPATIENT
Start: 2023-04-18

## 2023-04-18 NOTE — TELEPHONE ENCOUNTER
Last visit: 9/6/22  Next visit:   Future Appointments   Date Time Provider Dante Urrutia   10/6/2023  3:20 PM William 66, APRN - NP Ying Muhammad     Last filled: 10/25/22; levothyroxine 75 mcg tab

## 2023-05-16 RX ORDER — ROSUVASTATIN CALCIUM 20 MG/1
TABLET, COATED ORAL
Qty: 90 TABLET | Refills: 1 | Status: SHIPPED | OUTPATIENT
Start: 2023-05-16

## 2023-06-21 ENCOUNTER — OFFICE VISIT (OUTPATIENT)
Dept: FAMILY MEDICINE CLINIC | Facility: CLINIC | Age: 63
End: 2023-06-21
Payer: COMMERCIAL

## 2023-06-21 VITALS
HEART RATE: 73 BPM | WEIGHT: 195.8 LBS | TEMPERATURE: 98 F | RESPIRATION RATE: 14 BRPM | SYSTOLIC BLOOD PRESSURE: 122 MMHG | OXYGEN SATURATION: 96 % | HEIGHT: 63 IN | DIASTOLIC BLOOD PRESSURE: 80 MMHG | BODY MASS INDEX: 34.69 KG/M2

## 2023-06-21 DIAGNOSIS — E78.5 HYPERLIPIDEMIA, UNSPECIFIED HYPERLIPIDEMIA TYPE: ICD-10-CM

## 2023-06-21 DIAGNOSIS — E78.2 MIXED HYPERLIPIDEMIA: ICD-10-CM

## 2023-06-21 DIAGNOSIS — E66.09 CLASS 1 OBESITY DUE TO EXCESS CALORIES WITH BODY MASS INDEX (BMI) OF 34.0 TO 34.9 IN ADULT, UNSPECIFIED WHETHER SERIOUS COMORBIDITY PRESENT: ICD-10-CM

## 2023-06-21 DIAGNOSIS — R73.03 PREDIABETES: ICD-10-CM

## 2023-06-21 DIAGNOSIS — Z00.00 ANNUAL PHYSICAL EXAM: Primary | ICD-10-CM

## 2023-06-21 DIAGNOSIS — E03.9 ACQUIRED HYPOTHYROIDISM: ICD-10-CM

## 2023-06-21 PROCEDURE — 99396 PREV VISIT EST AGE 40-64: CPT | Performed by: LEGAL MEDICINE

## 2023-06-21 RX ORDER — LEVOTHYROXINE SODIUM 0.07 MG/1
TABLET ORAL
Qty: 90 TABLET | Refills: 1 | Status: SHIPPED | OUTPATIENT
Start: 2023-06-21

## 2023-06-21 RX ORDER — ROSUVASTATIN CALCIUM 20 MG/1
20 TABLET, COATED ORAL NIGHTLY
Qty: 90 TABLET | Refills: 3 | Status: SHIPPED | OUTPATIENT
Start: 2023-06-21

## 2023-06-21 SDOH — ECONOMIC STABILITY: HOUSING INSECURITY
IN THE LAST 12 MONTHS, WAS THERE A TIME WHEN YOU DID NOT HAVE A STEADY PLACE TO SLEEP OR SLEPT IN A SHELTER (INCLUDING NOW)?: NO

## 2023-06-21 SDOH — ECONOMIC STABILITY: INCOME INSECURITY: HOW HARD IS IT FOR YOU TO PAY FOR THE VERY BASICS LIKE FOOD, HOUSING, MEDICAL CARE, AND HEATING?: NOT HARD AT ALL

## 2023-06-21 SDOH — ECONOMIC STABILITY: FOOD INSECURITY: WITHIN THE PAST 12 MONTHS, THE FOOD YOU BOUGHT JUST DIDN'T LAST AND YOU DIDN'T HAVE MONEY TO GET MORE.: NEVER TRUE

## 2023-06-21 SDOH — ECONOMIC STABILITY: FOOD INSECURITY: WITHIN THE PAST 12 MONTHS, YOU WORRIED THAT YOUR FOOD WOULD RUN OUT BEFORE YOU GOT MONEY TO BUY MORE.: NEVER TRUE

## 2023-06-21 ASSESSMENT — ENCOUNTER SYMPTOMS
EYE PAIN: 0
VOMITING: 0
ABDOMINAL PAIN: 0
CHEST TIGHTNESS: 0
SINUS PAIN: 0
FACIAL SWELLING: 0
COUGH: 0
CONSTIPATION: 0
EYE REDNESS: 0
DIARRHEA: 0
WHEEZING: 0
SINUS PRESSURE: 0
NAUSEA: 0
EYE ITCHING: 0
BLOOD IN STOOL: 0
SHORTNESS OF BREATH: 0
EYE DISCHARGE: 0
SORE THROAT: 0
CHOKING: 0
APNEA: 0
RHINORRHEA: 0
ANAL BLEEDING: 0
BACK PAIN: 1

## 2023-06-21 ASSESSMENT — PATIENT HEALTH QUESTIONNAIRE - PHQ9
SUM OF ALL RESPONSES TO PHQ QUESTIONS 1-9: 0
SUM OF ALL RESPONSES TO PHQ9 QUESTIONS 1 & 2: 0
SUM OF ALL RESPONSES TO PHQ QUESTIONS 1-9: 0
1. LITTLE INTEREST OR PLEASURE IN DOING THINGS: 0
SUM OF ALL RESPONSES TO PHQ QUESTIONS 1-9: 0
SUM OF ALL RESPONSES TO PHQ QUESTIONS 1-9: 0
2. FEELING DOWN, DEPRESSED OR HOPELESS: 0

## 2023-06-21 ASSESSMENT — ANXIETY QUESTIONNAIRES
7. FEELING AFRAID AS IF SOMETHING AWFUL MIGHT HAPPEN: 0
6. BECOMING EASILY ANNOYED OR IRRITABLE: 0
1. FEELING NERVOUS, ANXIOUS, OR ON EDGE: 0
5. BEING SO RESTLESS THAT IT IS HARD TO SIT STILL: 0
IF YOU CHECKED OFF ANY PROBLEMS ON THIS QUESTIONNAIRE, HOW DIFFICULT HAVE THESE PROBLEMS MADE IT FOR YOU TO DO YOUR WORK, TAKE CARE OF THINGS AT HOME, OR GET ALONG WITH OTHER PEOPLE: NOT DIFFICULT AT ALL
4. TROUBLE RELAXING: 0
2. NOT BEING ABLE TO STOP OR CONTROL WORRYING: 0
3. WORRYING TOO MUCH ABOUT DIFFERENT THINGS: 0
GAD7 TOTAL SCORE: 0

## 2023-06-21 NOTE — PROGRESS NOTES
Seth Victor is a 58 y.o. female (: 1960) presenting to address:    Chief Complaint   Patient presents with    Annual Exam       Vitals:    23 1103   BP: 122/80   Pulse: 73   Resp: 14   Temp: 98 °F (36.7 °C)   SpO2: 96%       Coordination of Care Questionaire:   1. \"Have you been to the ER, urgent care clinic since your last visit? Hospitalized since your last visit? \" No    2. \"Have you seen or consulted any other health care providers outside of the [address] [place] [address] Seth since your last visit? \"  Podiatry, Dr. Charity Devries      3. For patients aged 39-70: Has the patient had a colonoscopy / FIT/ Cologuard? Yes - no Care Gap present      If the patient is female:    4. For patients aged 41-77: Has the patient had a mammogram within the past 2 years? Yes - no Care Gap present      5. For patients aged 21-65: Has the patient had a pap smear? Yes - no Care Gap present    Advanced Directive:   1. Do you have an Advanced Directive? No    2. Would you like information on Advanced Directives?  No
Respiratory:  Negative for apnea, cough, choking, chest tightness, shortness of breath and wheezing. Cardiovascular:  Negative for chest pain, palpitations and leg swelling. Gastrointestinal:  Negative for abdominal pain, anal bleeding, blood in stool, constipation, diarrhea, nausea and vomiting. Endocrine: Negative for cold intolerance and heat intolerance. Genitourinary:  Negative for difficulty urinating, dysuria and flank pain. Musculoskeletal:  Positive for arthralgias, back pain and joint swelling. Negative for gait problem, myalgias, neck pain and neck stiffness. Skin:  Negative for rash. Neurological:  Negative for dizziness, light-headedness and headaches. Psychiatric/Behavioral:  Negative for agitation, behavioral problems, confusion, decreased concentration, dysphoric mood, hallucinations, sleep disturbance and suicidal ideas. The patient is not nervous/anxious and is not hyperactive. Physical Exam  Vitals and nursing note reviewed. Constitutional:       General: She is not in acute distress. Appearance: Normal appearance. She is normal weight. She is not ill-appearing, toxic-appearing or diaphoretic. HENT:      Right Ear: Tympanic membrane, ear canal and external ear normal. There is no impacted cerumen. Left Ear: Tympanic membrane, ear canal and external ear normal. There is no impacted cerumen. Eyes:      General: No scleral icterus. Right eye: No discharge. Left eye: No discharge. Conjunctiva/sclera: Conjunctivae normal.      Pupils: Pupils are equal, round, and reactive to light. Cardiovascular:      Rate and Rhythm: Normal rate and regular rhythm. Pulmonary:      Effort: No respiratory distress. Breath sounds: Normal breath sounds. No wheezing or rhonchi. Abdominal:      General: There is no distension. Tenderness: There is no abdominal tenderness. There is no guarding. Musculoskeletal:         General: No tenderness.

## 2023-06-22 LAB
T4 FREE: 1.1 NG/DL (ref 0.9–1.8)
TSH SERPL DL<=0.05 MIU/L-ACNC: 3.59 MCU/ML (ref 0.27–4.2)

## 2023-08-31 ENCOUNTER — OFFICE VISIT (OUTPATIENT)
Age: 63
End: 2023-08-31
Payer: COMMERCIAL

## 2023-08-31 VITALS
HEIGHT: 63 IN | HEART RATE: 72 BPM | OXYGEN SATURATION: 99 % | SYSTOLIC BLOOD PRESSURE: 117 MMHG | BODY MASS INDEX: 33.84 KG/M2 | DIASTOLIC BLOOD PRESSURE: 61 MMHG | TEMPERATURE: 98.2 F | WEIGHT: 191 LBS | RESPIRATION RATE: 18 BRPM

## 2023-08-31 DIAGNOSIS — E66.09 CLASS 1 OBESITY DUE TO EXCESS CALORIES WITH SERIOUS COMORBIDITY AND BODY MASS INDEX (BMI) OF 33.0 TO 33.9 IN ADULT: Primary | ICD-10-CM

## 2023-08-31 DIAGNOSIS — Z71.3 ENCOUNTER FOR DIETARY CONSULTATION: ICD-10-CM

## 2023-08-31 DIAGNOSIS — Z71.3 WEIGHT LOSS COUNSELING, ENCOUNTER FOR: ICD-10-CM

## 2023-08-31 PROCEDURE — 99203 OFFICE O/P NEW LOW 30 MIN: CPT | Performed by: NURSE PRACTITIONER

## 2023-08-31 ASSESSMENT — ENCOUNTER SYMPTOMS
RESPIRATORY NEGATIVE: 1
ALLERGIC/IMMUNOLOGIC NEGATIVE: 1
EYES NEGATIVE: 1
GASTROINTESTINAL NEGATIVE: 1

## 2023-08-31 NOTE — PROGRESS NOTES
Weight Loss Progress Note: Initial Physician Visit      Tamara Bragg is a 58 y.o. female with BMI 33.6 who is here for her initial evaluation for the medical weight loss medication program. Patient has not previously done a [facility] program, but wants to lose weight to be healthier. CC: Obesity    Weight History  Current weight 191lb   Goal weight 140lbs  Highest weight 200lbs   (See weight gain time line scanned into media section of chart)    Food intolerances (gas, bloating, diarrhea, vomiting? None. Weight loss History  How many weight loss attempts have you had? A few attempts. Which program were you most successful doing? Phentermine x years until it was no longer affective. Significant Medical History    Have you ever taken appetite suppressants? Yes. If yes: Rx or OTC? Phentermine. If yes; Any negative side effects? Patient reports it was not effective for her after a period of time. Ever diagnosed with sleep apnea or put on CPAP? No.    Ever had bariatric surgery?: No.    Pregnant or planning on becoming pregnant w/in 6 months: No.      Significant Psychosocial History   Any history of drug abuse or dependence: No.  Current Major Lifestyle Changes: Hypothyroidism, High Cholesterol  Any potential unsupportive people: No.  Why are you starting a weight loss program now? Patient desires to lose weight so that she can maintain her health conditions. Are you ready? Yes. History of binge eating disorder or anorexia: No.  If yes, are you currently being treated? Social History  Social History     Tobacco Use    Smoking status: Former     Types: Cigarettes     Quit date: 1995     Years since quittin.8    Smokeless tobacco: Never   Substance Use Topics    Alcohol use: Yes     How many times a week do you eat out? Once weekly. Do you drink any EtOH? Yes. If so, how much?         Exercise  How many days a week do you currently exercise?  0 days  Have you ever been

## 2023-09-07 ENCOUNTER — TELEPHONE (OUTPATIENT)
Age: 63
End: 2023-09-07

## 2023-09-07 NOTE — TELEPHONE ENCOUNTER
The patient called stating that her insurance is not coving contrave and would like coupons mailed to her.

## 2023-09-11 NOTE — TELEPHONE ENCOUNTER
Contrave pamphlet (which inclues $99 cost) has been sent out in the mail today and patient has been notified.

## 2023-10-05 ENCOUNTER — TELEPHONE (OUTPATIENT)
Age: 63
End: 2023-10-05

## 2023-10-10 DIAGNOSIS — E66.09 CLASS 1 OBESITY DUE TO EXCESS CALORIES WITH SERIOUS COMORBIDITY AND BODY MASS INDEX (BMI) OF 33.0 TO 33.9 IN ADULT: ICD-10-CM

## 2023-10-10 NOTE — TELEPHONE ENCOUNTER
Patient LVM on nurse line stating she has not received medication contrave yet. Contacted patient. Patient states was told on last appt medication would be sent to [facility] on line  I advised patient Per fe's message still awating response form insurance company first before she can send medication to pharmacy. Patient requesting a call when medication is sent to pharmacy due to she has a package room that she need to go to to  packages and she has been going there everyday to look for medication. I advised patient message will be forwarded. Patient verbalized understanding.

## 2023-10-12 NOTE — TELEPHONE ENCOUNTER
Contacted patient to notify medication contrave has been sent to pharmacy [facility] per candy and per patient request.  Patient verbalized understanding. TAHMINA Colbert - AMIE Reese MA  Caller: Unspecified (1 week ago, 11:33 AM)  Please let patient know that prescription has been sent to [facility] per her request. Thanks Nicolasa Barnes!

## 2024-01-09 DIAGNOSIS — E03.9 ACQUIRED HYPOTHYROIDISM: ICD-10-CM

## 2024-01-09 NOTE — TELEPHONE ENCOUNTER
[facility] refill fax request. Message sent to the clinical staff to further assist.    Requested Prescriptions     Pending Prescriptions Disp Refills    levothyroxine (SYNTHROID) 75 MCG tablet 90 tablet 1     Sig: TAKE 1 TABLET BY MOUTH ONCE DAILY BEFORE BREAKFAST

## 2024-01-11 RX ORDER — LEVOTHYROXINE SODIUM 0.07 MG/1
TABLET ORAL
Qty: 90 TABLET | Refills: 1 | Status: SHIPPED | OUTPATIENT
Start: 2024-01-11